# Patient Record
Sex: FEMALE | Race: WHITE | NOT HISPANIC OR LATINO | Employment: OTHER | URBAN - METROPOLITAN AREA
[De-identification: names, ages, dates, MRNs, and addresses within clinical notes are randomized per-mention and may not be internally consistent; named-entity substitution may affect disease eponyms.]

---

## 2017-01-03 RX ORDER — ALLOPURINOL 100 MG/1
200 TABLET ORAL DAILY
Qty: 60 TABLET | Refills: 6 | Status: SHIPPED | OUTPATIENT
Start: 2017-01-03 | End: 2017-02-17 | Stop reason: SDUPTHER

## 2017-01-11 ENCOUNTER — OFFICE VISIT (OUTPATIENT)
Dept: UROLOGY | Facility: CLINIC | Age: 82
End: 2017-01-11
Payer: MEDICARE

## 2017-01-11 VITALS — BODY MASS INDEX: 14.59 KG/M2 | WEIGHT: 85 LBS

## 2017-01-11 DIAGNOSIS — R33.9 URINARY RETENTION: Primary | ICD-10-CM

## 2017-01-11 LAB
BILIRUB SERPL-MCNC: NORMAL MG/DL
BLOOD URINE, POC: NORMAL
COLOR, POC UA: NORMAL
GLUCOSE UR QL STRIP: NORMAL
KETONES UR QL STRIP: NORMAL
LEUKOCYTE ESTERASE URINE, POC: NORMAL
NITRITE, POC UA: NORMAL
PH, POC UA: 7
POC RESIDUAL URINE VOLUME: 41 ML (ref 0–100)
PROTEIN, POC: NORMAL
SPECIFIC GRAVITY, POC UA: 1.01
UROBILINOGEN, POC UA: NORMAL

## 2017-01-11 PROCEDURE — 1159F MED LIST DOCD IN RCRD: CPT | Mod: S$GLB,,, | Performed by: UROLOGY

## 2017-01-11 PROCEDURE — 81002 URINALYSIS NONAUTO W/O SCOPE: CPT | Mod: S$GLB,,, | Performed by: UROLOGY

## 2017-01-11 PROCEDURE — 1126F AMNT PAIN NOTED NONE PRSNT: CPT | Mod: S$GLB,,, | Performed by: UROLOGY

## 2017-01-11 PROCEDURE — 1157F ADVNC CARE PLAN IN RCRD: CPT | Mod: S$GLB,,, | Performed by: UROLOGY

## 2017-01-11 PROCEDURE — 51798 US URINE CAPACITY MEASURE: CPT | Mod: S$GLB,,, | Performed by: UROLOGY

## 2017-01-11 PROCEDURE — 99999 PR PBB SHADOW E&M-EST. PATIENT-LVL I: CPT | Mod: PBBFAC,,, | Performed by: UROLOGY

## 2017-01-11 PROCEDURE — 99499 UNLISTED E&M SERVICE: CPT | Mod: S$GLB,,, | Performed by: UROLOGY

## 2017-01-11 PROCEDURE — 99214 OFFICE O/P EST MOD 30 MIN: CPT | Mod: 25,S$GLB,, | Performed by: UROLOGY

## 2017-01-11 PROCEDURE — 1160F RVW MEDS BY RX/DR IN RCRD: CPT | Mod: S$GLB,,, | Performed by: UROLOGY

## 2017-01-11 NOTE — MR AVS SNAPSHOT
[facility]  [address] [facility] [address]  Phone: [phone]  Fax: [phone]                  Anju Rivera   2017 11:00 AM   Office Visit    Description:  Female : 1929   Provider:  Arley El IV, MD   Department:  [facility]           Diagnoses this Visit        Comments    Urinary retention    -  Primary            To Do List           Future Appointments        Provider Department Dept Phone    3/16/2017 1:30 PM Damian Verduzco MD [facility] - Ophthalmology [phone]    2017 11:20 AM Elizabeth Lejeune, NP [facility] - Pulmonary Services [phone]      Goals (5 Years of Data)     None      Follow-Up and Disposition     Return if symptoms worsen or fail to improve.    Follow-up and Disposition History      [facility]     [facility] -  Assistance  Registered nurses in the [facility] provide clinical advisement, health education, appointment booking, and other advisory services.  Call for this free service at [phone].             Medications           Message regarding Medications     Verify the changes and/or additions to your medication regime listed below are the same as discussed with your clinician today.  If any of these changes or additions are incorrect, please notify your healthcare provider.             Verify that the below list of medications is an accurate representation of the medications you are currently taking.  If none reported, the list may be blank. If incorrect, please contact your healthcare provider. Carry this list with you in case of emergency.           Current Medications     albuterol 90 mcg/actuation inhaler Inhale 2 puffs into the lungs every 4 (four) hours as needed for Wheezing.    albuterol-ipratropium 2.5mg-0.5mg/3mL (DUO-NEB) 0.5 mg-3 mg(2.5 mg base)/3 mL nebulizer solution Take 3 mLs by nebulization every 4 (four) hours as needed for Wheezing. Every 6-8  hours    allopurinol (ZYLOPRIM) 100 MG tablet Take 2 tablets (200 mg total) by mouth once daily.    budesonide-formoterol 80-4.5 mcg (SYMBICORT) 80-4.5 mcg/actuation HFAA INHALE 2 PUFFS BY MOUTH 2 TIMES DAILY (RINSE MOUTH AFTER USE)    citalopram (CELEXA) 40 MG tablet TAKE ONE TABLET BY MOUTH ONCE DAILY    clonazePAM (KLONOPIN) 1 MG tablet TAKE 1 AND 1/2 TABLET BY MOUTH EVERY DAY    cloNIDine (CATAPRES) 0.1 MG tablet TAKE ONE TABLET BY MOUTH ONCE DAILY    hydrochlorothiazide (MICROZIDE) 12.5 mg capsule TAKE TWO CAPSULES (25 MG TOTAL) BY MOUTH ONCE DAILY.    hydrocodone-acetaminophen 5-325mg (NORCO) 5-325 mg per tablet Take 1 1/2 tablet at night    latanoprost 0.005 % ophthalmic solution INSTILL 1 DROP IN EACH EYE EVERY EVENING    levothyroxine (SYNTHROID) 50 MCG tablet Take 1 tablet (50 mcg total) by mouth once daily.    losartan (COZAAR) 100 MG tablet Take 1 tablet (100 mg total) by mouth once daily.    MULTIVITAMIN Take 1 tablet by mouth Daily.           Clinical Reference Information           Vital Signs - Last Recorded  Most recent update: 1/11/2017 11:29 AM by Summer Coker LPN    Wt BMI             38.6 kg (85 lb) 14.59 kg/m2         Allergies as of 1/11/2017     Atorvastatin    Calcium Channel Blocking Agent Diltiazem Analogues      Immunizations Administered on Date of Encounter - 1/11/2017     None      Orders Placed During Today's Visit      Normal Orders This Visit    POCT Bladder Scan     POCT urine dipstick without microscope

## 2017-01-11 NOTE — PROGRESS NOTES
Chief Complaint: Urinary retention    HPI:   1/11/17: Voiding fine feeling well.  No complaints at all.  PVR 41 ml.  10/11/16: Doing well now passed voiding trial PVR 0 today after having miranda out yesterday.  10/7/16: 86 yo woman with intermittent urinary retention was recently hospitalized for a broken left femur and in that process a miranda was placed.  She has had periods of retention in the past with miranda catheters from time to time.  Can walk with assistance but not as good as before the break.  No problems from the catheter but doesn't like it.  It has been in a week.  Getting around a lot better after this week in rehab.  A lot of urine was produced when the miranda was placed.  In the past voided okay when not taking narcotics.    Allergies:  Atorvastatin and Calcium channel blocking agent diltiazem analogues    Medications: has a current medication list which includes the following prescription(s): albuterol, albuterol-ipratropium 2.5mg-0.5mg/3ml, allopurinol, budesonide-formoterol 80-4.5 mcg, citalopram, clonazepam, clonidine, hydrochlorothiazide, hydrocodone-acetaminophen 5-325mg, latanoprost, levothyroxine, losartan, and multivitamin.    Review of Systems:  General: No fever, chills, fatigability, or weight loss.  Skin: No rashes, itching, or changes in color or texture of skin.  Chest: Denies CHRISTINE, cyanosis, wheezing, cough, and sputum production.  Abdomen: Appetite fine. No weight loss. Denies diarrhea, abdominal pain, hematemesis, or blood in stool.  Musculoskeletal: No joint stiffness or swelling. Denies back pain.  : As above.  All other review of systems negative.    PMH:   has a past medical history of Anxiety; Aortic valve disorder (6/17/2013); Arthritis; Breast cancer (1981); Cataract; Chronic bronchitis; Chronic diastolic heart failure (6/17/2013); COPD (chronic obstructive pulmonary disease) (6/17/2013); Coronary artery disease (6/17/2013); Decubitus skin ulcer (11/11/2014); Depression;  Disorder of kidney and ureter; Emphysema of lung; Fall; Glaucoma; Hyperlipidemia; Hypertension (6/17/2013); Hyponatremia (6/3/2014); Hypothyroidism; Kidney cysts; MI (myocardial infarction) (05/1981); Mitral regurgitation (6/17/2013); Nail abnormality (7/16/2014); Osteoporosis; Peripheral vascular disease; Pneumonia; Pulmonary hypertension (6/17/2013); Sinus bradycardia (6/18/2013); Skin cancer; and Thyroid disease.    PSH:   has a past surgical history that includes masectomy (Right, 1996); Finger amputation (1980s); Toe amputation (Left, 1980s); Wrist Arthroplasty (Left, 2000s); ORIF hip fracture (Left, 11/2012); Eye surgery; Cataract extraction; Cardiac catheterization; and Femur fracture surgery.    FamHx: family history includes Arthritis in her father; Cancer in her sister and sister; Glaucoma in her daughter; Heart attack in her mother; Hypertension in her mother; Stroke in her mother. There is no history of Strabismus, Retinal detachment, Macular degeneration, Blindness, or Amblyopia.    SocHx:  reports that she quit smoking about 15 years ago. She has a 20.00 pack-year smoking history. She has never used smokeless tobacco. She reports that she does not drink alcohol or use illicit drugs.     Physical Exam:  Vitals:   There were no vitals filed for this visit.  General: A&Ox3. No apparent distress. No deformities.  Neck: No masses. Normal thyroid.  Lungs: normal inspiration. No use of accessory muscles.  Heart: normal pulse. No arrhythmias.  Abdomen: Soft. NT. ND  Skin: The skin is warm and dry. No jaundice.  Ext: No c/c/e.  :   10/7/16: External genitalia normal.     Labs/Studies:   Bladder Scan performed in office: PVR 41 ml.    Impression/Plan:   1. Doing fine.  Seems to have retention when taking pain meds - avoid as much as possible.  2. RTC prn

## 2017-01-20 ENCOUNTER — PATIENT MESSAGE (OUTPATIENT)
Dept: INTERNAL MEDICINE | Facility: CLINIC | Age: 82
End: 2017-01-20

## 2017-01-21 RX ORDER — CLONAZEPAM 1 MG/1
1.5 TABLET ORAL DAILY
Qty: 45 TABLET | Refills: 1 | Status: SHIPPED | OUTPATIENT
Start: 2017-01-21 | End: 2017-03-14 | Stop reason: SDUPTHER

## 2017-01-23 ENCOUNTER — PATIENT MESSAGE (OUTPATIENT)
Dept: INTERNAL MEDICINE | Facility: CLINIC | Age: 82
End: 2017-01-23

## 2017-01-23 DIAGNOSIS — R52 PAIN: ICD-10-CM

## 2017-01-23 RX ORDER — HYDROCODONE BITARTRATE AND ACETAMINOPHEN 5; 325 MG/1; MG/1
TABLET ORAL
Qty: 45 TABLET | Refills: 0 | Status: SHIPPED | OUTPATIENT
Start: 2017-01-23 | End: 2017-02-20 | Stop reason: SDUPTHER

## 2017-01-24 ENCOUNTER — TELEPHONE (OUTPATIENT)
Dept: INTERNAL MEDICINE | Facility: CLINIC | Age: 82
End: 2017-01-24

## 2017-01-24 ENCOUNTER — PATIENT MESSAGE (OUTPATIENT)
Dept: INTERNAL MEDICINE | Facility: CLINIC | Age: 82
End: 2017-01-24

## 2017-01-31 ENCOUNTER — TELEPHONE (OUTPATIENT)
Dept: INTERNAL MEDICINE | Facility: CLINIC | Age: 82
End: 2017-01-31

## 2017-01-31 NOTE — TELEPHONE ENCOUNTER
Spoke to Kori at [facility].  She states that patient's index finger is infected and oozing pus.  Informed her that at this point patient must be evaluated since it has progressed to this level.  Please advise

## 2017-01-31 NOTE — TELEPHONE ENCOUNTER
----- Message from Svetlana Harris sent at 1/31/2017 12:04 PM CST -----  Contact: Kori Javier calling in regards to pt had infection right index finger cuticle.....[phone]

## 2017-02-15 ENCOUNTER — TELEPHONE (OUTPATIENT)
Dept: CARDIOLOGY | Facility: CLINIC | Age: 82
End: 2017-02-15

## 2017-02-17 RX ORDER — ALLOPURINOL 100 MG/1
200 TABLET ORAL DAILY
Qty: 60 TABLET | Refills: 6 | Status: SHIPPED | OUTPATIENT
Start: 2017-02-17 | End: 2017-09-28 | Stop reason: SDUPTHER

## 2017-02-20 ENCOUNTER — PATIENT MESSAGE (OUTPATIENT)
Dept: INTERNAL MEDICINE | Facility: CLINIC | Age: 82
End: 2017-02-20

## 2017-02-20 DIAGNOSIS — R52 PAIN: ICD-10-CM

## 2017-02-20 RX ORDER — HYDROCODONE BITARTRATE AND ACETAMINOPHEN 5; 325 MG/1; MG/1
TABLET ORAL
Qty: 45 TABLET | Refills: 0 | Status: SHIPPED | OUTPATIENT
Start: 2017-02-20 | End: 2017-03-14 | Stop reason: SDUPTHER

## 2017-02-27 RX ORDER — CLONIDINE HYDROCHLORIDE 0.1 MG/1
0.1 TABLET ORAL DAILY
Qty: 30 TABLET | Refills: 3 | Status: SHIPPED | OUTPATIENT
Start: 2017-02-27 | End: 2017-06-29 | Stop reason: SDUPTHER

## 2017-03-01 ENCOUNTER — PATIENT OUTREACH (OUTPATIENT)
Dept: ADMINISTRATIVE | Facility: HOSPITAL | Age: 82
End: 2017-03-01
Payer: MEDICARE

## 2017-03-01 DIAGNOSIS — M89.9 BONE DISORDER: Primary | ICD-10-CM

## 2017-03-14 ENCOUNTER — LAB VISIT (OUTPATIENT)
Dept: LAB | Facility: HOSPITAL | Age: 82
End: 2017-03-14
Attending: FAMILY MEDICINE
Payer: MEDICARE

## 2017-03-14 ENCOUNTER — OFFICE VISIT (OUTPATIENT)
Dept: INTERNAL MEDICINE | Facility: CLINIC | Age: 82
End: 2017-03-14
Payer: MEDICARE

## 2017-03-14 VITALS
TEMPERATURE: 97 F | BODY MASS INDEX: 14.9 KG/M2 | OXYGEN SATURATION: 93 % | WEIGHT: 87.31 LBS | DIASTOLIC BLOOD PRESSURE: 60 MMHG | SYSTOLIC BLOOD PRESSURE: 110 MMHG | HEIGHT: 64 IN | HEART RATE: 64 BPM

## 2017-03-14 DIAGNOSIS — I77.9 BILATERAL CAROTID ARTERY DISEASE: ICD-10-CM

## 2017-03-14 DIAGNOSIS — S98.112A AMPUTATED GREAT TOE, LEFT: ICD-10-CM

## 2017-03-14 DIAGNOSIS — I50.32 CHRONIC DIASTOLIC HEART FAILURE: Chronic | ICD-10-CM

## 2017-03-14 DIAGNOSIS — E03.4 HYPOTHYROIDISM DUE TO ACQUIRED ATROPHY OF THYROID: ICD-10-CM

## 2017-03-14 DIAGNOSIS — R52 PAIN: ICD-10-CM

## 2017-03-14 DIAGNOSIS — I10 ESSENTIAL HYPERTENSION: Chronic | ICD-10-CM

## 2017-03-14 DIAGNOSIS — J44.9 CHRONIC OBSTRUCTIVE PULMONARY DISEASE, UNSPECIFIED COPD TYPE: Chronic | ICD-10-CM

## 2017-03-14 DIAGNOSIS — Z28.9 DELAYED IMMUNIZATIONS: ICD-10-CM

## 2017-03-14 DIAGNOSIS — F32.5 MAJOR DEPRESSIVE DISORDER WITH SINGLE EPISODE, IN REMISSION: ICD-10-CM

## 2017-03-14 DIAGNOSIS — I10 ESSENTIAL HYPERTENSION: Primary | Chronic | ICD-10-CM

## 2017-03-14 LAB
ALBUMIN SERPL BCP-MCNC: 3.7 G/DL
ALP SERPL-CCNC: 54 U/L
ALT SERPL W/O P-5'-P-CCNC: 12 U/L
ANION GAP SERPL CALC-SCNC: 8 MMOL/L
AST SERPL-CCNC: 22 U/L
BILIRUB SERPL-MCNC: 0.4 MG/DL
BUN SERPL-MCNC: 46 MG/DL
CALCIUM SERPL-MCNC: 9.5 MG/DL
CHLORIDE SERPL-SCNC: 107 MMOL/L
CO2 SERPL-SCNC: 24 MMOL/L
CREAT SERPL-MCNC: 1.6 MG/DL
EST. GFR  (AFRICAN AMERICAN): 33.2 ML/MIN/1.73 M^2
EST. GFR  (NON AFRICAN AMERICAN): 28.8 ML/MIN/1.73 M^2
GLUCOSE SERPL-MCNC: 110 MG/DL
POTASSIUM SERPL-SCNC: 5 MMOL/L
PROT SERPL-MCNC: 6.2 G/DL
SODIUM SERPL-SCNC: 139 MMOL/L
T4 FREE SERPL-MCNC: 1.1 NG/DL
TSH SERPL DL<=0.005 MIU/L-ACNC: 1.26 UIU/ML

## 2017-03-14 PROCEDURE — 1157F ADVNC CARE PLAN IN RCRD: CPT | Mod: S$GLB,,, | Performed by: FAMILY MEDICINE

## 2017-03-14 PROCEDURE — 99499 UNLISTED E&M SERVICE: CPT | Mod: S$GLB,,, | Performed by: FAMILY MEDICINE

## 2017-03-14 PROCEDURE — 99999 PR PBB SHADOW E&M-EST. PATIENT-LVL III: CPT | Mod: PBBFAC,,, | Performed by: FAMILY MEDICINE

## 2017-03-14 PROCEDURE — 1126F AMNT PAIN NOTED NONE PRSNT: CPT | Mod: S$GLB,,, | Performed by: FAMILY MEDICINE

## 2017-03-14 PROCEDURE — 36415 COLL VENOUS BLD VENIPUNCTURE: CPT | Mod: PO

## 2017-03-14 PROCEDURE — 84443 ASSAY THYROID STIM HORMONE: CPT

## 2017-03-14 PROCEDURE — 1159F MED LIST DOCD IN RCRD: CPT | Mod: S$GLB,,, | Performed by: FAMILY MEDICINE

## 2017-03-14 PROCEDURE — 90670 PCV13 VACCINE IM: CPT | Mod: S$GLB,,, | Performed by: FAMILY MEDICINE

## 2017-03-14 PROCEDURE — 1160F RVW MEDS BY RX/DR IN RCRD: CPT | Mod: S$GLB,,, | Performed by: FAMILY MEDICINE

## 2017-03-14 PROCEDURE — 99214 OFFICE O/P EST MOD 30 MIN: CPT | Mod: S$GLB,,, | Performed by: FAMILY MEDICINE

## 2017-03-14 PROCEDURE — 80053 COMPREHEN METABOLIC PANEL: CPT

## 2017-03-14 PROCEDURE — 84439 ASSAY OF FREE THYROXINE: CPT

## 2017-03-14 PROCEDURE — G0009 ADMIN PNEUMOCOCCAL VACCINE: HCPCS | Mod: S$GLB,,, | Performed by: FAMILY MEDICINE

## 2017-03-14 RX ORDER — LOSARTAN POTASSIUM 100 MG/1
TABLET ORAL
Qty: 90 TABLET | Refills: 3 | Status: SHIPPED | OUTPATIENT
Start: 2017-03-14 | End: 2017-04-07

## 2017-03-14 RX ORDER — ASPIRIN 81 MG/1
81 TABLET ORAL DAILY
COMMUNITY

## 2017-03-14 RX ORDER — HYDROCHLOROTHIAZIDE 12.5 MG/1
CAPSULE ORAL
Qty: 60 CAPSULE | Refills: 11 | Status: SHIPPED | OUTPATIENT
Start: 2017-03-14 | End: 2017-04-07

## 2017-03-14 RX ORDER — HYDROCODONE BITARTRATE AND ACETAMINOPHEN 5; 325 MG/1; MG/1
TABLET ORAL
Qty: 75 TABLET | Refills: 0 | Status: SHIPPED | OUTPATIENT
Start: 2017-03-14 | End: 2017-05-22 | Stop reason: SDUPTHER

## 2017-03-14 RX ORDER — HYDROCODONE BITARTRATE AND ACETAMINOPHEN 5; 325 MG/1; MG/1
TABLET ORAL
Qty: 45 TABLET | Refills: 0 | Status: SHIPPED | OUTPATIENT
Start: 2017-03-14 | End: 2017-03-14 | Stop reason: SDUPTHER

## 2017-03-14 RX ORDER — CLONAZEPAM 1 MG/1
1.5 TABLET ORAL DAILY
Qty: 45 TABLET | Refills: 1 | Status: SHIPPED | OUTPATIENT
Start: 2017-03-14 | End: 2017-05-22 | Stop reason: SDUPTHER

## 2017-03-14 NOTE — MR AVS SNAPSHOT
[facility] - Internal Medicine  [address] [facility] [place] [address]  Phone: [phone]  Fax: [phone]                  Anju Rivera   3/14/2017 11:00 AM   Office Visit    Description:  Female : 1929   Provider:  Jose Ramon Delgado MD   [facility]  [facility] - [facility]           Reason for Visit     Follow-up           Diagnoses this Visit        Comments    Essential hypertension    -  Primary Pt BP is well controlled today    Hypothyroidism due to acquired atrophy of thyroid     Will do TSH and Free T4    Chronic diastolic heart failure     Pt is stable at this point    Chronic obstructive pulmonary disease, unspecified COPD type     Pt is stable onthe symbicort    Major depressive disorder with single episode, in remission     Will continue on the celexa    Bilateral carotid artery disease     BP is stable and monitoring cholesterol yearly    Amputated great toe, left     Stable    Pain     Will continue with the norco.    Delayed immunizations     Will do Pneumo 13           To Do List           Future Appointments        Provider Department Dept Phone    3/16/2017 1:30 PM Damian Verduzco MD [facility] - Ophthalmology [phone]    2017 11:20 AM Elizabeth Lejeune, NP [facility] - Pulmonary Services [phone]      Goals (5 Years of Data)     None      Follow-Up and Disposition     Return in about 6 months (around 2017).       These Medications        Disp Refills Start End    losartan (COZAAR) 100 MG tablet 90 tablet 3 3/14/2017     1/2 tab a day    Ph[facility] [address] [place] [place] [address] [place] [facility] [address] [facility] Ph #: [phone]       hydrochlorothiazide (MICROZIDE) 12.5 mg capsule 60 capsule 11 3/14/2017     TAKE1 capsule BY MOUTH ONCE DAILY.    Pharmacy: [facility] [address] [place] [address] [place] - [address] [facility] AT [facility] Ph #: [phone]       Notes to Pharmacy: Maximum Refills Reached     clonazePAM (KLONOPIN) 1 MG tablet 45 tablet 1 3/14/2017     Take 1.5 tablets (1.5 mg total) by mouth once daily. - Oral    Pharmacy: [facility] [id number] - [place] - [address] [facility] AT [facility] Ph #: [phone]       hydrocodone-acetaminophen 5-325mg (NORCO) 5-325 mg per tablet 75 tablet 0 3/14/2017     Take 1/2 to 1 tablet one time during the the day as needed and 1 1/2 tablet at night    Pharmacy: [facility] [id number] [place], [place] [address] [facility] [place] [address] [facility] AT [facility] Ph #: [phone]         [facility]     [facility] On Call Nurse Care Line - 24/7 Assistance  Registered nurses in the [facility] On Call Center provide clinical advisement, health education, appointment booking, and other advisory services.  Call for this free service at [phone].             Medications           Message regarding Medications     Verify the changes and/or additions to your medication regime listed below are the same as discussed with your clinician today.  If any of these changes or additions are incorrect, please notify your healthcare provider.        CHANGE how you are taking these medications     Start Taking Instead of    losartan (COZAAR) 100 MG tablet losartan (COZAAR) 100 MG tablet    Dosage:  1/2 tab a day Dosage:  Take 1 tablet (100 mg total) by mouth once daily.    Reason for Change:  Reorder     hydrochlorothiazide (MICROZIDE) 12.5 mg capsule hydrochlorothiazide (MICROZIDE) 12.5 mg capsule    Dosage:  TAKE1 capsule BY MOUTH ONCE DAILY. Dosage:  TAKE TWO CAPSULES (25 MG TOTAL) BY MOUTH ONCE DAILY.    Reason for Change:  Reorder     hydrocodone-acetaminophen 5-325mg (NORCO) 5-325 mg per tablet hydrocodone-acetaminophen 5-325mg (NORCO) 5-325 mg per tablet    Dosage:  Take 1/2 to 1 tablet one time during the the day as needed and 1 1/2 tablet at night Dosage:  Take 1 1/2 tablet at night    Reason for Change:  Reorder            Verify that the  "below list of medications is an accurate representation of the medications you are currently taking.  If none reported, the list may be blank. If incorrect, please contact your healthcare provider. Carry this list with you in case of emergency.           Current Medications     albuterol 90 mcg/actuation inhaler Inhale 2 puffs into the lungs every 4 (four) hours as needed for Wheezing.    albuterol-ipratropium 2.5mg-0.5mg/3mL (DUO-NEB) 0.5 mg-3 mg(2.5 mg base)/3 mL nebulizer solution Take 3 mLs by nebulization every 4 (four) hours as needed for Wheezing. Every 6-8 hours    allopurinol (ZYLOPRIM) 100 MG tablet Take 2 tablets (200 mg total) by mouth once daily.    aspirin (ECOTRIN) 81 MG EC tablet Take 81 mg by mouth once daily.    budesonide-formoterol 80-4.5 mcg (SYMBICORT) 80-4.5 mcg/actuation HFAA INHALE 2 PUFFS BY MOUTH 2 TIMES DAILY (RINSE MOUTH AFTER USE)    citalopram (CELEXA) 40 MG tablet TAKE ONE TABLET BY MOUTH ONCE DAILY    clonazePAM (KLONOPIN) 1 MG tablet Take 1.5 tablets (1.5 mg total) by mouth once daily.    cloNIDine (CATAPRES) 0.1 MG tablet Take 1 tablet (0.1 mg total) by mouth once daily.    hydrochlorothiazide (MICROZIDE) 12.5 mg capsule TAKE1 capsule BY MOUTH ONCE DAILY.    hydrocodone-acetaminophen 5-325mg (NORCO) 5-325 mg per tablet Take 1/2 to 1 tablet one time during the the day as needed and 1 1/2 tablet at night    latanoprost 0.005 % ophthalmic solution INSTILL 1 DROP IN EACH EYE EVERY EVENING    levothyroxine (SYNTHROID) 50 MCG tablet Take 1 tablet (50 mcg total) by mouth once daily.    losartan (COZAAR) 100 MG tablet 1/2 tab a day    MULTIVITAMIN Take 1 tablet by mouth Daily.           Clinical Reference Information           Your Vitals Were     BP Pulse Temp Height Weight SpO2    110/60 (BP Location: Left arm, Patient Position: Sitting, BP Method: Manual) 64 96.8 °F (36 °C) (Tympanic) 5' 4" (1.626 m) 39.6 kg (87 lb 4.8 oz) 93%    BMI                14.99 kg/m2          Blood Pressure  "         Most Recent Value    BP  110/60      Allergies as of 3/14/2017     Atorvastatin    Calcium Channel Blocking Agent Diltiazem Analogues      Immunizations Administered on Date of Encounter - 3/14/2017     Name Date Dose VIS Date Route    Pneumococcal Conjugate - 13 Valent  Incomplete 0.5 mL 11/5/2015 Intramuscular      Orders Placed During Today's Visit      Normal Orders This Visit    Pneumococcal Conjugate Vaccine (13 Valent) (IM)     Future Labs/Procedures Expected by Expires    Comprehensive metabolic panel  3/14/2017 5/13/2018    T4, free  3/14/2017 5/13/2018    TSH  3/14/2017 5/13/2018      Language Assistance Services     ATTENTION: Language assistance services are available, free of charge. Please call [phone].      ATENCIÓN: Si habla maggie, tiene a caldwell disposición servicios gratuitos de asistencia lingüística. Llame al [phone].     CHÚ Ý: N?u b?n nói Ti?ng Vi?t, có các d?ch v? h? tr? ngôn ng? mi?n phí dành cho b?n. G?i s? [phone].         [facility] - Internal Medicine complies with applicable Federal civil rights laws and does not discriminate on the basis of race, color, national origin, age, disability, or sex.

## 2017-03-16 ENCOUNTER — OFFICE VISIT (OUTPATIENT)
Dept: OPHTHALMOLOGY | Facility: CLINIC | Age: 82
End: 2017-03-16
Payer: MEDICARE

## 2017-03-16 DIAGNOSIS — H40.1133 PRIMARY OPEN ANGLE GLAUCOMA OF BOTH EYES, SEVERE STAGE: Primary | ICD-10-CM

## 2017-03-16 DIAGNOSIS — Z96.1 PSEUDOPHAKIA OF BOTH EYES: ICD-10-CM

## 2017-03-16 DIAGNOSIS — H04.129 DRY EYE: ICD-10-CM

## 2017-03-16 DIAGNOSIS — H01.001 BLEPHARITIS OF UPPER EYELIDS OF BOTH EYES, UNSPECIFIED TYPE: ICD-10-CM

## 2017-03-16 DIAGNOSIS — H01.004 BLEPHARITIS OF UPPER EYELIDS OF BOTH EYES, UNSPECIFIED TYPE: ICD-10-CM

## 2017-03-16 PROCEDURE — 99499 UNLISTED E&M SERVICE: CPT | Mod: S$GLB,,, | Performed by: OPHTHALMOLOGY

## 2017-03-16 PROCEDURE — 92133 CPTRZD OPH DX IMG PST SGM ON: CPT | Mod: S$GLB,,, | Performed by: OPHTHALMOLOGY

## 2017-03-16 PROCEDURE — 92012 INTRM OPH EXAM EST PATIENT: CPT | Mod: S$GLB,,, | Performed by: OPHTHALMOLOGY

## 2017-03-16 PROCEDURE — 99999 PR PBB SHADOW E&M-EST. PATIENT-LVL I: CPT | Mod: PBBFAC,,, | Performed by: OPHTHALMOLOGY

## 2017-03-16 RX ORDER — LATANOPROST 50 UG/ML
SOLUTION/ DROPS OPHTHALMIC
Qty: 2.5 ML | Refills: 12 | Status: SHIPPED | OUTPATIENT
Start: 2017-03-16 | End: 2018-04-05 | Stop reason: SDUPTHER

## 2017-03-16 NOTE — PROGRESS NOTES
SUBJECTIVE:   Anju Rivera is a 87 y.o. female   Corrected distance visual acuity was 20/40 in the right eye and 20/25 in the left eye.   Chief Complaint   Patient presents with    Glaucoma        HPI:  HPI     Patient is here for a 4 month iop check and goct review patient states   she is 100% compliant with drop usage.    1. PCIOL OU  2. Coag with enlarged cups (init 38/18) Goal < 16  HVF/FD 12/5/13  3. PVD OD  4. H/O narrow angles  5. Blepharitis    Latanoprost qhs OU                           Last edited by TOLU Pham on 3/16/2017  1:57 PM.     Assessment /Plan :  1. Primary open angle glaucoma of both eyes, severe stage Doing well, IOP within acceptable range relative to target IOP and no evidence of progression. Continue current treatment. Reviewed importance of continued compliance with treatment and follow up.     2. Dry eye Recommend Systane Ultra  QID OU   3. Pseudophakia of both eyes Stable   4.      Blepharitis Bilateral  Cont WC daily recommend Omega 3 fish Oils 6830-9879 MG p.o. Daily    Return to clinic in 3-4 months  or as needed.  With IOP Check

## 2017-03-18 ENCOUNTER — PATIENT MESSAGE (OUTPATIENT)
Dept: INTERNAL MEDICINE | Facility: CLINIC | Age: 82
End: 2017-03-18

## 2017-03-19 RX ORDER — SULFAMETHOXAZOLE AND TRIMETHOPRIM 800; 160 MG/1; MG/1
1 TABLET ORAL 2 TIMES DAILY
Qty: 20 TABLET | Refills: 0 | Status: SHIPPED | OUTPATIENT
Start: 2017-03-19 | End: 2017-04-07

## 2017-04-03 ENCOUNTER — PATIENT MESSAGE (OUTPATIENT)
Dept: INTERNAL MEDICINE | Facility: CLINIC | Age: 82
End: 2017-04-03

## 2017-04-07 ENCOUNTER — LAB VISIT (OUTPATIENT)
Dept: LAB | Facility: HOSPITAL | Age: 82
End: 2017-04-07
Attending: FAMILY MEDICINE
Payer: MEDICARE

## 2017-04-07 ENCOUNTER — OFFICE VISIT (OUTPATIENT)
Dept: INTERNAL MEDICINE | Facility: CLINIC | Age: 82
End: 2017-04-07
Payer: MEDICARE

## 2017-04-07 VITALS — WEIGHT: 91.5 LBS | BODY MASS INDEX: 15.62 KG/M2 | HEIGHT: 64 IN | HEART RATE: 121 BPM | TEMPERATURE: 96 F

## 2017-04-07 DIAGNOSIS — I50.32 CHRONIC DIASTOLIC HEART FAILURE: Chronic | ICD-10-CM

## 2017-04-07 DIAGNOSIS — S42.302A CLOSED LEFT ARM FRACTURE, INITIAL ENCOUNTER: Primary | ICD-10-CM

## 2017-04-07 DIAGNOSIS — L03.011 PARONYCHIA OF FINGER, RIGHT: ICD-10-CM

## 2017-04-07 LAB
ALBUMIN SERPL BCP-MCNC: 3.1 G/DL
ALP SERPL-CCNC: 60 U/L
ALT SERPL W/O P-5'-P-CCNC: 18 U/L
ANION GAP SERPL CALC-SCNC: 9 MMOL/L
AST SERPL-CCNC: 35 U/L
BILIRUB SERPL-MCNC: 0.6 MG/DL
BUN SERPL-MCNC: 44 MG/DL
CALCIUM SERPL-MCNC: 9.3 MG/DL
CHLORIDE SERPL-SCNC: 110 MMOL/L
CO2 SERPL-SCNC: 22 MMOL/L
CREAT SERPL-MCNC: 1.2 MG/DL
EST. GFR  (AFRICAN AMERICAN): 47 ML/MIN/1.73 M^2
EST. GFR  (NON AFRICAN AMERICAN): 41 ML/MIN/1.73 M^2
GLUCOSE SERPL-MCNC: 71 MG/DL
POTASSIUM SERPL-SCNC: 5.6 MMOL/L
PROT SERPL-MCNC: 6.2 G/DL
SODIUM SERPL-SCNC: 141 MMOL/L

## 2017-04-07 PROCEDURE — 1160F RVW MEDS BY RX/DR IN RCRD: CPT | Mod: S$GLB,,, | Performed by: FAMILY MEDICINE

## 2017-04-07 PROCEDURE — 99213 OFFICE O/P EST LOW 20 MIN: CPT | Mod: S$GLB,,, | Performed by: FAMILY MEDICINE

## 2017-04-07 PROCEDURE — 1159F MED LIST DOCD IN RCRD: CPT | Mod: S$GLB,,, | Performed by: FAMILY MEDICINE

## 2017-04-07 PROCEDURE — 99999 PR PBB SHADOW E&M-EST. PATIENT-LVL II: CPT | Mod: PBBFAC,,, | Performed by: FAMILY MEDICINE

## 2017-04-07 PROCEDURE — 80053 COMPREHEN METABOLIC PANEL: CPT | Mod: PO

## 2017-04-07 PROCEDURE — 1157F ADVNC CARE PLAN IN RCRD: CPT | Mod: S$GLB,,, | Performed by: FAMILY MEDICINE

## 2017-04-07 PROCEDURE — 36415 COLL VENOUS BLD VENIPUNCTURE: CPT | Mod: PO

## 2017-04-07 PROCEDURE — 99499 UNLISTED E&M SERVICE: CPT | Mod: S$GLB,,, | Performed by: FAMILY MEDICINE

## 2017-04-07 RX ORDER — MUPIROCIN 20 MG/G
OINTMENT TOPICAL 3 TIMES DAILY
Qty: 30 G | Refills: 0 | Status: SHIPPED | OUTPATIENT
Start: 2017-04-07 | End: 2018-07-18 | Stop reason: ALTCHOICE

## 2017-04-07 NOTE — MR AVS SNAPSHOT
[facility] Internal Medicine  [address] [facility] [place] [address]  Phone: [phone]  Fax: [phone]                  Anju Rivera   2017 1:40 PM   Office Visit    Description:  Female : 1929   Provider:  Jose Ramon Delgado MD   Department:  [facility] - Internal Medicine           Reason for Visit     Hospital Follow Up           Diagnoses this Visit        Comments    Closed left arm fracture, initial encounter    -  Primary Will continue to follow    Chronic diastolic heart failure     Will continue to see Cardiology    Paronychia of finger, right     Not need to be drained but not healing.            To Do List           Future Appointments        Provider Department Dept Phone    2017 3:00 PM LAB, SAME DAY [facility] [phone]    2017 11:20 AM Elizabeth Lejeune, NP [facility] [phone]    2017 10:45 AM Damian Verduzco MD [facility] - Ophthalmology [phone]      Goals (5 Years of Data)     None       These Medications        Disp Refills Start End    mupirocin (BACTROBAN) 2 % ointment 30 g 0 2017     Apply topically 3 (three) times daily. - Topical (Top)    [facility] [address] [place] [address] [place] - [address] [facility] AT [facility] Ph #: [phone]         [facility]     [facility] Care [facility] -  Assistance  Unless otherwise directed by your provider, please contact [facility], our nurse care line that is available for / assistance.     Registered nurses in the [facility] provide: appointment scheduling, clinical advisement, health education, and other advisory services.  Call: [phone] (toll free)               Medications           Message regarding Medications     Verify the changes and/or additions to your medication regime listed below are the same as discussed with your clinician today.  If any of these changes or additions are  incorrect, please notify your healthcare provider.        START taking these NEW medications        Refills    mupirocin (BACTROBAN) 2 % ointment 0    Sig: Apply topically 3 (three) times daily.    Class: Normal    Route: Topical (Top)      STOP taking these medications     losartan (COZAAR) 100 MG tablet 1/2 tab a day    hydrochlorothiazide (MICROZIDE) 12.5 mg capsule TAKE1 capsule BY MOUTH ONCE DAILY.    sulfamethoxazole-trimethoprim 800-160mg (BACTRIM DS) 800-160 mg Tab Take 1 tablet by mouth 2 (two) times daily.           Verify that the below list of medications is an accurate representation of the medications you are currently taking.  If none reported, the list may be blank. If incorrect, please contact your healthcare provider. Carry this list with you in case of emergency.           Current Medications     albuterol 90 mcg/actuation inhaler Inhale 2 puffs into the lungs every 4 (four) hours as needed for Wheezing.    albuterol-ipratropium 2.5mg-0.5mg/3mL (DUO-NEB) 0.5 mg-3 mg(2.5 mg base)/3 mL nebulizer solution Take 3 mLs by nebulization every 4 (four) hours as needed for Wheezing. Every 6-8 hours    allopurinol (ZYLOPRIM) 100 MG tablet Take 2 tablets (200 mg total) by mouth once daily.    aspirin (ECOTRIN) 81 MG EC tablet Take 81 mg by mouth once daily.    budesonide-formoterol 80-4.5 mcg (SYMBICORT) 80-4.5 mcg/actuation HFAA INHALE 2 PUFFS BY MOUTH 2 TIMES DAILY (RINSE MOUTH AFTER USE)    citalopram (CELEXA) 40 MG tablet TAKE ONE TABLET BY MOUTH ONCE DAILY    clonazePAM (KLONOPIN) 1 MG tablet Take 1.5 tablets (1.5 mg total) by mouth once daily.    cloNIDine (CATAPRES) 0.1 MG tablet Take 1 tablet (0.1 mg total) by mouth once daily.    hydrocodone-acetaminophen 5-325mg (NORCO) 5-325 mg per tablet Take 1/2 to 1 tablet one time during the the day as needed and 1 1/2 tablet at night    latanoprost 0.005 % ophthalmic solution INSTILL 1 DROP IN EACH EYE EVERY EVENING    levothyroxine (SYNTHROID) 50 MCG tablet  "Take 1 tablet (50 mcg total) by mouth once daily.    MULTIVITAMIN Take 1 tablet by mouth Daily.    mupirocin (BACTROBAN) 2 % ointment Apply topically 3 (three) times daily.           Clinical Reference Information           Your Vitals Were     Pulse Temp Height Weight BMI    121 96.1 °F (35.6 °C) (Tympanic) 5' 4" (1.626 m) 41.5 kg (91 lb 7.9 oz) 15.7 kg/m2      Allergies as of 4/7/2017     Atorvastatin    Calcium Channel Blocking Agent Diltiazem Analogues      Immunizations Administered on Date of Encounter - 4/7/2017     None      Orders Placed During Today's Visit     Future Labs/Procedures Expected by Expires    Comprehensive metabolic panel  4/7/2017 6/6/2018      Language Assistance Services     ATTENTION: Language assistance services are available, free of charge. Please call [phone].      ATENCIÓN: Si gigi serrano, tiene a caldwell disposición servicios gratuitos de asistencia lingüística. Llame al [phone].     MICHAEL Ý: N?u b?n nói Ti?ng Vi?t, có các d?ch v? h? tr? ngôn ng? mi?n phí dành cho b?n. G?i s? [phone].         [facility] - Internal Medicine complies with applicable Federal civil rights laws and does not discriminate on the basis of race, color, national origin, age, disability, or sex.        "

## 2017-04-07 NOTE — PROGRESS NOTES
Subjective:       Patient ID: Anju Rivera is a 87 y.o. female.    Chief Complaint: Hospital Follow Up    HPI Comments: Hospital F/U:       Pt is a 87 year old who fractures her left humerus. Pt was placed in sling. Pt was found to have elevated potassium and was also D'C her blood pressures. Pt was on  HCTZ and losartan.     Review of Systems   Constitutional: Negative.    Respiratory: Negative.    Cardiovascular: Negative.    Genitourinary: Negative.    Musculoskeletal: Negative.    Neurological: Negative.    Hematological: Negative.        Objective:      Physical Exam   Constitutional: She appears well-developed and well-nourished.   Cardiovascular: Normal rate and regular rhythm.    Pulmonary/Chest: Effort normal and breath sounds normal.   Abdominal: Soft.   Skin: Skin is warm and dry.   Psychiatric: She has a normal mood and affect.       Assessment:       1. Closed left arm fracture, initial encounter    2. Chronic diastolic heart failure    3. Paronychia of finger, right        Plan:       Closed left arm fracture, initial encounter  Comments:  Will continue to follow    Chronic diastolic heart failure  Comments:  Will continue to see Cardiology    Paronychia of finger, right  Comments:  Not need to be drained but not healing.     Other orders  -     mupirocin (BACTROBAN) 2 % ointment; Apply topically 3 (three) times daily.  Dispense: 30 g; Refill: 0

## 2017-04-10 ENCOUNTER — PATIENT MESSAGE (OUTPATIENT)
Dept: INTERNAL MEDICINE | Facility: CLINIC | Age: 82
End: 2017-04-10

## 2017-04-10 DIAGNOSIS — E87.5 HYPERKALEMIA: Primary | ICD-10-CM

## 2017-04-13 ENCOUNTER — PATIENT MESSAGE (OUTPATIENT)
Dept: INTERNAL MEDICINE | Facility: CLINIC | Age: 82
End: 2017-04-13

## 2017-04-13 ENCOUNTER — LAB VISIT (OUTPATIENT)
Dept: LAB | Facility: HOSPITAL | Age: 82
End: 2017-04-13
Attending: FAMILY MEDICINE
Payer: MEDICARE

## 2017-04-13 DIAGNOSIS — E87.5 HYPERKALEMIA: ICD-10-CM

## 2017-04-13 LAB — POTASSIUM SERPL-SCNC: 5.2 MMOL/L

## 2017-04-13 PROCEDURE — 84132 ASSAY OF SERUM POTASSIUM: CPT

## 2017-04-13 PROCEDURE — 36415 COLL VENOUS BLD VENIPUNCTURE: CPT | Mod: PO

## 2017-05-09 ENCOUNTER — OFFICE VISIT (OUTPATIENT)
Dept: INTERNAL MEDICINE | Facility: CLINIC | Age: 82
End: 2017-05-09
Payer: MEDICARE

## 2017-05-09 VITALS
DIASTOLIC BLOOD PRESSURE: 76 MMHG | WEIGHT: 85.31 LBS | HEIGHT: 64 IN | TEMPERATURE: 98 F | BODY MASS INDEX: 14.57 KG/M2 | SYSTOLIC BLOOD PRESSURE: 122 MMHG

## 2017-05-09 DIAGNOSIS — I27.20 PULMONARY HYPERTENSION: Chronic | ICD-10-CM

## 2017-05-09 DIAGNOSIS — M25.511 RIGHT SHOULDER PAIN, UNSPECIFIED CHRONICITY: Primary | ICD-10-CM

## 2017-05-09 DIAGNOSIS — I73.9 PERIPHERAL VASCULAR DISEASE: ICD-10-CM

## 2017-05-09 DIAGNOSIS — I34.0 NON-RHEUMATIC MITRAL REGURGITATION: Chronic | ICD-10-CM

## 2017-05-09 DIAGNOSIS — R06.02 SHORTNESS OF BREATH: ICD-10-CM

## 2017-05-09 DIAGNOSIS — J44.9 CHRONIC OBSTRUCTIVE PULMONARY DISEASE, UNSPECIFIED COPD TYPE: Chronic | ICD-10-CM

## 2017-05-09 DIAGNOSIS — E78.2 MIXED HYPERLIPIDEMIA: Chronic | ICD-10-CM

## 2017-05-09 DIAGNOSIS — F41.9 ANXIETY: ICD-10-CM

## 2017-05-09 DIAGNOSIS — I25.2 OLD MI (MYOCARDIAL INFARCTION): Chronic | ICD-10-CM

## 2017-05-09 DIAGNOSIS — I10 ESSENTIAL HYPERTENSION: Chronic | ICD-10-CM

## 2017-05-09 DIAGNOSIS — F32.5 MAJOR DEPRESSIVE DISORDER WITH SINGLE EPISODE, IN REMISSION: ICD-10-CM

## 2017-05-09 DIAGNOSIS — I25.83 CORONARY ARTERY DISEASE DUE TO LIPID RICH PLAQUE: Chronic | ICD-10-CM

## 2017-05-09 DIAGNOSIS — I50.32 CHRONIC DIASTOLIC HEART FAILURE: Chronic | ICD-10-CM

## 2017-05-09 DIAGNOSIS — I25.10 CORONARY ARTERY DISEASE DUE TO LIPID RICH PLAQUE: Chronic | ICD-10-CM

## 2017-05-09 DIAGNOSIS — I35.9 AORTIC VALVE DISORDER: Chronic | ICD-10-CM

## 2017-05-09 DIAGNOSIS — H40.1133 PRIMARY OPEN ANGLE GLAUCOMA OF BOTH EYES, SEVERE STAGE: ICD-10-CM

## 2017-05-09 DIAGNOSIS — E03.4 HYPOTHYROIDISM DUE TO ACQUIRED ATROPHY OF THYROID: ICD-10-CM

## 2017-05-09 DIAGNOSIS — Z85.3 HISTORY OF BREAST CANCER: ICD-10-CM

## 2017-05-09 DIAGNOSIS — I77.9 BILATERAL CAROTID ARTERY DISEASE: ICD-10-CM

## 2017-05-09 DIAGNOSIS — N18.30 CHRONIC KIDNEY DISEASE, STAGE III (MODERATE): ICD-10-CM

## 2017-05-09 PROCEDURE — 99499 UNLISTED E&M SERVICE: CPT | Mod: S$GLB,,, | Performed by: PHYSICIAN ASSISTANT

## 2017-05-09 PROCEDURE — 1160F RVW MEDS BY RX/DR IN RCRD: CPT | Mod: S$GLB,,, | Performed by: PHYSICIAN ASSISTANT

## 2017-05-09 PROCEDURE — 99215 OFFICE O/P EST HI 40 MIN: CPT | Mod: S$GLB,,, | Performed by: PHYSICIAN ASSISTANT

## 2017-05-09 PROCEDURE — 1159F MED LIST DOCD IN RCRD: CPT | Mod: S$GLB,,, | Performed by: PHYSICIAN ASSISTANT

## 2017-05-09 PROCEDURE — 1157F ADVNC CARE PLAN IN RCRD: CPT | Mod: 8P,S$GLB,, | Performed by: PHYSICIAN ASSISTANT

## 2017-05-09 PROCEDURE — 99999 PR PBB SHADOW E&M-EST. PATIENT-LVL III: CPT | Mod: PBBFAC,,, | Performed by: PHYSICIAN ASSISTANT

## 2017-05-09 NOTE — MR AVS SNAPSHOT
[facility]  [address] [facility] [address] [place] [place] [address]  Phone: [phone]  Fax: [phone]                  Anju Rivera   2017 10:00 AM   Office Visit    Description:  Female : 1929   Provider:  Arley Hogan PA-C   Department:  [facility]           Reason for Visit     HRA           Diagnoses this Visit        Comments    Right shoulder pain, unspecified chronicity    -  Primary     Major depressive disorder with single episode, in remission         Anxiety         Shortness of breath         Pulmonary hypertension         Chronic obstructive pulmonary disease, unspecified COPD type         Peripheral vascular disease         Non-rheumatic mitral regurgitation         Essential hypertension         Old MI (myocardial infarction)         Coronary artery disease due to lipid rich plaque         Chronic diastolic heart failure         Bilateral carotid artery disease         Aortic valve disorder         Chronic kidney disease, stage III (moderate)         Hypothyroidism due to acquired atrophy of thyroid         Mixed hyperlipidemia         Primary open angle glaucoma of both eyes, severe stage         History of breast cancer                To Do List           Future Appointments        Provider Department Dept Phone    2017 11:20 AM Elizabeth Lejeune, NP [facility] [phone]    2017 10:45 AM Damian Verduzco MD [facility] [phone]      Goals (5 Years of Data)     None      Follow-Up and Disposition     Return in about 3 months (around 2017).      [facility]     [facility] -  Assistance  Unless otherwise directed by your provider, please contact [facility], our nurse care line that is available for  assistance.     Registered nurses in the [facility] provide: appointment scheduling, clinical advisement, health education, and other advisory services.  Call:  [phone] (toll free)               Medications           Message regarding Medications     Verify the changes and/or additions to your medication regime listed below are the same as discussed with your clinician today.  If any of these changes or additions are incorrect, please notify your healthcare provider.             Verify that the below list of medications is an accurate representation of the medications you are currently taking.  If none reported, the list may be blank. If incorrect, please contact your healthcare provider. Carry this list with you in case of emergency.           Current Medications     albuterol 90 mcg/actuation inhaler Inhale 2 puffs into the lungs every 4 (four) hours as needed for Wheezing.    albuterol-ipratropium 2.5mg-0.5mg/3mL (DUO-NEB) 0.5 mg-3 mg(2.5 mg base)/3 mL nebulizer solution Take 3 mLs by nebulization every 4 (four) hours as needed for Wheezing. Every 6-8 hours    allopurinol (ZYLOPRIM) 100 MG tablet Take 2 tablets (200 mg total) by mouth once daily.    aspirin (ECOTRIN) 81 MG EC tablet Take 81 mg by mouth once daily.    budesonide-formoterol 80-4.5 mcg (SYMBICORT) 80-4.5 mcg/actuation HFAA INHALE 2 PUFFS BY MOUTH 2 TIMES DAILY (RINSE MOUTH AFTER USE)    citalopram (CELEXA) 40 MG tablet TAKE ONE TABLET BY MOUTH ONCE DAILY    clonazePAM (KLONOPIN) 1 MG tablet Take 1.5 tablets (1.5 mg total) by mouth once daily.    cloNIDine (CATAPRES) 0.1 MG tablet Take 1 tablet (0.1 mg total) by mouth once daily.    hydrocodone-acetaminophen 5-325mg (NORCO) 5-325 mg per tablet Take 1/2 to 1 tablet one time during the the day as needed and 1 1/2 tablet at night    latanoprost 0.005 % ophthalmic solution INSTILL 1 DROP IN EACH EYE EVERY EVENING    levothyroxine (SYNTHROID) 50 MCG tablet Take 1 tablet (50 mcg total) by mouth once daily.    MULTIVITAMIN Take 1 tablet by mouth Daily.    mupirocin (BACTROBAN) 2 % ointment Apply topically 3 (three) times daily.           Clinical Reference  "Information           Your Vitals Were     BP Temp Height Weight BMI    122/76 (BP Location: Right leg, Patient Position: Sitting, BP Method: Manual) 97.9 °F (36.6 °C) (Tympanic) 5' 4" (1.626 m) 38.7 kg (85 lb 5.1 oz) 14.64 kg/m2      Blood Pressure          Most Recent Value    BP  122/76      Allergies as of 5/9/2017     Atorvastatin    Calcium Channel Blocking Agent Diltiazem Analogues      Immunizations Administered on Date of Encounter - 5/9/2017     None      Language Assistance Services     ATTENTION: Language assistance services are available, free of charge. Please call [phone].      ATENCIÓN: Si habla maggie, tiene a caldwell disposición servicios gratuitos de asistencia lingüística. Llame al [phone].     CHÚ Ý: N?u b?n nói Ti?ng Vi?t, có các d?ch v? h? tr? ngôn ng? mi?n phí dành cho b?n. G?i s? [phone].         [facility] complies with applicable Federal civil rights laws and does not discriminate on the basis of race, color, national origin, age, disability, or sex.        "

## 2017-05-09 NOTE — PROGRESS NOTES
"Anju Rivera presented for a  Medicare AWV and comprehensive Health Risk Assessment today. The following components were reviewed and updated:    · Medical history  · Family History  · Social history  · Allergies and Current Medications  · Health Risk Assessment  · Health Maintenance  · Care Team     She comes in today in a wheelchair with her portable oxygen by nasal cannula for her HRA exam.  She really does not have any acute problems at this point.     See Completed Assessments for Annual Wellness Visit within the encounter summary.       The following assessments were completed:  · Living Situation  · CAGE  · Depression Screening  · Timed Get Up and Go  · Whisper Test  · Cognitive Function Screening  · Nutrition Screening  · ADL Screening  · PAQ Screening    Vitals:    05/09/17 1006   BP: 122/76   BP Location: Right leg   Patient Position: Sitting   BP Method: Manual   Temp: 97.9 °F (36.6 °C)   TempSrc: Tympanic   Weight: 38.7 kg (85 lb 5.1 oz)   Height: 5' 4" (1.626 m)     Body mass index is 14.64 kg/(m^2).  Physical Exam   Constitutional: She is oriented to person, place, and time. She appears well-developed and well-nourished.   HENT:   Head: Normocephalic and atraumatic.   Right Ear: External ear normal.   Left Ear: External ear normal.   Nose: Nose normal.   Mouth/Throat: Oropharynx is clear and moist.   She does not use any hearing aids.   Eyes: Conjunctivae and EOM are normal. Pupils are equal, round, and reactive to light. Right eye exhibits no discharge. Left eye exhibits no discharge. No scleral icterus.   She wears glasses.   Neck: Neck supple. No JVD present. No tracheal deviation present. No thyromegaly present.   Cardiovascular: Normal rate, regular rhythm and normal heart sounds.  Exam reveals no gallop and no friction rub.    No murmur heard.  Pulmonary/Chest: Effort normal and breath sounds normal. No stridor. No respiratory distress. She has no wheezes. She has no rales.   Abdominal: Soft. " Bowel sounds are normal. She exhibits no distension and no mass. There is no tenderness. There is no rebound and no guarding.   Genitourinary:   Genitourinary Comments: This portion of the examination was not done today.   Musculoskeletal: Normal range of motion. She exhibits no edema, tenderness or deformity.   She is wearing a sling protecting her chronically fractured left forearm.  She is also in a wheelchair because it is difficult for her to bear weight on her left femur.   Lymphadenopathy:     She has no cervical adenopathy.   Neurological: She is alert and oriented to person, place, and time. She displays normal reflexes. No cranial nerve deficit.   Skin: Skin is warm and dry.   Psychiatric: She has a normal mood and affect. Her behavior is normal. Judgment and thought content normal.   She is quite acute and alert for a person her age.   Nursing note and vitals reviewed.        Diagnoses and health risks identified today and associated recommendations/orders:    1. Right shoulder pain, unspecified chronicity  This is adequately followed by her orthopedic surgeon Dr. Bernardo Hatch M.D.    2. Major depressive disorder with single episode, in remission  This is adequately followed by her PCP Dr. Jose Ramon Delgado M.D.    3. Anxiety  See answer to #2    4. Shortness of breath  This is adequately followed by her pulmonologist Dr. Kb Montez M.D.    5. Pulmonary hypertension  See answer to #4.    6. Chronic obstructive pulmonary disease, unspecified COPD type  See answer to #4.    7. Peripheral vascular disease  This is adequately followed by her cardiologist Dr. Ander Trejo M.D.    8. Non-rheumatic mitral regurgitation  See answer to #7    9. Essential hypertension  See answer to #2    10. HX of MI  See answer to #7    11. Coronary artery disease due to lipid rich plaque  See answer to #7    12. Chronic diastolic heart failure  See answer to #7    13. Bilateral carotid artery disease  See answer to #7    14. Aortic  valve disorder  See answer to #7    15. Chronic kidney disease, stage III (moderate)  This is adequately followed by her nephrologist Dr. Lakshmi SAMUELS    16. Hypothyroidism due to acquired atrophy of thyroid  See answer to #2    17. Mixed hyperlipidemia  See answer to #2    18. Primary open angle glaucoma of both eyes, severe stage  This is adequately followed by her ophthalmologist Dr. Damian Verduzco M.D.    19. History of breast cancer  See answer to #2.      Provided Anju with a 5-10 year written screening schedule and personal prevention plan. Recommendations were developed using the USPSTF age appropriate recommendations. Education, counseling, and referrals were provided as needed. After Visit Summary printed and given to patient which includes a list of additional screenings\tests needed.    She is overdue to have a DEXA scan run.  She is also due to have an update on her tetanus shot and also her Zostavax vaccine.    Return in about 3 months (around 8/9/2017).    Arley Hogan PA-C

## 2017-05-22 ENCOUNTER — PATIENT MESSAGE (OUTPATIENT)
Dept: INTERNAL MEDICINE | Facility: CLINIC | Age: 82
End: 2017-05-22

## 2017-05-22 DIAGNOSIS — R52 PAIN: ICD-10-CM

## 2017-05-22 RX ORDER — CLONAZEPAM 1 MG/1
1.5 TABLET ORAL DAILY
Qty: 45 TABLET | Refills: 1 | Status: SHIPPED | OUTPATIENT
Start: 2017-05-22 | End: 2017-05-24 | Stop reason: SDUPTHER

## 2017-05-22 RX ORDER — HYDROCODONE BITARTRATE AND ACETAMINOPHEN 5; 325 MG/1; MG/1
TABLET ORAL
Qty: 75 TABLET | Refills: 0 | Status: SHIPPED | OUTPATIENT
Start: 2017-05-22 | End: 2017-06-26 | Stop reason: SDUPTHER

## 2017-05-24 RX ORDER — CLONAZEPAM 1 MG/1
1.5 TABLET ORAL DAILY
Qty: 45 TABLET | Refills: 1 | Status: SHIPPED | OUTPATIENT
Start: 2017-05-24 | End: 2017-06-26 | Stop reason: SDUPTHER

## 2017-06-06 ENCOUNTER — OFFICE VISIT (OUTPATIENT)
Dept: SLEEP MEDICINE | Facility: CLINIC | Age: 82
End: 2017-06-06
Payer: MEDICARE

## 2017-06-06 VITALS
OXYGEN SATURATION: 90 % | HEART RATE: 56 BPM | SYSTOLIC BLOOD PRESSURE: 126 MMHG | DIASTOLIC BLOOD PRESSURE: 76 MMHG | RESPIRATION RATE: 18 BRPM | WEIGHT: 82.69 LBS | BODY MASS INDEX: 14.12 KG/M2 | HEIGHT: 64 IN

## 2017-06-06 DIAGNOSIS — J44.9 CHRONIC OBSTRUCTIVE PULMONARY DISEASE, UNSPECIFIED COPD TYPE: Primary | ICD-10-CM

## 2017-06-06 DIAGNOSIS — R05.9 COUGH: ICD-10-CM

## 2017-06-06 PROCEDURE — 1159F MED LIST DOCD IN RCRD: CPT | Mod: S$GLB,,, | Performed by: NURSE PRACTITIONER

## 2017-06-06 PROCEDURE — 99214 OFFICE O/P EST MOD 30 MIN: CPT | Mod: S$GLB,,, | Performed by: NURSE PRACTITIONER

## 2017-06-06 PROCEDURE — 99999 PR PBB SHADOW E&M-EST. PATIENT-LVL III: CPT | Mod: PBBFAC,,, | Performed by: NURSE PRACTITIONER

## 2017-06-06 PROCEDURE — 99499 UNLISTED E&M SERVICE: CPT | Mod: S$GLB,,, | Performed by: NURSE PRACTITIONER

## 2017-06-06 RX ORDER — DOXYCYCLINE 100 MG/1
100 CAPSULE ORAL 2 TIMES DAILY
Qty: 20 CAPSULE | Refills: 0 | Status: SHIPPED | OUTPATIENT
Start: 2017-06-06 | End: 2017-07-11 | Stop reason: ALTCHOICE

## 2017-06-06 NOTE — PROGRESS NOTES
"Subjective:      Patient ID: Anju Rivera is a 87 y.o. female.    Chief Complaint: COPD    Patient presents to the office for COPD on oxygen therapy.  Symbicort has helped her symptoms.  She states she has a chronic cough.  She has yellow mucus at this time.  She is having some episodes of reflux, but denies known aspiration.  No fever.  Patient Active Problem List:     Coronary artery disease     Chronic diastolic heart failure     Mitral regurgitation     COPD (chronic obstructive pulmonary disease)     Hypertension     Aortic valve disorder     Pulmonary hypertension     Sinus bradycardia     Mixed hyperlipidemia     COAG (chronic open-angle glaucoma) - Both Eyes     HX of MI     Hypothyroidism     Peripheral vascular disease     Amputated great toe     Anxiety     Shortness of breath     PVC's (premature ventricular contractions)     Proteinuria     Chronic kidney disease, stage III (moderate)     History of breast cancer     History of skin cancer     Major depression     Carotid artery disease     Ectatic aorta     Primary open angle glaucoma of both eyes, severe stage     Pseudophakia of both eyes     Refractive error     Right shoulder pain     Closed left arm fracture     Paronychia of finger of right hand            /76 (BP Location: Right leg, Patient Position: Sitting, BP Method: Manual)   Pulse (!) 56   Resp 18   Ht 5' 4" (1.626 m)   Wt 37.5 kg (82 lb 10.8 oz)   SpO2 (!) 90%   BMI 14.19 kg/m²   Body mass index is 14.19 kg/m².    Review of Systems   Constitutional: Positive for weight loss and fatigue.   HENT: Negative.    Respiratory: Positive for cough.    Musculoskeletal: Positive for arthralgias.   Gastrointestinal: Positive for acid reflux.   Neurological: Positive for weakness.   Psychiatric/Behavioral: Negative.      Objective:      Physical Exam   Pulmonary/Chest: She has rhonchi.     Personal Diagnostic Review      Results for orders placed during the hospital encounter of " 12/06/16   X-Ray Chest PA And Lateral    Narrative Chest two views.  Comparison 10/04/2016.    Findings: There has been interval clearing of prior right basilar consolidation and small bilateral pleural effusions.  COPD changes.  Minor linear atelectasis or scar right base.  Normal heart size.  Degenerative changes in the spine and shoulder girdle.  Old rib fractures.  Surgical clips right axilla.    Impression  As above.      Electronically signed by: LEANDRA MARRERO MD  Date:     12/06/16  Time:    13:17          Assessment:       1. Chronic obstructive pulmonary disease, unspecified COPD type    2. Cough        Outpatient Encounter Prescriptions as of 6/6/2017   Medication Sig Dispense Refill    albuterol 90 mcg/actuation inhaler Inhale 2 puffs into the lungs every 4 (four) hours as needed for Wheezing. 1 Inhaler 2    albuterol-ipratropium 2.5mg-0.5mg/3mL (DUO-NEB) 0.5 mg-3 mg(2.5 mg base)/3 mL nebulizer solution Take 3 mLs by nebulization every 4 (four) hours as needed for Wheezing. Every 6-8 hours 360 mL 6    allopurinol (ZYLOPRIM) 100 MG tablet Take 2 tablets (200 mg total) by mouth once daily. 60 tablet 6    aspirin (ECOTRIN) 81 MG EC tablet Take 81 mg by mouth once daily.      budesonide-formoterol 80-4.5 mcg (SYMBICORT) 80-4.5 mcg/actuation HFAA INHALE 2 PUFFS BY MOUTH 2 TIMES DAILY (RINSE MOUTH AFTER USE) 10.2 g 11    citalopram (CELEXA) 40 MG tablet TAKE ONE TABLET BY MOUTH ONCE DAILY 30 tablet 4    clonazePAM (KLONOPIN) 1 MG tablet Take 1.5 tablets (1.5 mg total) by mouth once daily. 45 tablet 1    cloNIDine (CATAPRES) 0.1 MG tablet Take 1 tablet (0.1 mg total) by mouth once daily. 30 tablet 3    hydrocodone-acetaminophen 5-325mg (NORCO) 5-325 mg per tablet Take 1/2 to 1 tablet one time during the the day as needed and 1 1/2 tablet at night 75 tablet 0    latanoprost 0.005 % ophthalmic solution INSTILL 1 DROP IN EACH EYE EVERY EVENING 2.5 mL 12    levothyroxine (SYNTHROID) 50 MCG tablet Take 1  tablet (50 mcg total) by mouth once daily. 30 tablet 6    MULTIVITAMIN Take 1 tablet by mouth Daily.      mupirocin (BACTROBAN) 2 % ointment Apply topically 3 (three) times daily. 30 g 0    doxycycline (MONODOX) 100 MG capsule Take 1 capsule (100 mg total) by mouth 2 (two) times daily. 20 capsule 0     No facility-administered encounter medications on file as of 6/6/2017.      No orders of the defined types were placed in this encounter.    Plan:      Doxycycline. Breathing techinque for adequate oxygen delivery.  Perform neb tx 2x day.  Continue symbicort.   Ventolin as needed.

## 2017-06-09 ENCOUNTER — PATIENT MESSAGE (OUTPATIENT)
Dept: SLEEP MEDICINE | Facility: CLINIC | Age: 82
End: 2017-06-09

## 2017-06-26 DIAGNOSIS — R52 PAIN: ICD-10-CM

## 2017-06-26 RX ORDER — HYDROCODONE BITARTRATE AND ACETAMINOPHEN 5; 325 MG/1; MG/1
TABLET ORAL
Qty: 75 TABLET | Refills: 0 | Status: SHIPPED | OUTPATIENT
Start: 2017-06-26 | End: 2017-08-15 | Stop reason: SDUPTHER

## 2017-06-26 RX ORDER — CLONAZEPAM 1 MG/1
1.5 TABLET ORAL DAILY
Qty: 45 TABLET | Refills: 0 | Status: SHIPPED | OUTPATIENT
Start: 2017-06-26 | End: 2017-12-20 | Stop reason: SDUPTHER

## 2017-06-30 RX ORDER — CLONIDINE HYDROCHLORIDE 0.1 MG/1
TABLET ORAL
Qty: 90 TABLET | Refills: 2 | Status: SHIPPED | OUTPATIENT
Start: 2017-06-30 | End: 2018-02-16 | Stop reason: SDUPTHER

## 2017-07-11 ENCOUNTER — OFFICE VISIT (OUTPATIENT)
Dept: SLEEP MEDICINE | Facility: CLINIC | Age: 82
End: 2017-07-11
Payer: MEDICARE

## 2017-07-11 VITALS
OXYGEN SATURATION: 92 % | WEIGHT: 82.25 LBS | HEART RATE: 56 BPM | BODY MASS INDEX: 14.04 KG/M2 | RESPIRATION RATE: 18 BRPM | SYSTOLIC BLOOD PRESSURE: 124 MMHG | DIASTOLIC BLOOD PRESSURE: 64 MMHG | HEIGHT: 64 IN

## 2017-07-11 DIAGNOSIS — R05.9 COUGH: ICD-10-CM

## 2017-07-11 DIAGNOSIS — J44.9 CHRONIC OBSTRUCTIVE PULMONARY DISEASE, UNSPECIFIED COPD TYPE: Primary | ICD-10-CM

## 2017-07-11 PROCEDURE — 99999 PR PBB SHADOW E&M-EST. PATIENT-LVL IV: CPT | Mod: PBBFAC,,, | Performed by: NURSE PRACTITIONER

## 2017-07-11 PROCEDURE — 1159F MED LIST DOCD IN RCRD: CPT | Mod: S$GLB,,, | Performed by: NURSE PRACTITIONER

## 2017-07-11 PROCEDURE — 99499 UNLISTED E&M SERVICE: CPT | Mod: S$GLB,,, | Performed by: NURSE PRACTITIONER

## 2017-07-11 PROCEDURE — 99213 OFFICE O/P EST LOW 20 MIN: CPT | Mod: S$GLB,,, | Performed by: NURSE PRACTITIONER

## 2017-07-11 NOTE — PROGRESS NOTES
"Subjective:      Patient ID: Anju Rivera is a 87 y.o. female.    Chief Complaint: COPD    Patient presents to the office for COPD on oxygen therapy.  Symbicort has helped her symptoms.  She has a chronic cough.  Recent exacerbation treated with doxycycline. Improved. She is now doing neb tx. mucinex helped with mucous production.    No fever, chills, or hemoptysis. No pleuritic type chest pain. Breathing is stable as compared to 6 months ago.               Coronary artery disease     Chronic diastolic heart failure     Mitral regurgitation     COPD (chronic obstructive pulmonary disease)     Hypertension     Aortic valve disorder     Pulmonary hypertension     Sinus bradycardia     Mixed hyperlipidemia     COAG (chronic open-angle glaucoma) - Both Eyes     HX of MI     Hypothyroidism     Peripheral vascular disease     Amputated great toe     Anxiety     Shortness of breath     PVC's (premature ventricular contractions)     Proteinuria     Chronic kidney disease, stage III (moderate)     History of breast cancer     History of skin cancer     Major depression     Carotid artery disease     Ectatic aorta     Primary open angle glaucoma of both eyes, severe stage     Pseudophakia of both eyes     Refractive error     Right shoulder pain     Closed left arm fracture     Paronychia of finger of right hand            /64   Pulse (!) 56   Resp 18   Ht 5' 4" (1.626 m)   Wt 37.3 kg (82 lb 3.7 oz)   SpO2 (!) 92%   BMI 14.12 kg/m²   Body mass index is 14.12 kg/m².    Review of Systems   Constitutional: Negative.    HENT: Negative.    Respiratory:        See HPI   Musculoskeletal: Positive for arthralgias.   Skin:        Bruise to left face from fallen lamp   All other systems reviewed and are negative.    Objective:      Physical Exam   Constitutional: She is oriented to person, place, and time.   Thin frail elderly female   Neck: Normal range of motion. Neck supple.   Cardiovascular: Normal rate and regular " rhythm.    Pulmonary/Chest: Effort normal and breath sounds normal.   Abdominal: Soft.   Neurological: She is alert and oriented to person, place, and time.   Skin: Skin is warm and dry.   Psychiatric: She has a normal mood and affect.       Assessment:       1. Chronic obstructive pulmonary disease, unspecified COPD type    2. Cough        Outpatient Encounter Prescriptions as of 7/11/2017   Medication Sig Dispense Refill    albuterol 90 mcg/actuation inhaler Inhale 2 puffs into the lungs every 4 (four) hours as needed for Wheezing. 1 Inhaler 2    albuterol-ipratropium 2.5mg-0.5mg/3mL (DUO-NEB) 0.5 mg-3 mg(2.5 mg base)/3 mL nebulizer solution Take 3 mLs by nebulization every 4 (four) hours as needed for Wheezing. Every 6-8 hours 360 mL 6    allopurinol (ZYLOPRIM) 100 MG tablet Take 2 tablets (200 mg total) by mouth once daily. 60 tablet 6    aspirin (ECOTRIN) 81 MG EC tablet Take 81 mg by mouth once daily.      budesonide-formoterol 80-4.5 mcg (SYMBICORT) 80-4.5 mcg/actuation HFAA INHALE 2 PUFFS BY MOUTH 2 TIMES DAILY (RINSE MOUTH AFTER USE) 10.2 g 11    citalopram (CELEXA) 40 MG tablet TAKE ONE TABLET BY MOUTH ONCE DAILY 30 tablet 4    clonazePAM (KLONOPIN) 1 MG tablet Take 1.5 tablets (1.5 mg total) by mouth once daily. 45 tablet 0    cloNIDine (CATAPRES) 0.1 MG tablet TAKE 1 TABLET BY MOUTH ONCE DAILY 90 tablet 2    hydrocodone-acetaminophen 5-325mg (NORCO) 5-325 mg per tablet Take 1/2 to 1 tablet one time during the the day as needed and 1 1/2 tablet at night 75 tablet 0    latanoprost 0.005 % ophthalmic solution INSTILL 1 DROP IN EACH EYE EVERY EVENING 2.5 mL 12    levothyroxine (SYNTHROID) 50 MCG tablet Take 1 tablet (50 mcg total) by mouth once daily. 30 tablet 6    MULTIVITAMIN Take 1 tablet by mouth Daily.      mupirocin (BACTROBAN) 2 % ointment Apply topically 3 (three) times daily. 30 g 0    [DISCONTINUED] doxycycline (MONODOX) 100 MG capsule Take 1 capsule (100 mg total) by mouth 2 (two)  times daily. 20 capsule 0     No facility-administered encounter medications on file as of 7/11/2017.      Orders Placed This Encounter   Procedures    X-Ray Chest PA And Lateral     Standing Status:   Future     Standing Expiration Date:   7/11/2018     Plan:      Continue Symbicort, mucinex, neb txs  Follow up in 6 months with cxr or call earlier if any problems

## 2017-07-12 ENCOUNTER — OFFICE VISIT (OUTPATIENT)
Dept: INTERNAL MEDICINE | Facility: CLINIC | Age: 82
End: 2017-07-12
Payer: MEDICARE

## 2017-07-12 VITALS
TEMPERATURE: 97 F | HEART RATE: 62 BPM | HEIGHT: 63 IN | BODY MASS INDEX: 14.45 KG/M2 | DIASTOLIC BLOOD PRESSURE: 70 MMHG | SYSTOLIC BLOOD PRESSURE: 118 MMHG | WEIGHT: 81.56 LBS

## 2017-07-12 DIAGNOSIS — J44.9 CHRONIC OBSTRUCTIVE PULMONARY DISEASE, UNSPECIFIED COPD TYPE: Primary | Chronic | ICD-10-CM

## 2017-07-12 DIAGNOSIS — E03.4 HYPOTHYROIDISM DUE TO ACQUIRED ATROPHY OF THYROID: ICD-10-CM

## 2017-07-12 DIAGNOSIS — M25.511 RIGHT SHOULDER PAIN, UNSPECIFIED CHRONICITY: ICD-10-CM

## 2017-07-12 DIAGNOSIS — F41.9 ANXIETY: ICD-10-CM

## 2017-07-12 DIAGNOSIS — I10 ESSENTIAL HYPERTENSION: Chronic | ICD-10-CM

## 2017-07-12 PROCEDURE — 1126F AMNT PAIN NOTED NONE PRSNT: CPT | Mod: S$GLB,,, | Performed by: FAMILY MEDICINE

## 2017-07-12 PROCEDURE — 99999 PR PBB SHADOW E&M-EST. PATIENT-LVL III: CPT | Mod: PBBFAC,,, | Performed by: FAMILY MEDICINE

## 2017-07-12 PROCEDURE — 1159F MED LIST DOCD IN RCRD: CPT | Mod: S$GLB,,, | Performed by: FAMILY MEDICINE

## 2017-07-12 PROCEDURE — 99213 OFFICE O/P EST LOW 20 MIN: CPT | Mod: S$GLB,,, | Performed by: FAMILY MEDICINE

## 2017-07-12 PROCEDURE — 99499 UNLISTED E&M SERVICE: CPT | Mod: S$GLB,,, | Performed by: FAMILY MEDICINE

## 2017-07-12 RX ORDER — LEVOTHYROXINE SODIUM 50 UG/1
50 TABLET ORAL DAILY
Qty: 90 TABLET | Refills: 3 | Status: SHIPPED | OUTPATIENT
Start: 2017-07-12 | End: 2018-08-30 | Stop reason: SDUPTHER

## 2017-07-12 NOTE — PROGRESS NOTES
Subjective:       Patient ID: Anju Rivera is a 87 y.o. female.    Chief Complaint: Follow-up and Medication Refill    Left forehead bruse:  Pt reports that 3 nights ago hit her head with a lamp getting up at night.       Medication Refill   This is a chronic problem. The current episode started more than 1 month ago. The problem occurs constantly. The problem has been waxing and waning. Pertinent negatives include no arthralgias, change in bowel habit, chest pain, headaches, joint swelling, myalgias, nausea, neck pain, swollen glands, urinary symptoms, visual change, vomiting or weakness. Nothing aggravates the symptoms. She has tried nothing for the symptoms. The treatment provided moderate relief.     Review of Systems   Constitutional: Positive for activity change. Negative for unexpected weight change.   HENT: Negative for hearing loss, rhinorrhea and trouble swallowing.    Eyes: Negative for discharge and visual disturbance.   Respiratory: Negative for chest tightness and wheezing.    Cardiovascular: Negative for chest pain and palpitations.   Gastrointestinal: Negative for blood in stool, change in bowel habit, constipation, diarrhea, nausea and vomiting.   Endocrine: Negative for polydipsia and polyuria.   Genitourinary: Negative for difficulty urinating, dysuria, hematuria and menstrual problem.   Musculoskeletal: Negative for arthralgias, joint swelling, myalgias and neck pain.   Neurological: Negative for weakness and headaches.   Hematological: Negative.    Psychiatric/Behavioral: Negative.  Negative for confusion and dysphoric mood.       Objective:      Physical Exam   Constitutional: She is oriented to person, place, and time. She appears well-developed and well-nourished.   HENT:   Head:       Cardiovascular: Normal rate and regular rhythm.    Pulmonary/Chest: Effort normal and breath sounds normal.   Abdominal: Soft. Bowel sounds are normal.   Musculoskeletal:        Right shoulder: She  exhibits decreased range of motion and spasm.   Neurological: She is alert and oriented to person, place, and time.   Skin: Skin is warm and dry.   Psychiatric: She has a normal mood and affect. Her behavior is normal.       Assessment:       1. Chronic obstructive pulmonary disease, unspecified COPD type    2. Essential hypertension    3. Anxiety    4. Right shoulder pain, unspecified chronicity    5. Hypothyroidism due to acquired atrophy of thyroid        Plan:       Chronic obstructive pulmonary disease, unspecified COPD type  Comments:  Pt just saw Pulmonary and will continue on symbicort and increase the duoneb    Essential hypertension  Comments:  BP is well controlled    Anxiety  Comments:  Will continue with klonopin .5 mg    Right shoulder pain, unspecified chronicity  Comments:  Will try arthritic cream.     Hypothyroidism due to acquired atrophy of thyroid  Comments:  Will continue with the levothyroxine    Other orders  -     levothyroxine (SYNTHROID) 50 MCG tablet; Take 1 tablet (50 mcg total) by mouth once daily.  Dispense: 90 tablet; Refill: 3

## 2017-07-17 ENCOUNTER — OFFICE VISIT (OUTPATIENT)
Dept: OPHTHALMOLOGY | Facility: CLINIC | Age: 82
End: 2017-07-17
Payer: MEDICARE

## 2017-07-17 DIAGNOSIS — Z96.1 PSEUDOPHAKIA OF BOTH EYES: ICD-10-CM

## 2017-07-17 DIAGNOSIS — H40.1133 PRIMARY OPEN ANGLE GLAUCOMA OF BOTH EYES, SEVERE STAGE: Primary | ICD-10-CM

## 2017-07-17 PROCEDURE — 92012 INTRM OPH EXAM EST PATIENT: CPT | Mod: S$GLB,,, | Performed by: OPHTHALMOLOGY

## 2017-07-17 PROCEDURE — 99999 PR PBB SHADOW E&M-EST. PATIENT-LVL II: CPT | Mod: PBBFAC,,, | Performed by: OPHTHALMOLOGY

## 2017-07-17 NOTE — PROGRESS NOTES
SUBJECTIVE:   Anju Rivera is a 87 y.o. female   Corrected distance visual acuity was 20/40-1 in the right eye and 20/25 in the left eye.   Chief Complaint   Patient presents with    Glaucoma     Latanoprost qhs OU        HPI:  HPI     Glaucoma    Additional comments: Latanoprost qhs OU           Comments   Patient denies changes from last visit. Refresh has been helping with the   burning. Using gtts as directed.      1. PCIOL OU  2. Coag with enlarged cups (init 38/18) Goal < 16  3. PVD OD  4. H/O narrow angles  5. Blepharitis    Latanoprost qhs OU  Refresh PRN       Last edited by Bruna Ng MA on 7/17/2017 11:21 AM. (History)        Assessment /Plan :  1. Primary open angle glaucoma of both eyes, severe stage Doing well, IOP within acceptable range relative to target IOP and no evidence of progression. Continue current treatment. Reviewed importance of continued compliance with treatment and follow up.     2. Pseudophakia of both eyes stable       Return to clinic in 4 months  or as needed.  With 24-2 HVF, Dilation and SDP's

## 2017-07-28 ENCOUNTER — PATIENT MESSAGE (OUTPATIENT)
Dept: INTERNAL MEDICINE | Facility: CLINIC | Age: 82
End: 2017-07-28

## 2017-07-31 ENCOUNTER — TELEPHONE (OUTPATIENT)
Dept: INTERNAL MEDICINE | Facility: CLINIC | Age: 82
End: 2017-07-31

## 2017-07-31 ENCOUNTER — PATIENT MESSAGE (OUTPATIENT)
Dept: INTERNAL MEDICINE | Facility: CLINIC | Age: 82
End: 2017-07-31

## 2017-07-31 DIAGNOSIS — M89.9 BONE DISORDER: Primary | ICD-10-CM

## 2017-08-01 NOTE — TELEPHONE ENCOUNTER
Pt Advice   Message Contents   [facility] FERRER Staff   Caller: Tigist Eulalio/[facility] (Today,  2:19 PM)             States she's calling regarding a patient and she's on oxygen. She doesn't have the patients name, the daughters name is Violetta Mao. Please call Tigist Tsai at [phone]. Thank you        Returned call. Spoke with Tigist Tsai. Patient is coming in tomorrow to have TB test placed.

## 2017-08-02 ENCOUNTER — TELEPHONE (OUTPATIENT)
Dept: INTERNAL MEDICINE | Facility: CLINIC | Age: 82
End: 2017-08-02

## 2017-08-02 ENCOUNTER — CLINICAL SUPPORT (OUTPATIENT)
Dept: INTERNAL MEDICINE | Facility: CLINIC | Age: 82
End: 2017-08-02
Payer: MEDICARE

## 2017-08-02 PROCEDURE — 86580 TB INTRADERMAL TEST: CPT | Mod: S$GLB,,, | Performed by: FAMILY MEDICINE

## 2017-08-02 PROCEDURE — 99999 PR PBB SHADOW E&M-EST. PATIENT-LVL II: CPT | Mod: PBBFAC,,,

## 2017-08-02 NOTE — PROGRESS NOTES
Patient presents to clinic for PPD reading. Patient will return on Friday at 2:00 pm. Patient tolerated well, voiced no complaints.

## 2017-08-04 ENCOUNTER — CLINICAL SUPPORT (OUTPATIENT)
Dept: INTERNAL MEDICINE | Facility: CLINIC | Age: 82
End: 2017-08-04
Payer: MEDICARE

## 2017-08-11 ENCOUNTER — PATIENT MESSAGE (OUTPATIENT)
Dept: INTERNAL MEDICINE | Facility: CLINIC | Age: 82
End: 2017-08-11

## 2017-08-14 ENCOUNTER — PATIENT MESSAGE (OUTPATIENT)
Dept: INTERNAL MEDICINE | Facility: CLINIC | Age: 82
End: 2017-08-14

## 2017-08-14 ENCOUNTER — TELEPHONE (OUTPATIENT)
Dept: INTERNAL MEDICINE | Facility: CLINIC | Age: 82
End: 2017-08-14

## 2017-08-14 NOTE — TELEPHONE ENCOUNTER
----- Message from Leann Cruz sent at 8/14/2017 10:51 AM CDT -----  Contact: Violetta pts daughter  Violetta needs to speak to the nurse regarding paperwork she needs filled out /please call [phone]/ma

## 2017-08-14 NOTE — TELEPHONE ENCOUNTER
Patients daughter will have Home health agency fax us the paperwork that is needed to be filled out.

## 2017-08-15 ENCOUNTER — APPOINTMENT (OUTPATIENT)
Dept: RADIOLOGY | Facility: CLINIC | Age: 82
End: 2017-08-15
Attending: FAMILY MEDICINE
Payer: MEDICARE

## 2017-08-15 ENCOUNTER — OFFICE VISIT (OUTPATIENT)
Dept: INTERNAL MEDICINE | Facility: CLINIC | Age: 82
End: 2017-08-15
Payer: MEDICARE

## 2017-08-15 VITALS
SYSTOLIC BLOOD PRESSURE: 156 MMHG | BODY MASS INDEX: 14.8 KG/M2 | TEMPERATURE: 97 F | WEIGHT: 83.56 LBS | HEART RATE: 63 BPM | HEIGHT: 63 IN | DIASTOLIC BLOOD PRESSURE: 84 MMHG

## 2017-08-15 DIAGNOSIS — Z71.89 ADVANCED DIRECTIVES, COUNSELING/DISCUSSION: Primary | ICD-10-CM

## 2017-08-15 DIAGNOSIS — I25.83 CORONARY ARTERY DISEASE DUE TO LIPID RICH PLAQUE: Chronic | ICD-10-CM

## 2017-08-15 DIAGNOSIS — R52 PAIN: ICD-10-CM

## 2017-08-15 DIAGNOSIS — I25.10 CORONARY ARTERY DISEASE DUE TO LIPID RICH PLAQUE: Chronic | ICD-10-CM

## 2017-08-15 DIAGNOSIS — M89.9 BONE DISORDER: ICD-10-CM

## 2017-08-15 DIAGNOSIS — J44.9 CHRONIC OBSTRUCTIVE PULMONARY DISEASE, UNSPECIFIED COPD TYPE: Chronic | ICD-10-CM

## 2017-08-15 PROCEDURE — 1159F MED LIST DOCD IN RCRD: CPT | Mod: S$GLB,,, | Performed by: FAMILY MEDICINE

## 2017-08-15 PROCEDURE — 77080 DXA BONE DENSITY AXIAL: CPT | Mod: 26,,, | Performed by: INTERNAL MEDICINE

## 2017-08-15 PROCEDURE — 99213 OFFICE O/P EST LOW 20 MIN: CPT | Mod: S$GLB,,, | Performed by: FAMILY MEDICINE

## 2017-08-15 PROCEDURE — 3008F BODY MASS INDEX DOCD: CPT | Mod: S$GLB,,, | Performed by: FAMILY MEDICINE

## 2017-08-15 PROCEDURE — 99499 UNLISTED E&M SERVICE: CPT | Mod: S$GLB,,, | Performed by: FAMILY MEDICINE

## 2017-08-15 PROCEDURE — 99999 PR PBB SHADOW E&M-EST. PATIENT-LVL III: CPT | Mod: PBBFAC,,, | Performed by: FAMILY MEDICINE

## 2017-08-15 RX ORDER — HYDROCODONE BITARTRATE AND ACETAMINOPHEN 5; 325 MG/1; MG/1
TABLET ORAL
Qty: 75 TABLET | Refills: 0 | Status: SHIPPED | OUTPATIENT
Start: 2017-08-15 | End: 2017-09-28 | Stop reason: SDUPTHER

## 2017-08-15 RX ORDER — CLONAZEPAM 1 MG/1
TABLET ORAL
Qty: 45 TABLET | Refills: 0 | Status: SHIPPED | OUTPATIENT
Start: 2017-08-15 | End: 2017-08-15 | Stop reason: SDUPTHER

## 2017-08-15 NOTE — PROGRESS NOTES
Subjective:       Patient ID: Anju Rivera is a 88 y.o. female.    Chief Complaint: paperwork    F/U:      Pt is a 88 year old who is going into [facility] while her daughter goes away for 1 week. Discussed with pt advanced directives. Reviewed her chronic medical history as well as medications all of which are stable. Pt chronic conditions include: CAD, HTN, COPD, Hyperlipidemia and anxiety      Review of Systems   Constitutional: Negative.    Respiratory: Negative.    Cardiovascular: Negative.    Genitourinary: Negative.    Neurological: Negative.    Hematological: Negative.    Psychiatric/Behavioral: Negative.        Objective:      Physical Exam   Constitutional: She is oriented to person, place, and time. She appears well-developed and well-nourished.   Cardiovascular: Normal rate and regular rhythm.  Exam reveals no friction rub.    No murmur heard.  Pulmonary/Chest: Effort normal and breath sounds normal. She has no wheezes. She has no rales.   Neurological: She is alert and oriented to person, place, and time.   Psychiatric: She has a normal mood and affect. Her behavior is normal.       Assessment:       1. Pain        Plan:       Advanced directives, counseling/discussion  Comments:  Discussed with pt and filled out advanced directives for nursing home placement    Coronary artery disease due to lipid rich plaque  Comments:  Stable at this time will continue to monitor her BP    Chronic obstructive pulmonary disease, unspecified COPD type  Comments:  Stable at this time    Pain  Comments:  Will continue with the norco.  Orders:  -     hydrocodone-acetaminophen 5-325mg (NORCO) 5-325 mg per tablet; Take 1/2 to 1 tablet one time during the the day as needed and 1 1/2 tablet at night  Dispense: 75 tablet; Refill: 0

## 2017-09-05 ENCOUNTER — OFFICE VISIT (OUTPATIENT)
Dept: CARDIOLOGY | Facility: CLINIC | Age: 82
End: 2017-09-05
Payer: MEDICARE

## 2017-09-05 ENCOUNTER — CLINICAL SUPPORT (OUTPATIENT)
Dept: CARDIOLOGY | Facility: CLINIC | Age: 82
End: 2017-09-05
Payer: MEDICARE

## 2017-09-05 VITALS
DIASTOLIC BLOOD PRESSURE: 70 MMHG | BODY MASS INDEX: 14.57 KG/M2 | HEIGHT: 63 IN | HEART RATE: 80 BPM | WEIGHT: 82.25 LBS | SYSTOLIC BLOOD PRESSURE: 120 MMHG

## 2017-09-05 DIAGNOSIS — R00.1 SINUS BRADYCARDIA: Chronic | ICD-10-CM

## 2017-09-05 DIAGNOSIS — I77.9 CAROTID ARTERY DISEASE, UNSPECIFIED LATERALITY: Primary | ICD-10-CM

## 2017-09-05 DIAGNOSIS — I35.9 AORTIC VALVE DISORDER: Chronic | ICD-10-CM

## 2017-09-05 DIAGNOSIS — I25.10 CORONARY ARTERY DISEASE DUE TO LIPID RICH PLAQUE: Primary | Chronic | ICD-10-CM

## 2017-09-05 DIAGNOSIS — R06.02 SHORTNESS OF BREATH: ICD-10-CM

## 2017-09-05 DIAGNOSIS — E78.2 MIXED HYPERLIPIDEMIA: Chronic | ICD-10-CM

## 2017-09-05 DIAGNOSIS — I10 ESSENTIAL HYPERTENSION: Chronic | ICD-10-CM

## 2017-09-05 DIAGNOSIS — I50.32 CHRONIC DIASTOLIC HEART FAILURE: Chronic | ICD-10-CM

## 2017-09-05 DIAGNOSIS — I77.9 CAROTID ARTERY DISEASE, UNSPECIFIED LATERALITY: ICD-10-CM

## 2017-09-05 DIAGNOSIS — I27.20 PULMONARY HYPERTENSION: Chronic | ICD-10-CM

## 2017-09-05 DIAGNOSIS — I49.3 PVC'S (PREMATURE VENTRICULAR CONTRACTIONS): ICD-10-CM

## 2017-09-05 DIAGNOSIS — I34.0 NON-RHEUMATIC MITRAL REGURGITATION: Chronic | ICD-10-CM

## 2017-09-05 DIAGNOSIS — J44.9 CHRONIC OBSTRUCTIVE PULMONARY DISEASE, UNSPECIFIED COPD TYPE: Chronic | ICD-10-CM

## 2017-09-05 DIAGNOSIS — I25.83 CORONARY ARTERY DISEASE DUE TO LIPID RICH PLAQUE: Primary | Chronic | ICD-10-CM

## 2017-09-05 PROCEDURE — 93000 ELECTROCARDIOGRAM COMPLETE: CPT | Mod: S$GLB,,, | Performed by: INTERNAL MEDICINE

## 2017-09-05 PROCEDURE — 99499 UNLISTED E&M SERVICE: CPT | Mod: S$GLB,,, | Performed by: INTERNAL MEDICINE

## 2017-09-05 PROCEDURE — 1159F MED LIST DOCD IN RCRD: CPT | Mod: S$GLB,,, | Performed by: INTERNAL MEDICINE

## 2017-09-05 PROCEDURE — 99214 OFFICE O/P EST MOD 30 MIN: CPT | Mod: S$GLB,,, | Performed by: INTERNAL MEDICINE

## 2017-09-05 PROCEDURE — 3008F BODY MASS INDEX DOCD: CPT | Mod: S$GLB,,, | Performed by: INTERNAL MEDICINE

## 2017-09-05 PROCEDURE — 99999 PR PBB SHADOW E&M-EST. PATIENT-LVL III: CPT | Mod: PBBFAC,,, | Performed by: INTERNAL MEDICINE

## 2017-09-05 NOTE — PROGRESS NOTES
"Subjective:    Patient ID:  Anju Rivera is a 88 y.o. female who presents for evaluation of Coronary Artery Disease; Congestive Heart Failure (chronic diastolic heart failure); Mitral Regurgitation; Hypertension; and Shortness of Breath      HPI Mrs. Rivera returns for follow-up.    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 Negative stress mpi 2/16 and echo 2/16 showed normal LVEF, mild AS, mild PHTN.  She had normal diastolic function on echo.  Now here for f/u.  No cp sxs. States has had some left arm pains, every few weeks, short lasting, and she thinks it could be "angina".  ECG today has been reviewed and is normal.   Chronic CHRISTINE.  Diastolic CHF is stable. No pnd/orthopnea.  No LE edema.  States she feels strong in am.   No palpitations.  Some lightheadedness. No syncope/presyncope.  Broke her left arm earlier this year.  Off many of her meds now, PCP took off due to low BP, fatigue.  BP and lipids controlled.    Patient Active Problem List   Diagnosis    Coronary artery disease    Chronic diastolic heart failure    Mitral regurgitation    COPD (chronic obstructive pulmonary disease)    Hypertension    Aortic valve disorder    Pulmonary hypertension    Sinus bradycardia    Mixed hyperlipidemia    COAG (chronic open-angle glaucoma) - Both Eyes    HX of MI    Hypothyroidism    Peripheral vascular disease    Amputated great toe    Anxiety    Shortness of breath    PVC's (premature ventricular contractions)    Proteinuria    Chronic kidney disease, stage III (moderate)    History of breast cancer    History of skin cancer    Major depression    Carotid artery disease    Ectatic aorta    Primary open angle glaucoma of both eyes, severe stage    " Pseudophakia of both eyes    Refractive error    Right shoulder pain    Closed left arm fracture    Paronychia of finger of right hand    Bone disorder     Past Medical History:   Diagnosis Date    Anxiety     Aortic valve disorder 6/17/2013    Arthritis     Breast cancer 1981    Cataract     Chronic bronchitis     Chronic diastolic heart failure 6/17/2013    COPD (chronic obstructive pulmonary disease) 6/17/2013    Coronary artery disease 6/17/2013    Decubitus skin ulcer 11/11/2014    Depression     Disorder of kidney and ureter     Emphysema of lung     copd    Fall     Glaucoma     Hyperlipidemia     Hypertension 6/17/2013    Hyponatremia 6/3/2014    Hypothyroidism     Kidney cysts     US retroper    MI (myocardial infarction) 05/1981    Mitral regurgitation 6/17/2013    Nail abnormality 7/16/2014    Osteoporosis     Peripheral vascular disease     Pneumonia     dx 02/2012    Pulmonary hypertension 6/17/2013    Sinus bradycardia 6/18/2013    Skin cancer     Squamous cell carcinoma     Thyroid disease     hypothyroidism       Current Outpatient Prescriptions:     albuterol 90 mcg/actuation inhaler, Inhale 2 puffs into the lungs every 4 (four) hours as needed for Wheezing., Disp: 1 Inhaler, Rfl: 2    albuterol-ipratropium 2.5mg-0.5mg/3mL (DUO-NEB) 0.5 mg-3 mg(2.5 mg base)/3 mL nebulizer solution, Take 3 mLs by nebulization every 4 (four) hours as needed for Wheezing. Every 6-8 hours, Disp: 360 mL, Rfl: 6    allopurinol (ZYLOPRIM) 100 MG tablet, Take 2 tablets (200 mg total) by mouth once daily., Disp: 60 tablet, Rfl: 6    aspirin (ECOTRIN) 81 MG EC tablet, Take 81 mg by mouth once daily., Disp: , Rfl:     budesonide-formoterol 80-4.5 mcg (SYMBICORT) 80-4.5 mcg/actuation HFAA, INHALE 2 PUFFS BY MOUTH 2 TIMES DAILY (RINSE MOUTH AFTER USE), Disp: 10.2 g, Rfl: 11    CARBOXYMETHYLCELLULOSE SODIUM (REFRESH OPHT), Apply to eye., Disp: , Rfl:     citalopram (CELEXA) 40 MG tablet,  "TAKE ONE TABLET BY MOUTH ONCE DAILY, Disp: 30 tablet, Rfl: 4    clonazePAM (KLONOPIN) 1 MG tablet, Take 1.5 tablets (1.5 mg total) by mouth once daily., Disp: 45 tablet, Rfl: 0    cloNIDine (CATAPRES) 0.1 MG tablet, TAKE 1 TABLET BY MOUTH ONCE DAILY, Disp: 90 tablet, Rfl: 2    hydrocodone-acetaminophen 5-325mg (NORCO) 5-325 mg per tablet, Take 1/2 to 1 tablet one time during the the day as needed and 1 1/2 tablet at night, Disp: 75 tablet, Rfl: 0    latanoprost 0.005 % ophthalmic solution, INSTILL 1 DROP IN EACH EYE EVERY EVENING, Disp: 2.5 mL, Rfl: 12    levothyroxine (SYNTHROID) 50 MCG tablet, Take 1 tablet (50 mcg total) by mouth once daily., Disp: 90 tablet, Rfl: 3    MULTIVITAMIN, Take 1 tablet by mouth Daily., Disp: , Rfl:     mupirocin (BACTROBAN) 2 % ointment, Apply topically 3 (three) times daily., Disp: 30 g, Rfl: 0      Review of Systems   Constitution: Positive for weakness and malaise/fatigue.   HENT: Negative.    Eyes: Negative.    Cardiovascular: Positive for dyspnea on exertion.   Respiratory: Positive for shortness of breath.    Endocrine: Negative.    Hematologic/Lymphatic: Negative.    Skin: Negative.    Musculoskeletal: Positive for arthritis and joint pain.   Gastrointestinal: Negative.    Genitourinary: Negative.    Neurological: Positive for light-headedness and loss of balance.   Psychiatric/Behavioral: Negative.    Allergic/Immunologic: Negative.        /70   Pulse 80   Ht 5' 3" (1.6 m)   Wt 37.3 kg (82 lb 3.7 oz)   BMI 14.57 kg/m²     Wt Readings from Last 3 Encounters:   09/05/17 37.3 kg (82 lb 3.7 oz)   08/15/17 37.9 kg (83 lb 8.9 oz)   07/12/17 37 kg (81 lb 9.1 oz)     Temp Readings from Last 3 Encounters:   08/15/17 97.1 °F (36.2 °C) (Tympanic)   07/12/17 97 °F (36.1 °C) (Tympanic)   05/09/17 97.9 °F (36.6 °C) (Tympanic)     BP Readings from Last 3 Encounters:   09/05/17 120/70   08/15/17 (!) 156/84   07/12/17 118/70     Pulse Readings from Last 3 Encounters:   09/05/17 " 80   08/15/17 63   07/12/17 62          Objective:    Physical Exam   Constitutional: She is oriented to person, place, and time. Vital signs are normal. She appears well-developed and well-nourished. She is active and cooperative. She does not have a sickly appearance. She does not appear ill. No distress.   HENT:   Head: Normocephalic.   Neck: Neck supple. No JVD present. Carotid bruit is not present.   Cardiovascular: Normal rate, regular rhythm, S1 normal, S2 normal, normal heart sounds and normal pulses.  PMI is not displaced.  Exam reveals no gallop and no friction rub.    No murmur heard.  Pulses:       Radial pulses are 2+ on the right side, and 2+ on the left side.   Pulmonary/Chest: Effort normal and breath sounds normal. She has no wheezes. She has no rales.   Abdominal: Soft. Normal appearance and bowel sounds are normal. She exhibits no distension. There is no tenderness.   Musculoskeletal: She exhibits no edema.   Lymphadenopathy:     She has no cervical adenopathy.   Neurological: She is alert and oriented to person, place, and time.   Skin: Skin is warm. She is not diaphoretic.   Psychiatric: She has a normal mood and affect. Her behavior is normal.   Nursing note and vitals reviewed.      I have reviewed all pertinent labs and cardiac studies.      Chemistry        Component Value Date/Time     04/07/2017 1509    K 5.2 (H) 04/13/2017 1251     04/07/2017 1509    CO2 22 (L) 04/07/2017 1509    BUN 44 (H) 04/07/2017 1509    CREATININE 1.2 04/07/2017 1509    GLU 71 04/07/2017 1509        Component Value Date/Time    CALCIUM 9.3 04/07/2017 1509    ALKPHOS 60 04/07/2017 1509    AST 35 04/07/2017 1509    ALT 18 04/07/2017 1509    BILITOT 0.6 04/07/2017 1509    ESTGFRAFRICA 47 (A) 04/07/2017 1509    EGFRNONAA 41 (A) 04/07/2017 1509        Lab Results   Component Value Date    WBC 5.99 09/10/2013    HGB 11.3 (L) 09/10/2013    HCT 35.4 (L) 09/10/2013    MCV 97 09/10/2013     09/10/2013      Lab Results   Component Value Date    HGBA1C 5.5 03/17/2008     Lab Results   Component Value Date    CHOL 179 08/01/2016    CHOL 185 03/18/2014    CHOL 162 09/10/2013     Lab Results   Component Value Date    HDL 61 08/01/2016    HDL 72 03/18/2014    HDL 57 09/10/2013     Lab Results   Component Value Date    LDLCALC 96.2 08/01/2016    LDLCALC 99.6 03/18/2014    LDLCALC 94.6 09/10/2013     Lab Results   Component Value Date    TRIG 109 08/01/2016    TRIG 67 03/18/2014    TRIG 52 09/10/2013     Lab Results   Component Value Date    CHOLHDL 34.1 08/01/2016    CHOLHDL 38.9 03/18/2014    CHOLHDL 35.2 09/10/2013           Assessment:       1. Coronary artery disease due to lipid rich plaque    2. Pulmonary hypertension    3. Chronic obstructive pulmonary disease, unspecified COPD type    4. Chronic diastolic heart failure    5. Aortic valve disorder    6. Essential hypertension    7. Mixed hyperlipidemia    8. Non-rheumatic mitral regurgitation    9. Shortness of breath    10. Sinus bradycardia    11. PVC's (premature ventricular contractions)         Plan:             STABLE CV CONDITIONS.  ARM DISCOMFORT, CANNOT EXCLUDE ANGINA BUT NO CP SXS OR OTHER SXS TO SUGGEST ANGINA.  NORMAL ECG TODAY.  REASSURANCE.  OFFERED SL NTG PRN BUT SHE DOES NOT WANT RIGHT NOW.  STABLE DIASTOLIC CHF, COMPENSATED.  CARDIAC DIET  F/U 9 MONTHS, SOONER IF NEEDED.

## 2017-09-21 RX ORDER — CLONAZEPAM 1 MG/1
TABLET ORAL
Qty: 45 TABLET | Refills: 0 | Status: SHIPPED | OUTPATIENT
Start: 2017-09-21 | End: 2017-10-18 | Stop reason: SDUPTHER

## 2017-09-28 ENCOUNTER — PATIENT MESSAGE (OUTPATIENT)
Dept: INTERNAL MEDICINE | Facility: CLINIC | Age: 82
End: 2017-09-28

## 2017-09-28 DIAGNOSIS — R52 PAIN: ICD-10-CM

## 2017-09-29 RX ORDER — HYDROCODONE BITARTRATE AND ACETAMINOPHEN 5; 325 MG/1; MG/1
TABLET ORAL
Qty: 75 TABLET | Refills: 0 | Status: SHIPPED | OUTPATIENT
Start: 2017-09-29 | End: 2017-11-13 | Stop reason: SDUPTHER

## 2017-09-29 RX ORDER — ALLOPURINOL 100 MG/1
TABLET ORAL
Qty: 60 TABLET | Refills: 0 | Status: SHIPPED | OUTPATIENT
Start: 2017-09-29 | End: 2017-10-23 | Stop reason: SDUPTHER

## 2017-10-18 RX ORDER — CLONAZEPAM 1 MG/1
TABLET ORAL
Qty: 45 TABLET | Refills: 0 | Status: SHIPPED | OUTPATIENT
Start: 2017-10-18 | End: 2017-11-16 | Stop reason: SDUPTHER

## 2017-10-19 ENCOUNTER — LAB VISIT (OUTPATIENT)
Dept: LAB | Facility: HOSPITAL | Age: 82
End: 2017-10-19
Attending: INTERNAL MEDICINE
Payer: MEDICARE

## 2017-10-19 DIAGNOSIS — N18.30 ANEMIA OF CHRONIC RENAL FAILURE, STAGE 3 (MODERATE): ICD-10-CM

## 2017-10-19 DIAGNOSIS — D63.1 ANEMIA OF CHRONIC RENAL FAILURE, STAGE 3 (MODERATE): ICD-10-CM

## 2017-10-19 DIAGNOSIS — N18.30 CKD (CHRONIC KIDNEY DISEASE) STAGE 3, GFR 30-59 ML/MIN: ICD-10-CM

## 2017-10-19 LAB
ALBUMIN SERPL BCP-MCNC: 3.1 G/DL
ANION GAP SERPL CALC-SCNC: 8 MMOL/L
BASOPHILS # BLD AUTO: 0.01 K/UL
BASOPHILS NFR BLD: 0.2 %
BUN SERPL-MCNC: 32 MG/DL
CALCIUM SERPL-MCNC: 9 MG/DL
CHLORIDE SERPL-SCNC: 107 MMOL/L
CO2 SERPL-SCNC: 25 MMOL/L
CREAT SERPL-MCNC: 1.3 MG/DL
DIFFERENTIAL METHOD: ABNORMAL
EOSINOPHIL # BLD AUTO: 0.3 K/UL
EOSINOPHIL NFR BLD: 5.7 %
ERYTHROCYTE [DISTWIDTH] IN BLOOD BY AUTOMATED COUNT: 13.5 %
EST. GFR  (AFRICAN AMERICAN): 42.3 ML/MIN/1.73 M^2
EST. GFR  (NON AFRICAN AMERICAN): 36.7 ML/MIN/1.73 M^2
GLUCOSE SERPL-MCNC: 80 MG/DL
HCT VFR BLD AUTO: 32.9 %
HGB BLD-MCNC: 10.2 G/DL
IMM GRANULOCYTES # BLD AUTO: 0.02 K/UL
IMM GRANULOCYTES NFR BLD AUTO: 0.3 %
LYMPHOCYTES # BLD AUTO: 1.2 K/UL
LYMPHOCYTES NFR BLD: 21.1 %
MCH RBC QN AUTO: 30.8 PG
MCHC RBC AUTO-ENTMCNC: 31 G/DL
MCV RBC AUTO: 99 FL
MONOCYTES # BLD AUTO: 0.6 K/UL
MONOCYTES NFR BLD: 9.7 %
NEUTROPHILS # BLD AUTO: 3.6 K/UL
NEUTROPHILS NFR BLD: 63 %
NRBC BLD-RTO: 0 /100 WBC
PHOSPHATE SERPL-MCNC: 3.3 MG/DL
PLATELET # BLD AUTO: 173 K/UL
PMV BLD AUTO: 11.7 FL
POTASSIUM SERPL-SCNC: 4.2 MMOL/L
RBC # BLD AUTO: 3.31 M/UL
SODIUM SERPL-SCNC: 140 MMOL/L
WBC # BLD AUTO: 5.77 K/UL

## 2017-10-19 PROCEDURE — 36415 COLL VENOUS BLD VENIPUNCTURE: CPT | Mod: PO

## 2017-10-19 PROCEDURE — 85025 COMPLETE CBC W/AUTO DIFF WBC: CPT

## 2017-10-19 PROCEDURE — 80069 RENAL FUNCTION PANEL: CPT

## 2017-10-23 ENCOUNTER — PATIENT MESSAGE (OUTPATIENT)
Dept: INTERNAL MEDICINE | Facility: CLINIC | Age: 82
End: 2017-10-23

## 2017-10-23 RX ORDER — ALLOPURINOL 100 MG/1
TABLET ORAL
Qty: 60 TABLET | Refills: 0 | Status: SHIPPED | OUTPATIENT
Start: 2017-10-23 | End: 2018-07-05 | Stop reason: SDUPTHER

## 2017-10-26 ENCOUNTER — OFFICE VISIT (OUTPATIENT)
Dept: NEPHROLOGY | Facility: CLINIC | Age: 82
End: 2017-10-26
Payer: MEDICARE

## 2017-10-26 ENCOUNTER — IMMUNIZATION (OUTPATIENT)
Dept: INTERNAL MEDICINE | Facility: CLINIC | Age: 82
End: 2017-10-26
Payer: MEDICARE

## 2017-10-26 VITALS
HEIGHT: 63 IN | WEIGHT: 85.13 LBS | DIASTOLIC BLOOD PRESSURE: 82 MMHG | SYSTOLIC BLOOD PRESSURE: 140 MMHG | BODY MASS INDEX: 15.08 KG/M2 | HEART RATE: 84 BPM

## 2017-10-26 DIAGNOSIS — R80.9 PROTEINURIA, UNSPECIFIED TYPE: ICD-10-CM

## 2017-10-26 DIAGNOSIS — N18.30 CKD (CHRONIC KIDNEY DISEASE) STAGE 3, GFR 30-59 ML/MIN: Primary | ICD-10-CM

## 2017-10-26 PROCEDURE — 99499 UNLISTED E&M SERVICE: CPT | Mod: S$GLB,,, | Performed by: INTERNAL MEDICINE

## 2017-10-26 PROCEDURE — 99999 PR PBB SHADOW E&M-EST. PATIENT-LVL III: CPT | Mod: PBBFAC,,, | Performed by: INTERNAL MEDICINE

## 2017-10-26 PROCEDURE — 99213 OFFICE O/P EST LOW 20 MIN: CPT | Mod: S$GLB,,, | Performed by: INTERNAL MEDICINE

## 2017-10-26 PROCEDURE — 90662 IIV NO PRSV INCREASED AG IM: CPT | Mod: S$GLB,,, | Performed by: FAMILY MEDICINE

## 2017-10-26 PROCEDURE — G0008 ADMIN INFLUENZA VIRUS VAC: HCPCS | Mod: S$GLB,,, | Performed by: FAMILY MEDICINE

## 2017-10-26 NOTE — PROGRESS NOTES
PROGRESS NOTE FOR ESTABLISHED PATIENT    PHYSICIAN REQUESTING THE CONSULT: Dr. Gallardo     REASON FOR VISIT: Renal insufficiency     88 y.o. female with history of CAD, CHF, HTN, pulmonary HTN, COPD, arthritis, breast CA presents to the renal clinic for evaluation of renal insufficiency.     Patient denies any pain, SOB, nausea, LE edema.       Past Medical History    Diagnosis  Date      Coronary artery disease  6/17/2013      Chronic diastolic heart failure  6/17/2013      Mitral regurgitation  6/17/2013      Hypertension  6/17/2013      Aortic valve disorder  6/17/2013      Pulmonary hypertension  6/17/2013      Sinus bradycardia  6/18/2013      COPD (chronic obstructive pulmonary disease)  6/17/2013      Arthritis       Emphysema of lung       copd      Thyroid disease       hypothyroidism      Pneumonia       dx 02/2012      MI (myocardial infarction)  05/1981      Breast cancer  1981      Glaucoma       Cataract       Skin cancer       Past Surgical History    Procedure  Date      Masectomy  1996      right breast      Finger amputation       digits 4, 5      Toe amputation       left foot great hallux      Wrist arthroplasty       left wrist      Orif hip fracture  11/2012      Eye surgery       right cataract removal. lens replacement      Cataract extraction       Allergies    Allergen  Reactions      Atorvastatin  Other (See Comments)      Dry mouth      Lactose  Diarrhea      Current Outpatient Prescriptions   Medication Sig Dispense Refill    albuterol 90 mcg/actuation inhaler Inhale 2 puffs into the lungs every 4 (four) hours as needed for Wheezing. 1 Inhaler 2    albuterol-ipratropium 2.5mg-0.5mg/3mL (DUO-NEB) 0.5 mg-3 mg(2.5 mg base)/3 mL nebulizer solution Take 3 mLs by nebulization every 4 (four) hours as needed for Wheezing. Every 6-8 hours 360 mL 6    allopurinol (ZYLOPRIM) 100 MG tablet TAKE 2 TABLETS BY MOUTH ONCE DAILY 60 tablet 0    aspirin (ECOTRIN) 81 MG  EC tablet Take 81 mg by mouth once daily.      budesonide-formoterol 80-4.5 mcg (SYMBICORT) 80-4.5 mcg/actuation HFAA INHALE 2 PUFFS BY MOUTH 2 TIMES DAILY (RINSE MOUTH AFTER USE) 10.2 g 11    CARBOXYMETHYLCELLULOSE SODIUM (REFRESH OPHT) Apply to eye.      citalopram (CELEXA) 40 MG tablet TAKE ONE TABLET BY MOUTH ONCE DAILY 30 tablet 4    clonazePAM (KLONOPIN) 1 MG tablet Take 1.5 tablets (1.5 mg total) by mouth once daily. 45 tablet 0    clonazePAM (KLONOPIN) 1 MG tablet TAKE 1 AND 1/2 TABLETS BY MOUTH ONCE DAILY 45 tablet 0    cloNIDine (CATAPRES) 0.1 MG tablet TAKE 1 TABLET BY MOUTH ONCE DAILY 90 tablet 2    hydrocodone-acetaminophen 5-325mg (NORCO) 5-325 mg per tablet Take 1/2 to 1 tablet one time during the the day as needed and 1 1/2 tablet at night 75 tablet 0    latanoprost 0.005 % ophthalmic solution INSTILL 1 DROP IN EACH EYE EVERY EVENING 2.5 mL 12    levothyroxine (SYNTHROID) 50 MCG tablet Take 1 tablet (50 mcg total) by mouth once daily. 90 tablet 3    MULTIVITAMIN Take 1 tablet by mouth Daily.      mupirocin (BACTROBAN) 2 % ointment Apply topically 3 (three) times daily. 30 g 0     No current facility-administered medications for this visit.          Family History    Problem  Relation  Age of Onset      Heart attack  Mother       Hypertension  Mother       Stroke  Mother       Arthritis  Father       Glaucoma  Daughter         h/o narrow angle        Strabismus  Neg Hx       Retinal detachment  Neg Hx       Macular degeneration  Neg Hx       Blindness  Neg Hx       Amblyopia  Neg Hx       History      Social History      Marital Status:        Spouse Name:  N/A      Number of Children:  N/A      Years of Education:  N/A      Occupational History      Not on file.      Social History Main Topics      Smoking status:  Former Smoker -- 3.0 packs/day for 40 years      Smokeless tobacco:  Never Used      Alcohol Use:  Yes       Comment: social use of wine      Drug Use:   "No      Sexually Active:  Not Currently      Birth Control/ Protection:  None      Other Topics  Concern      Not on file      Social History Narrative      No narrative on file      Review of Systems:   1. GENERAL: patient denies any fever, weight changes, generalized weakness, dizziness.   2. HEENT: patient denies headaches, visual disturbances, swallowing problems, sinus pain, nasal congestion.   3. CARDIOVASCULAR: patient denies chest pain, palpitations.   4. PULMONARY: patient denies current SOB, she denies coughing, hemoptysis, wheezing.   5. GASTROINTESTINAL: patient denies abdominal pain, nausea, vomiting, diarrhea.   6. GENITOURINARY: patient denies dysuria, hematuria, hesitancy, frequency  7. EXTREMITIES: patient denies LE edema, LE cramping  8. DERMATOLOGY: patient denies rashes, ulcers.   9. NEURO: patient denies tremors, extremity weakness, extremity numbness/tingling.   10. MUSCULOSKELETAL: patient denies joint swelling  11. HEMATOLOGY: patient denies rectal or gum bleeding.   12: PSYCH: patient denies anxiety, depression.       PHYSICAL EXAM:   BP (!) 140/82   Pulse 84   Ht 5' 3" (1.6 m)   Wt 38.6 kg (85 lb 1.6 oz)   BMI 15.07 kg/m²       GENERAL: Emaciated lady presents to clinic with non-labored breathing and wheelchair  HEENT: PERRL, no nasal discharge, no icterus, no oral exudates, moist mucosal membranes.   NECK: no thyroid mass, no lymphadenopathy.   HEART: RRR S1/S2, no rubs, good peripheral pulses.   LUNGS: CTA bilaterally, no wheezing, breathing is nonlabored.   ABDOMEN: soft, nontender, not distended, bowel sounds are present, no abdominal hernia.   EXTREM: no LE edema  SKIN: no rashes, skin is warm and dry.   NEURO: Alert, responsive    LABORATORY RESULTS:   Lab Results   Component Value Date    CREATININE 1.3 10/19/2017    BUN 32 (H) 10/19/2017     10/19/2017    K 4.2 10/19/2017     10/19/2017    CO2 25 10/19/2017     Lab Results   Component Value Date    ALBUMIN 3.1 (L) " 10/19/2017     Lab Results   Component Value Date    PTH 93 (H) 05/01/2014    CALCIUM 9.0 10/19/2017    PHOS 3.3 10/19/2017     Lab Results   Component Value Date    WBC 5.77 10/19/2017    HGB 10.2 (L) 10/19/2017    HCT 32.9 (L) 10/19/2017    MCV 99 (H) 10/19/2017     10/19/2017       Renal ultrasound from 1/18/12: right kidney 9 cm, left kidney 7.2 cm, bilateral simple cysts.     Protein Creatinine Ratios:    Creatinine, Random Ur   Date Value Ref Range Status   10/19/2017 38.0 15.0 - 325.0 mg/dL Final     Comment:     The random urine reference ranges provided were established   for 24 hour urine collections.  No reference ranges exist for  random urine specimens.  Correlate clinically.     11/05/2016 51.0 15.0 - 325.0 mg/dL Final     Comment:     The random urine reference ranges provided were established   for 24 hour urine collections.  No reference ranges exist for  random urine specimens.  Correlate clinically.     06/22/2016 69.0 15.0 - 325.0 mg/dL Final     Comment:     The random urine reference ranges provided were established   for 24 hour urine collections.  No reference ranges exist for  random urine specimens.  Correlate clinically.       Protein, Urine   Date Value Ref Range Status   03/26/2008 <5 (A) 0 - 10 mg/dl Final     Protein, Urine Random   Date Value Ref Range Status   10/19/2017 15 0 - 15 mg/dL Final     Comment:     The random urine reference ranges provided were established   for 24 hour urine collections.  No reference ranges exist for  random urine specimens.  Correlate clinically.     11/05/2016 12 0 - 15 mg/dL Final     Comment:     The random urine reference ranges provided were established   for 24 hour urine collections.  No reference ranges exist for  random urine specimens.  Correlate clinically.     06/22/2016 13 0 - 15 mg/dL Final     Comment:     The random urine reference ranges provided were established   for 24 hour urine collections.  No reference ranges exist  for  random urine specimens.  Correlate clinically.       Prot/Creat Ratio, Ur   Date Value Ref Range Status   10/19/2017 0.39 (H) 0.00 - 0.20 Final   11/05/2016 0.24 (H) 0.00 - 0.20 Final   06/22/2016 0.19 0.00 - 0.20 Final         ASSESSMENT AND PLAN:   88 y.o. female with history of CAD, CHF, HTN, pulmonary HTN, COPD, arthritis, breast CA presents to the renal clinic for evaluation of renal insufficiency.     1. Renal insufficiency: Patient's renal function has stabilized with creatinine at 1.3. This is likely related to good hydration and patient was advised to hydrate with 60-65 ounces of water per day. Protein creatinine ratio was 0.39. Will continue Losartan.  Patient's renal function will be monitored closely and she will return to the clinic in 4-5 months for follow up.     2. Electrolytes: at goal.     3. Acid base status: no issues.    4. Volume: Euvolemic.     5. Hypertension: good BP control.     6. Medications: Reviewed. Losartan was stopped because of hypotension. Will not restart Losartan at present given her comorbities and currently stable state.     7. Anemia: mild, monitor.

## 2017-11-07 ENCOUNTER — PATIENT MESSAGE (OUTPATIENT)
Dept: PULMONOLOGY | Facility: CLINIC | Age: 82
End: 2017-11-07

## 2017-11-07 RX ORDER — ALLOPURINOL 100 MG/1
TABLET ORAL
Qty: 180 TABLET | Refills: 1 | Status: SHIPPED | OUTPATIENT
Start: 2017-11-07 | End: 2018-04-17 | Stop reason: SDUPTHER

## 2017-11-09 DIAGNOSIS — J44.9 CHRONIC OBSTRUCTIVE PULMONARY DISEASE, UNSPECIFIED COPD TYPE: ICD-10-CM

## 2017-11-09 RX ORDER — IPRATROPIUM BROMIDE AND ALBUTEROL SULFATE 2.5; .5 MG/3ML; MG/3ML
SOLUTION RESPIRATORY (INHALATION)
Qty: 360 ML | Refills: 11 | Status: SHIPPED | OUTPATIENT
Start: 2017-11-09 | End: 2018-11-09 | Stop reason: SDUPTHER

## 2017-11-13 ENCOUNTER — PATIENT MESSAGE (OUTPATIENT)
Dept: INTERNAL MEDICINE | Facility: CLINIC | Age: 82
End: 2017-11-13

## 2017-11-13 DIAGNOSIS — R52 PAIN: ICD-10-CM

## 2017-11-13 RX ORDER — HYDROCODONE BITARTRATE AND ACETAMINOPHEN 5; 325 MG/1; MG/1
TABLET ORAL
Qty: 75 TABLET | Refills: 0 | Status: SHIPPED | OUTPATIENT
Start: 2017-11-13 | End: 2017-12-20 | Stop reason: SDUPTHER

## 2017-11-16 DIAGNOSIS — J44.9 CHRONIC OBSTRUCTIVE PULMONARY DISEASE, UNSPECIFIED COPD TYPE: ICD-10-CM

## 2017-11-16 RX ORDER — CLONAZEPAM 1 MG/1
TABLET ORAL
Qty: 45 TABLET | Refills: 0 | Status: SHIPPED | OUTPATIENT
Start: 2017-11-16 | End: 2018-03-16 | Stop reason: SDUPTHER

## 2017-11-16 RX ORDER — CITALOPRAM 40 MG/1
TABLET, FILM COATED ORAL
Qty: 136 TABLET | Refills: 0 | Status: SHIPPED | OUTPATIENT
Start: 2017-11-16 | End: 2018-03-30 | Stop reason: SDUPTHER

## 2017-11-16 RX ORDER — BUDESONIDE AND FORMOTEROL FUMARATE DIHYDRATE 80; 4.5 UG/1; UG/1
AEROSOL RESPIRATORY (INHALATION)
Qty: 10.2 G | Refills: 11 | Status: SHIPPED | OUTPATIENT
Start: 2017-11-16 | End: 2018-11-29 | Stop reason: SDUPTHER

## 2017-11-30 ENCOUNTER — OFFICE VISIT (OUTPATIENT)
Dept: OPHTHALMOLOGY | Facility: CLINIC | Age: 82
End: 2017-11-30
Payer: MEDICARE

## 2017-11-30 DIAGNOSIS — H40.1133 PRIMARY OPEN ANGLE GLAUCOMA OF BOTH EYES, SEVERE STAGE: Primary | ICD-10-CM

## 2017-11-30 DIAGNOSIS — H04.129 DRY EYE: ICD-10-CM

## 2017-11-30 DIAGNOSIS — Z96.1 PSEUDOPHAKIA OF BOTH EYES: ICD-10-CM

## 2017-11-30 PROCEDURE — 92250 FUNDUS PHOTOGRAPHY W/I&R: CPT | Mod: S$GLB,,, | Performed by: OPHTHALMOLOGY

## 2017-11-30 PROCEDURE — 99999 PR PBB SHADOW E&M-EST. PATIENT-LVL I: CPT | Mod: PBBFAC,,, | Performed by: OPHTHALMOLOGY

## 2017-11-30 PROCEDURE — 92083 EXTENDED VISUAL FIELD XM: CPT | Mod: S$GLB,,, | Performed by: OPHTHALMOLOGY

## 2017-11-30 PROCEDURE — 99499 UNLISTED E&M SERVICE: CPT | Mod: S$GLB,,, | Performed by: OPHTHALMOLOGY

## 2017-11-30 PROCEDURE — 92014 COMPRE OPH EXAM EST PT 1/>: CPT | Mod: S$GLB,,, | Performed by: OPHTHALMOLOGY

## 2017-11-30 RX ORDER — BRIMONIDINE TARTRATE 1 MG/ML
1 SOLUTION/ DROPS OPHTHALMIC 2 TIMES DAILY
Qty: 10 ML | Refills: 6 | Status: SHIPPED | OUTPATIENT
Start: 2017-11-30 | End: 2018-07-05

## 2017-11-30 NOTE — PROGRESS NOTES
SUBJECTIVE:   Anju Rivera is a 88 y.o. female   Corrected distance visual acuity was 20/30 +1 in the right eye and 20/25 +1 in the left eye.   Chief Complaint   Patient presents with    Glaucoma     4 mth hvf, dilation, sdp. pt states she sees moving patterns when she closes OD        HPI:  HPI     Glaucoma    Additional comments: 4 mth hvf, dilation, sdp. pt states she sees moving   patterns when she closes OD           Comments   Pt states she does not need new glasses    1. PCIOL OU  2. Severe Coag with enlarged cups (init 38/18) Goal < 16  3. PVD OD  4. H/O narrow angles  5. Blepharitis    Latanoprost qhs OU  Systane       Last edited by Summer Allen MA on 11/30/2017  1:35 PM. (History)        Assessment /Plan :  1. Primary open angle glaucoma of both eyes, severe stage Doing well, IOP within acceptable range relative to target IOP and no evidence of progression. Continue current treatment. Reviewed importance of continued compliance with treatment and follow up.      Will begin Brimonidine to help with optic nerve tissue health due to goct changes over past years   2. Dry eye    3. Pseudophakia of both eyes      Return to clinic in 3 months  or as needed.  With IOP Check and GOCT

## 2017-12-11 ENCOUNTER — PATIENT MESSAGE (OUTPATIENT)
Dept: INTERNAL MEDICINE | Facility: CLINIC | Age: 82
End: 2017-12-11

## 2017-12-14 ENCOUNTER — PATIENT OUTREACH (OUTPATIENT)
Dept: ADMINISTRATIVE | Facility: HOSPITAL | Age: 82
End: 2017-12-14

## 2017-12-20 ENCOUNTER — OFFICE VISIT (OUTPATIENT)
Dept: INTERNAL MEDICINE | Facility: CLINIC | Age: 82
End: 2017-12-20
Payer: MEDICARE

## 2017-12-20 VITALS
HEART RATE: 65 BPM | HEIGHT: 62 IN | TEMPERATURE: 97 F | WEIGHT: 83.56 LBS | BODY MASS INDEX: 15.38 KG/M2 | SYSTOLIC BLOOD PRESSURE: 196 MMHG | DIASTOLIC BLOOD PRESSURE: 98 MMHG

## 2017-12-20 DIAGNOSIS — R52 PAIN: ICD-10-CM

## 2017-12-20 DIAGNOSIS — I50.32 CHRONIC DIASTOLIC HEART FAILURE: Chronic | ICD-10-CM

## 2017-12-20 DIAGNOSIS — F41.9 ANXIETY: ICD-10-CM

## 2017-12-20 DIAGNOSIS — I25.10 CORONARY ARTERY DISEASE DUE TO LIPID RICH PLAQUE: Chronic | ICD-10-CM

## 2017-12-20 DIAGNOSIS — I25.83 CORONARY ARTERY DISEASE DUE TO LIPID RICH PLAQUE: Chronic | ICD-10-CM

## 2017-12-20 DIAGNOSIS — I10 ESSENTIAL HYPERTENSION: Primary | Chronic | ICD-10-CM

## 2017-12-20 PROCEDURE — 99999 PR PBB SHADOW E&M-EST. PATIENT-LVL III: CPT | Mod: PBBFAC,,, | Performed by: FAMILY MEDICINE

## 2017-12-20 PROCEDURE — 99499 UNLISTED E&M SERVICE: CPT | Mod: S$GLB,,, | Performed by: FAMILY MEDICINE

## 2017-12-20 PROCEDURE — 99214 OFFICE O/P EST MOD 30 MIN: CPT | Mod: S$GLB,,, | Performed by: FAMILY MEDICINE

## 2017-12-20 RX ORDER — CLONAZEPAM 1 MG/1
1.5 TABLET ORAL DAILY
Qty: 45 TABLET | Refills: 0 | Status: SHIPPED | OUTPATIENT
Start: 2017-12-20 | End: 2018-01-17 | Stop reason: SDUPTHER

## 2017-12-20 RX ORDER — LOSARTAN POTASSIUM 50 MG/1
50 TABLET ORAL DAILY
Qty: 90 TABLET | Refills: 0 | Status: SHIPPED | OUTPATIENT
Start: 2017-12-20 | End: 2018-02-08 | Stop reason: SDUPTHER

## 2017-12-20 RX ORDER — HYDROCODONE BITARTRATE AND ACETAMINOPHEN 5; 325 MG/1; MG/1
TABLET ORAL
Qty: 75 TABLET | Refills: 0 | Status: SHIPPED | OUTPATIENT
Start: 2017-12-20 | End: 2018-01-17 | Stop reason: SDUPTHER

## 2017-12-20 NOTE — PROGRESS NOTES
Subjective:       Patient ID: Anju Rivera is a 88 y.o. female.    Chief Complaint: Follow-up and Medication Refill    HTN:       Pt is a 88 year old who we stopped BP medication due to low pressures now presenting with elevated pressure in the 196. Pt was having such low pressure that       Medication Refill   This is a chronic problem. The problem has been unchanged. Pertinent negatives include no change in bowel habit, chest pain, myalgias, visual change or vomiting. Nothing aggravates the symptoms.     Review of Systems   Constitutional: Negative.    Respiratory: Negative.    Cardiovascular: Negative for chest pain.   Gastrointestinal: Negative for change in bowel habit and vomiting.   Genitourinary: Negative.    Musculoskeletal: Negative for myalgias.   Neurological: Negative.        Objective:      Physical Exam   Constitutional: She is oriented to person, place, and time. She appears well-developed and well-nourished.   Cardiovascular: Normal rate and regular rhythm.    Pulmonary/Chest: Effort normal and breath sounds normal.   Abdominal: Soft. Bowel sounds are normal.   Neurological: She is alert and oriented to person, place, and time.   Skin: Skin is warm and dry.   Psychiatric: She has a normal mood and affect. Her behavior is normal.       Assessment:       1. Essential hypertension    2. Chronic diastolic heart failure    3. Coronary artery disease due to lipid rich plaque    4. Pain    5. Anxiety        Plan:       Essential hypertension  Comments:  Will go back on the losartan 50 mg. Re-evaluate    Chronic diastolic heart failure  Comments:  Stable at this point    Coronary artery disease due to lipid rich plaque  Comments:  Stable at this point    Pain  Comments:  Will continue with the norco.  Orders:  -     hydrocodone-acetaminophen 5-325mg (NORCO) 5-325 mg per tablet; Take 1/2 to 1 tablet one time during the the day as needed and 1 1/2 tablet at night  Dispense: 75 tablet; Refill:  0    Anxiety  Comments:  Will do klonopin    Other orders  -     Cancel: Lipid panel; Future  -     losartan (COZAAR) 50 MG tablet; Take 1 tablet (50 mg total) by mouth once daily.  Dispense: 90 tablet; Refill: 0  -     clonazePAM (KLONOPIN) 1 MG tablet; Take 1.5 tablets (1.5 mg total) by mouth once daily.  Dispense: 45 tablet; Refill: 0

## 2017-12-22 ENCOUNTER — PATIENT MESSAGE (OUTPATIENT)
Dept: INTERNAL MEDICINE | Facility: CLINIC | Age: 82
End: 2017-12-22

## 2017-12-26 ENCOUNTER — PATIENT MESSAGE (OUTPATIENT)
Dept: INTERNAL MEDICINE | Facility: CLINIC | Age: 82
End: 2017-12-26

## 2018-01-02 ENCOUNTER — OFFICE VISIT (OUTPATIENT)
Dept: URGENT CARE | Facility: CLINIC | Age: 83
End: 2018-01-02
Payer: MEDICARE

## 2018-01-02 ENCOUNTER — PATIENT MESSAGE (OUTPATIENT)
Dept: INTERNAL MEDICINE | Facility: CLINIC | Age: 83
End: 2018-01-02

## 2018-01-02 VITALS
SYSTOLIC BLOOD PRESSURE: 124 MMHG | WEIGHT: 84.19 LBS | BODY MASS INDEX: 15.49 KG/M2 | TEMPERATURE: 98 F | HEIGHT: 62 IN | DIASTOLIC BLOOD PRESSURE: 72 MMHG

## 2018-01-02 DIAGNOSIS — Z48.02 ENCOUNTER FOR REMOVAL OF SUTURES: Primary | ICD-10-CM

## 2018-01-02 PROCEDURE — 99999 PR PBB SHADOW E&M-EST. PATIENT-LVL III: CPT | Mod: PBBFAC,,, | Performed by: FAMILY MEDICINE

## 2018-01-02 PROCEDURE — 99213 OFFICE O/P EST LOW 20 MIN: CPT | Mod: S$GLB,,, | Performed by: FAMILY MEDICINE

## 2018-01-02 NOTE — PROGRESS NOTES
"Subjective:       Patient ID: Anju Rivera is a 88 y.o. female.    Chief Complaint: No chief complaint on file.    /72 (BP Location: Left arm, Patient Position: Sitting, BP Method: Small (Manual))   Temp 98.2 °F (36.8 °C) (Tympanic)   Ht 5' 2" (1.575 m)   Wt 38.2 kg (84 lb 3.5 oz)   BMI 15.40 kg/m²     HPI   Laceration on forehead 5 days ago, treated in [facility], need suture removal    Review of Systems   Skin: Positive for wound.   Neurological: Positive for weakness.       Objective:      Physical Exam   Skin:   Forehead wound: clean, dry, well approximated. 6 stitches removed. Sterile strips applied       Assessment:       1. Encounter for removal of sutures        Plan:     Diagnoses and all orders for this visit:    Encounter for removal of sutures              Discussed with pt/family all information and results pertaining to this visit. Discussed diagnosis and plan of treatment.  All questions and concerns were addressed at this time. Pt/family expresses understanding of information and instructions.  Care and follow up instruction provided.  "

## 2018-01-08 ENCOUNTER — OFFICE VISIT (OUTPATIENT)
Dept: PULMONOLOGY | Facility: CLINIC | Age: 83
End: 2018-01-08
Payer: MEDICARE

## 2018-01-08 ENCOUNTER — HOSPITAL ENCOUNTER (OUTPATIENT)
Dept: RADIOLOGY | Facility: HOSPITAL | Age: 83
Discharge: HOME | End: 2018-01-08
Attending: NURSE PRACTITIONER
Payer: MEDICARE

## 2018-01-08 ENCOUNTER — PATIENT MESSAGE (OUTPATIENT)
Dept: INTERNAL MEDICINE | Facility: CLINIC | Age: 83
End: 2018-01-08

## 2018-01-08 VITALS
OXYGEN SATURATION: 91 % | HEIGHT: 62 IN | RESPIRATION RATE: 16 BRPM | HEART RATE: 70 BPM | BODY MASS INDEX: 15.58 KG/M2 | SYSTOLIC BLOOD PRESSURE: 140 MMHG | DIASTOLIC BLOOD PRESSURE: 90 MMHG | WEIGHT: 84.69 LBS

## 2018-01-08 DIAGNOSIS — J44.9 CHRONIC OBSTRUCTIVE PULMONARY DISEASE, UNSPECIFIED COPD TYPE: ICD-10-CM

## 2018-01-08 DIAGNOSIS — J44.9 CHRONIC OBSTRUCTIVE PULMONARY DISEASE, UNSPECIFIED COPD TYPE: Primary | ICD-10-CM

## 2018-01-08 DIAGNOSIS — J44.1 COPD EXACERBATION: ICD-10-CM

## 2018-01-08 PROCEDURE — 99214 OFFICE O/P EST MOD 30 MIN: CPT | Mod: S$GLB,,, | Performed by: NURSE PRACTITIONER

## 2018-01-08 PROCEDURE — 71046 X-RAY EXAM CHEST 2 VIEWS: CPT | Mod: 26,,, | Performed by: RADIOLOGY

## 2018-01-08 PROCEDURE — 71046 X-RAY EXAM CHEST 2 VIEWS: CPT | Mod: TC,FY,PO

## 2018-01-08 PROCEDURE — 99999 PR PBB SHADOW E&M-EST. PATIENT-LVL III: CPT | Mod: PBBFAC,,, | Performed by: NURSE PRACTITIONER

## 2018-01-08 PROCEDURE — 99499 UNLISTED E&M SERVICE: CPT | Mod: S$GLB,,, | Performed by: NURSE PRACTITIONER

## 2018-01-08 RX ORDER — DOXYCYCLINE 100 MG/1
100 CAPSULE ORAL 2 TIMES DAILY
Qty: 20 CAPSULE | Refills: 0 | Status: SHIPPED | OUTPATIENT
Start: 2018-01-08 | End: 2018-03-01

## 2018-01-08 NOTE — PROGRESS NOTES
"Subjective:      Patient ID: Anju Rivera is a 88 y.o. female.    Chief Complaint: COPD    Patient was COPD presents to the office today for evaluation.  She has an increased cough and congestion.  She is compliant with Symbicort.  She is taking DuoNeb once a day.  Mucinex twice a day.  She states Mucinex does help.  No fever.    She recently had another fall.  She fell from her chair at the beauty salon.     Patient Active Problem List:     Coronary artery disease     Chronic diastolic heart failure     Mitral regurgitation     COPD (chronic obstructive pulmonary disease)     Hypertension     Aortic valve disorder     Pulmonary hypertension     Sinus bradycardia     Mixed hyperlipidemia     COAG (chronic open-angle glaucoma) - Both Eyes     HX of MI     Hypothyroidism     Peripheral vascular disease     Amputated great toe     Anxiety     Shortness of breath     PVC's (premature ventricular contractions)     Proteinuria     Chronic kidney disease, stage III (moderate)     History of breast cancer     History of skin cancer     Major depression     Carotid artery disease     Ectatic aorta     Primary open angle glaucoma of both eyes, severe stage     Pseudophakia of both eyes     Refractive error     Right shoulder pain     Closed left arm fracture     Paronychia of finger of right hand     Bone disorder            BP (!) 140/90   Pulse 70   Resp 16   Ht 5' 2" (1.575 m)   Wt 38.4 kg (84 lb 10.5 oz)   SpO2 (!) 91%   BMI 15.48 kg/m²   Body mass index is 15.48 kg/m².    Review of Systems   Constitutional: Positive for weakness.   HENT: Negative.    Respiratory: Positive for cough and sputum production.    Musculoskeletal: Positive for arthralgias, back pain and gait problem.   Neurological: Positive for weakness.     Objective:      Physical Exam   Constitutional: She is oriented to person, place, and time.   Thin, frail.     HENT:   Head: Normocephalic and atraumatic.   Neck: Normal range of motion. "   Cardiovascular: Normal rate and regular rhythm.    Pulmonary/Chest: Effort normal.   Decreased BS.    Musculoskeletal:   In wheelchair   Neurological: She is alert and oriented to person, place, and time.         Results for orders placed during the hospital encounter of 01/08/18   X-Ray Chest PA And Lateral    Narrative Chest two views.  Comparison 12/06/2016.    Findings: Heart size and mediastinal contour stable.  Large hiatal hernia noted.  Lungs are hyperexpanded with diffuse coarsening of the bronchovascular markings in keeping with COPD.  No confluent infiltrate or effusion.  Multilevel thoracic spondylosis, old rib fractures, and advanced degenerative change right shoulder.  Surgical clips right axilla.    Impression  No acute disease.      Electronically signed by: LEANDRA MARRERO MD  Date:     01/08/18  Time:    11:26          Assessment:       1. Chronic obstructive pulmonary disease, unspecified COPD type    2. COPD exacerbation        Outpatient Encounter Prescriptions as of 1/8/2018   Medication Sig Dispense Refill    albuterol 90 mcg/actuation inhaler Inhale 2 puffs into the lungs every 4 (four) hours as needed for Wheezing. 1 Inhaler 2    albuterol-ipratropium 2.5mg-0.5mg/3mL (DUO-NEB) 0.5 mg-3 mg(2.5 mg base)/3 mL nebulizer solution USE 1 VIAL VIA NEBULIZER EVERY 6 TO 8 HOURS AS NEEDED FOR WHEEZING 360 mL 11    allopurinol (ZYLOPRIM) 100 MG tablet TAKE 2 TABLETS BY MOUTH ONCE DAILY 60 tablet 0    allopurinol (ZYLOPRIM) 100 MG tablet TAKE 2 TABLETS BY MOUTH ONCE DAILY 180 tablet 1    aspirin (ECOTRIN) 81 MG EC tablet Take 81 mg by mouth once daily.      brimonidine 0.1% (ALPHAGAN P) 0.1 % Drop Place 1 drop into both eyes 2 (two) times daily. 10 mL 6    CARBOXYMETHYLCELLULOSE SODIUM (REFRESH OPHT) Apply to eye.      citalopram (CELEXA) 40 MG tablet TAKE 1 TABLET BY MOUTH DAILY 136 tablet 0    clonazePAM (KLONOPIN) 1 MG tablet TAKE 1 AND 1/2 TABLETS BY MOUTH ONCE DAILY 45 tablet 0     clonazePAM (KLONOPIN) 1 MG tablet Take 1.5 tablets (1.5 mg total) by mouth once daily. 45 tablet 0    cloNIDine (CATAPRES) 0.1 MG tablet TAKE 1 TABLET BY MOUTH ONCE DAILY 90 tablet 2    hydrocodone-acetaminophen 5-325mg (NORCO) 5-325 mg per tablet Take 1/2 to 1 tablet one time during the the day as needed and 1 1/2 tablet at night 75 tablet 0    latanoprost 0.005 % ophthalmic solution INSTILL 1 DROP IN EACH EYE EVERY EVENING 2.5 mL 12    levothyroxine (SYNTHROID) 50 MCG tablet Take 1 tablet (50 mcg total) by mouth once daily. 90 tablet 3    losartan (COZAAR) 50 MG tablet Take 1 tablet (50 mg total) by mouth once daily. 90 tablet 0    MULTIVITAMIN Take 1 tablet by mouth Daily.      mupirocin (BACTROBAN) 2 % ointment Apply topically 3 (three) times daily. 30 g 0    SYMBICORT 80-4.5 mcg/actuation HFAA INHALE 2 PUFFS INTO THE LUNGS TWICE DAILY. RINSE MOUTH AFTER USE 10.2 g 11    doxycycline (MONODOX) 100 MG capsule Take 1 capsule (100 mg total) by mouth 2 (two) times daily. 20 capsule 0     No facility-administered encounter medications on file as of 1/8/2018.      No orders of the defined types were placed in this encounter.    Plan:      Doxycycline.  Mucinex twice a day.  Increase neb treatments 4 times a day. Call if symptoms do not resolve.  Continue current pulmonary regimen.

## 2018-01-12 ENCOUNTER — PATIENT MESSAGE (OUTPATIENT)
Dept: INTERNAL MEDICINE | Facility: CLINIC | Age: 83
End: 2018-01-12

## 2018-01-17 ENCOUNTER — PATIENT MESSAGE (OUTPATIENT)
Dept: INTERNAL MEDICINE | Facility: CLINIC | Age: 83
End: 2018-01-17

## 2018-01-17 DIAGNOSIS — R52 PAIN: ICD-10-CM

## 2018-01-17 RX ORDER — CLONAZEPAM 1 MG/1
TABLET ORAL
Qty: 45 TABLET | Refills: 0 | Status: SHIPPED | OUTPATIENT
Start: 2018-01-17 | End: 2018-02-15 | Stop reason: SDUPTHER

## 2018-01-17 RX ORDER — HYDROCODONE BITARTRATE AND ACETAMINOPHEN 5; 325 MG/1; MG/1
TABLET ORAL
Qty: 75 TABLET | Refills: 0 | Status: SHIPPED | OUTPATIENT
Start: 2018-01-17 | End: 2018-02-15 | Stop reason: SDUPTHER

## 2018-02-06 ENCOUNTER — PATIENT OUTREACH (OUTPATIENT)
Dept: ADMINISTRATIVE | Facility: HOSPITAL | Age: 83
End: 2018-02-06

## 2018-02-06 NOTE — PROGRESS NOTES
Medication reconciliation completed 4/7/2017 for DOS 3/31/2017 and has been routed to [employer] as attestation documentation for MED REC D/C

## 2018-02-08 RX ORDER — LOSARTAN POTASSIUM 50 MG/1
TABLET ORAL
Qty: 90 TABLET | Refills: 1 | Status: SHIPPED | OUTPATIENT
Start: 2018-02-08 | End: 2018-04-17 | Stop reason: SDUPTHER

## 2018-02-15 ENCOUNTER — PATIENT MESSAGE (OUTPATIENT)
Dept: INTERNAL MEDICINE | Facility: CLINIC | Age: 83
End: 2018-02-15

## 2018-02-15 DIAGNOSIS — R52 PAIN: ICD-10-CM

## 2018-02-16 ENCOUNTER — PATIENT MESSAGE (OUTPATIENT)
Dept: INTERNAL MEDICINE | Facility: CLINIC | Age: 83
End: 2018-02-16

## 2018-02-16 RX ORDER — CLONIDINE HYDROCHLORIDE 0.1 MG/1
0.1 TABLET ORAL 2 TIMES DAILY
Qty: 180 TABLET | Refills: 0 | Status: SHIPPED | OUTPATIENT
Start: 2018-02-16 | End: 2018-07-05

## 2018-02-17 ENCOUNTER — PATIENT MESSAGE (OUTPATIENT)
Dept: INTERNAL MEDICINE | Facility: CLINIC | Age: 83
End: 2018-02-17

## 2018-02-19 RX ORDER — CLONAZEPAM 1 MG/1
TABLET ORAL
Qty: 45 TABLET | Refills: 0 | Status: SHIPPED | OUTPATIENT
Start: 2018-02-19 | End: 2018-04-17 | Stop reason: SDUPTHER

## 2018-02-19 RX ORDER — HYDROCODONE BITARTRATE AND ACETAMINOPHEN 5; 325 MG/1; MG/1
TABLET ORAL
Qty: 75 TABLET | Refills: 0 | Status: SHIPPED | OUTPATIENT
Start: 2018-02-19 | End: 2018-03-16 | Stop reason: SDUPTHER

## 2018-02-28 RX ORDER — CLONAZEPAM 1 MG/1
1.5 TABLET ORAL DAILY
Qty: 45 TABLET | Refills: 0 | OUTPATIENT
Start: 2018-02-28

## 2018-03-01 ENCOUNTER — OFFICE VISIT (OUTPATIENT)
Dept: OPHTHALMOLOGY | Facility: CLINIC | Age: 83
End: 2018-03-01
Payer: MEDICARE

## 2018-03-01 DIAGNOSIS — Z96.1 PSEUDOPHAKIA OF BOTH EYES: ICD-10-CM

## 2018-03-01 DIAGNOSIS — H40.1133 PRIMARY OPEN ANGLE GLAUCOMA OF BOTH EYES, SEVERE STAGE: Primary | ICD-10-CM

## 2018-03-01 DIAGNOSIS — H04.129 DRY EYE: ICD-10-CM

## 2018-03-01 PROCEDURE — 99999 PR PBB SHADOW E&M-EST. PATIENT-LVL II: CPT | Mod: PBBFAC,,, | Performed by: OPHTHALMOLOGY

## 2018-03-01 PROCEDURE — 92133 CPTRZD OPH DX IMG PST SGM ON: CPT | Mod: S$GLB,,, | Performed by: OPHTHALMOLOGY

## 2018-03-01 PROCEDURE — 92012 INTRM OPH EXAM EST PATIENT: CPT | Mod: S$GLB,,, | Performed by: OPHTHALMOLOGY

## 2018-03-01 PROCEDURE — 99499 UNLISTED E&M SERVICE: CPT | Mod: S$GLB,,, | Performed by: OPHTHALMOLOGY

## 2018-03-01 NOTE — PROGRESS NOTES
SUBJECTIVE:   Anju Rivera is a 88 y.o. female   Uncorrected distance visual acuity was 20/40 in the right eye and 20/25 in the left eye.   Chief Complaint   Patient presents with    Pseudophakia        HPI:  HPI     3 month IOP check, GOCT review.    1. PCIOL OU  2. Severe Coag with enlarged cups (init 38/18) Goal < 16  Vf progression OD 11/17 add Brimonidine  3. PVD OD  4. H/O narrow angles  5. Blepharitis  6 YUNG OU      Latanoprost qhs OU  Brimonidine bid OU (has been using 1 qd)  Lubricant drops uses intermittently  Systane    Last edited by Tracy Frazier on 3/1/2018 11:23 AM. (History)        Assessment /Plan :  1. Primary open angle glaucoma of both eyes, severe stage Doing well, IOP within acceptable range relative to target IOP and no evidence of progression. Continue current treatment. Reviewed importance of continued compliance with treatment and follow up.     2. Pseudophakia of both eyes  -- Condition stable, no therapeutic change required. Monitoring routinely.     3. Dry eye  -- Condition stable, no therapeutic change required. Monitoring routinely.       Return to clinic in 4 months  or as needed.  With IOP Check

## 2018-03-05 ENCOUNTER — PATIENT MESSAGE (OUTPATIENT)
Dept: PULMONOLOGY | Facility: CLINIC | Age: 83
End: 2018-03-05

## 2018-03-05 RX ORDER — ALBUTEROL SULFATE 90 UG/1
2 AEROSOL, METERED RESPIRATORY (INHALATION) EVERY 4 HOURS PRN
Qty: 18 G | Refills: 3 | Status: SHIPPED | OUTPATIENT
Start: 2018-03-05 | End: 2019-08-02 | Stop reason: SDUPTHER

## 2018-03-12 ENCOUNTER — PATIENT MESSAGE (OUTPATIENT)
Dept: OPHTHALMOLOGY | Facility: CLINIC | Age: 83
End: 2018-03-12

## 2018-03-16 ENCOUNTER — PATIENT MESSAGE (OUTPATIENT)
Dept: INTERNAL MEDICINE | Facility: CLINIC | Age: 83
End: 2018-03-16

## 2018-03-16 DIAGNOSIS — R52 PAIN: ICD-10-CM

## 2018-03-16 RX ORDER — HYDROCODONE BITARTRATE AND ACETAMINOPHEN 5; 325 MG/1; MG/1
TABLET ORAL
Qty: 75 TABLET | Refills: 0 | Status: CANCELLED | OUTPATIENT
Start: 2018-03-16

## 2018-03-16 RX ORDER — CLONAZEPAM 1 MG/1
1.5 TABLET ORAL DAILY
Qty: 45 TABLET | Refills: 0 | Status: CANCELLED | OUTPATIENT
Start: 2018-03-16

## 2018-03-16 RX ORDER — CLONAZEPAM 1 MG/1
1.5 TABLET ORAL DAILY
Qty: 45 TABLET | Refills: 0 | Status: SHIPPED | OUTPATIENT
Start: 2018-03-16 | End: 2018-04-17 | Stop reason: SDUPTHER

## 2018-03-16 RX ORDER — HYDROCODONE BITARTRATE AND ACETAMINOPHEN 5; 325 MG/1; MG/1
TABLET ORAL
Qty: 75 TABLET | Refills: 0 | Status: SHIPPED | OUTPATIENT
Start: 2018-03-16 | End: 2018-04-17 | Stop reason: SDUPTHER

## 2018-03-29 NOTE — TELEPHONE ENCOUNTER
Patient coming in for PPD placement. Please place order  
Alert and oriented, no focal deficits, no motor or sensory deficits.

## 2018-04-01 RX ORDER — CITALOPRAM 40 MG/1
TABLET, FILM COATED ORAL
Qty: 136 TABLET | Refills: 2 | Status: SHIPPED | OUTPATIENT
Start: 2018-04-01 | End: 2019-04-26 | Stop reason: SDUPTHER

## 2018-04-03 ENCOUNTER — PATIENT OUTREACH (OUTPATIENT)
Dept: ADMINISTRATIVE | Facility: HOSPITAL | Age: 83
End: 2018-04-03

## 2018-04-03 DIAGNOSIS — I10 ESSENTIAL HYPERTENSION: Chronic | ICD-10-CM

## 2018-04-03 DIAGNOSIS — E78.2 MIXED HYPERLIPIDEMIA: Primary | Chronic | ICD-10-CM

## 2018-04-03 DIAGNOSIS — E03.9 HYPOTHYROIDISM, UNSPECIFIED TYPE: ICD-10-CM

## 2018-04-03 NOTE — PROGRESS NOTES
Lipid Panel and TSH overdue, ordered and linked to labs. Attempt to call pt and informed to come in fasting.  No answer. Left message for pt to return my call.

## 2018-04-03 NOTE — PROGRESS NOTES
Pt's daughter Violetta Mao returned my call. Verbalized pt will come in fasting. Will bring pt something for pt to eat after labs to avoid feeling bad.

## 2018-04-05 DIAGNOSIS — H40.1133 PRIMARY OPEN ANGLE GLAUCOMA OF BOTH EYES, SEVERE STAGE: ICD-10-CM

## 2018-04-06 RX ORDER — LATANOPROST 50 UG/ML
SOLUTION/ DROPS OPHTHALMIC
Qty: 2.5 ML | Refills: 6 | Status: SHIPPED | OUTPATIENT
Start: 2018-04-06 | End: 2018-12-10 | Stop reason: SDUPTHER

## 2018-04-17 ENCOUNTER — OFFICE VISIT (OUTPATIENT)
Dept: INTERNAL MEDICINE | Facility: CLINIC | Age: 83
End: 2018-04-17
Payer: MEDICARE

## 2018-04-17 ENCOUNTER — LAB VISIT (OUTPATIENT)
Dept: LAB | Facility: HOSPITAL | Age: 83
End: 2018-04-17
Attending: INTERNAL MEDICINE
Payer: MEDICARE

## 2018-04-17 VITALS
SYSTOLIC BLOOD PRESSURE: 138 MMHG | WEIGHT: 82.44 LBS | HEART RATE: 79 BPM | HEIGHT: 63 IN | DIASTOLIC BLOOD PRESSURE: 70 MMHG | TEMPERATURE: 99 F | BODY MASS INDEX: 14.61 KG/M2

## 2018-04-17 DIAGNOSIS — R80.9 PROTEINURIA, UNSPECIFIED TYPE: ICD-10-CM

## 2018-04-17 DIAGNOSIS — M79.672 BILATERAL FOOT PAIN: ICD-10-CM

## 2018-04-17 DIAGNOSIS — M79.671 BILATERAL FOOT PAIN: ICD-10-CM

## 2018-04-17 DIAGNOSIS — I73.9 PERIPHERAL VASCULAR DISEASE: ICD-10-CM

## 2018-04-17 DIAGNOSIS — I10 ESSENTIAL HYPERTENSION: Primary | Chronic | ICD-10-CM

## 2018-04-17 DIAGNOSIS — I50.32 CHRONIC DIASTOLIC HEART FAILURE: Chronic | ICD-10-CM

## 2018-04-17 DIAGNOSIS — I77.9 BILATERAL CAROTID ARTERY DISEASE: ICD-10-CM

## 2018-04-17 DIAGNOSIS — N18.30 CKD (CHRONIC KIDNEY DISEASE) STAGE 3, GFR 30-59 ML/MIN: ICD-10-CM

## 2018-04-17 DIAGNOSIS — R52 PAIN: ICD-10-CM

## 2018-04-17 DIAGNOSIS — F32.5 MAJOR DEPRESSIVE DISORDER WITH SINGLE EPISODE, IN REMISSION: ICD-10-CM

## 2018-04-17 DIAGNOSIS — S98.112A AMPUTATED GREAT TOE OF LEFT FOOT: ICD-10-CM

## 2018-04-17 LAB
BILIRUB UR QL STRIP: NEGATIVE
CLARITY UR: CLEAR
COLOR UR: ABNORMAL
CREAT UR-MCNC: 23 MG/DL
GLUCOSE UR QL STRIP: NEGATIVE
HGB UR QL STRIP: NEGATIVE
KETONES UR QL STRIP: NEGATIVE
LEUKOCYTE ESTERASE UR QL STRIP: NEGATIVE
NITRITE UR QL STRIP: NEGATIVE
PH UR STRIP: 6 [PH] (ref 5–8)
PROT UR QL STRIP: NEGATIVE
PROT UR-MCNC: 9 MG/DL
PROT/CREAT RATIO, UR: 0.39
SP GR UR STRIP: <=1.005 (ref 1–1.03)
URN SPEC COLLECT METH UR: ABNORMAL

## 2018-04-17 PROCEDURE — 99999 PR PBB SHADOW E&M-EST. PATIENT-LVL III: CPT | Mod: PBBFAC,,, | Performed by: FAMILY MEDICINE

## 2018-04-17 PROCEDURE — 99499 UNLISTED E&M SERVICE: CPT | Mod: S$GLB,,, | Performed by: FAMILY MEDICINE

## 2018-04-17 PROCEDURE — 82570 ASSAY OF URINE CREATININE: CPT

## 2018-04-17 PROCEDURE — 81003 URINALYSIS AUTO W/O SCOPE: CPT | Mod: PO

## 2018-04-17 PROCEDURE — 99214 OFFICE O/P EST MOD 30 MIN: CPT | Mod: S$GLB,,, | Performed by: FAMILY MEDICINE

## 2018-04-17 RX ORDER — LOSARTAN POTASSIUM 50 MG/1
TABLET ORAL
Qty: 90 TABLET | Refills: 1 | Status: SHIPPED | OUTPATIENT
Start: 2018-04-17 | End: 2019-07-07 | Stop reason: SDUPTHER

## 2018-04-17 RX ORDER — HYDROCODONE BITARTRATE AND ACETAMINOPHEN 5; 325 MG/1; MG/1
TABLET ORAL
Qty: 75 TABLET | Refills: 0 | Status: SHIPPED | OUTPATIENT
Start: 2018-04-17 | End: 2018-07-16 | Stop reason: SDUPTHER

## 2018-04-17 RX ORDER — CLONAZEPAM 1 MG/1
TABLET ORAL
Qty: 45 TABLET | Refills: 0 | Status: SHIPPED | OUTPATIENT
Start: 2018-04-17 | End: 2018-05-17 | Stop reason: SDUPTHER

## 2018-04-17 NOTE — PROGRESS NOTES
Subjective:       Patient ID: Anju Rivera is a 88 y.o. female.    Chief Complaint: Follow-up    F/U:       Pt is a 88 year old who is here for medication management.       Review of Systems   Constitutional: Negative.    Respiratory: Negative.    Cardiovascular: Negative.    Genitourinary: Negative.    Neurological: Negative.    Hematological: Negative.        Objective:      Physical Exam   Constitutional: She appears well-developed and well-nourished.   Cardiovascular: Normal rate and regular rhythm.    Pulses:       Dorsalis pedis pulses are 0 on the left side.        Posterior tibial pulses are 0 on the left side.   Pulmonary/Chest: Effort normal and breath sounds normal.   Abdominal: Soft. Bowel sounds are normal.   Musculoskeletal: Normal range of motion.   Feet:   Right Foot:   Skin Integrity: Positive for erythema (toe).   Left Foot:   Skin Integrity: Left foot erythema: toe.   Skin: Skin is warm and dry.       Assessment:       1. Essential hypertension    2. Chronic diastolic heart failure    3. Major depressive disorder with single episode, in remission    4. Peripheral vascular disease    5. Bilateral carotid artery disease    6. Amputated great toe of left foot    7. Bilateral foot pain        Plan:       Essential hypertension  Comments:  BP is well controlled today    Chronic diastolic heart failure  Comments:  Pt is stable     Major depressive disorder with single episode, in remission  Comments:  Pt is stable    Peripheral vascular disease  Comments:  Very red toe. Will send to podiatry as pt is having some foot pain and poor circulation 0-1 plus pulses. In line with PVD.  Orders:  -     US Lower Extremity Arteries Bilateral; Future; Expected date: 04/17/2018  -     Ambulatory referral to Podiatry    Bilateral carotid artery disease    Amputated great toe of left foot    Bilateral foot pain  Comments:  Will send to Podiatry for nail care  Orders:  -     US Lower Extremity Arteries Bilateral;  Future; Expected date: 04/17/2018  -     Ambulatory referral to Podiatry

## 2018-04-18 ENCOUNTER — TELEPHONE (OUTPATIENT)
Dept: RADIOLOGY | Facility: HOSPITAL | Age: 83
End: 2018-04-18

## 2018-04-19 ENCOUNTER — HOSPITAL ENCOUNTER (OUTPATIENT)
Dept: RADIOLOGY | Facility: HOSPITAL | Age: 83
Discharge: HOME | End: 2018-04-19
Attending: FAMILY MEDICINE
Payer: MEDICARE

## 2018-04-19 DIAGNOSIS — I73.9 PERIPHERAL VASCULAR DISEASE: ICD-10-CM

## 2018-04-19 DIAGNOSIS — M79.671 BILATERAL FOOT PAIN: ICD-10-CM

## 2018-04-19 DIAGNOSIS — M79.672 BILATERAL FOOT PAIN: ICD-10-CM

## 2018-04-19 PROCEDURE — 93925 LOWER EXTREMITY STUDY: CPT | Mod: 26,,, | Performed by: RADIOLOGY

## 2018-04-19 PROCEDURE — 93925 LOWER EXTREMITY STUDY: CPT | Mod: TC,PO

## 2018-04-24 ENCOUNTER — OFFICE VISIT (OUTPATIENT)
Dept: NEPHROLOGY | Facility: CLINIC | Age: 83
End: 2018-04-24
Payer: MEDICARE

## 2018-04-24 ENCOUNTER — PATIENT MESSAGE (OUTPATIENT)
Dept: PODIATRY | Facility: CLINIC | Age: 83
End: 2018-04-24

## 2018-04-24 VITALS
HEART RATE: 64 BPM | WEIGHT: 83.75 LBS | BODY MASS INDEX: 14.84 KG/M2 | DIASTOLIC BLOOD PRESSURE: 78 MMHG | HEIGHT: 63 IN | SYSTOLIC BLOOD PRESSURE: 138 MMHG

## 2018-04-24 DIAGNOSIS — R80.9 PROTEINURIA, UNSPECIFIED TYPE: ICD-10-CM

## 2018-04-24 DIAGNOSIS — N18.30 CKD (CHRONIC KIDNEY DISEASE) STAGE 3, GFR 30-59 ML/MIN: Primary | ICD-10-CM

## 2018-04-24 PROCEDURE — 99499 UNLISTED E&M SERVICE: CPT | Mod: S$GLB,,, | Performed by: INTERNAL MEDICINE

## 2018-04-24 PROCEDURE — 99999 PR PBB SHADOW E&M-EST. PATIENT-LVL III: CPT | Mod: PBBFAC,,, | Performed by: INTERNAL MEDICINE

## 2018-04-24 PROCEDURE — 99214 OFFICE O/P EST MOD 30 MIN: CPT | Mod: S$GLB,,, | Performed by: INTERNAL MEDICINE

## 2018-04-24 NOTE — PROGRESS NOTES
PROGRESS NOTE FOR ESTABLISHED PATIENT    PHYSICIAN REQUESTING THE CONSULT: Dr. Gallardo     REASON FOR VISIT: Renal insufficiency     88 y.o. female with history of CAD, CHF, HTN, pulmonary HTN, COPD, arthritis, breast CA presents to the renal clinic for evaluation of renal insufficiency.     Patient reports intermittent hand cramping.       Past Medical History    Diagnosis  Date      Coronary artery disease  6/17/2013      Chronic diastolic heart failure  6/17/2013      Mitral regurgitation  6/17/2013      Hypertension  6/17/2013      Aortic valve disorder  6/17/2013      Pulmonary hypertension  6/17/2013      Sinus bradycardia  6/18/2013      COPD (chronic obstructive pulmonary disease)  6/17/2013      Arthritis       Emphysema of lung       copd      Thyroid disease       hypothyroidism      Pneumonia       dx 02/2012      MI (myocardial infarction)  05/1981      Breast cancer  1981      Glaucoma       Cataract       Skin cancer       Past Surgical History    Procedure  Date      Masectomy  1996      right breast      Finger amputation       digits 4, 5      Toe amputation       left foot great hallux      Wrist arthroplasty       left wrist      Orif hip fracture  11/2012      Eye surgery       right cataract removal. lens replacement      Cataract extraction       Allergies    Allergen  Reactions      Atorvastatin  Other (See Comments)      Dry mouth      Lactose  Diarrhea      Current Outpatient Prescriptions   Medication Sig Dispense Refill    albuterol (VENTOLIN HFA) 90 mcg/actuation inhaler Inhale 2 puffs into the lungs every 4 (four) hours as needed for Wheezing. 18 g 3    albuterol-ipratropium 2.5mg-0.5mg/3mL (DUO-NEB) 0.5 mg-3 mg(2.5 mg base)/3 mL nebulizer solution USE 1 VIAL VIA NEBULIZER EVERY 6 TO 8 HOURS AS NEEDED FOR WHEEZING 360 mL 11    allopurinol (ZYLOPRIM) 100 MG tablet TAKE 2 TABLETS BY MOUTH ONCE DAILY 60 tablet 0    aspirin (ECOTRIN) 81 MG EC tablet Take  81 mg by mouth once daily.      CARBOXYMETHYLCELLULOSE SODIUM (REFRESH OPHT) Apply to eye.      citalopram (CELEXA) 40 MG tablet TAKE 1 TABLET BY MOUTH DAILY 136 tablet 2    clonazePAM (KLONOPIN) 1 MG tablet TAKE 1 AND 1/2 TABLETS(1.5 MG) BY MOUTH EVERY DAY 45 tablet 0    cloNIDine (CATAPRES) 0.1 MG tablet Take 1 tablet (0.1 mg total) by mouth 2 (two) times daily. 180 tablet 0    hydrocodone-acetaminophen 5-325mg (NORCO) 5-325 mg per tablet Take 1/2 to 1 tablet one time during the the day as needed and 1 1/2 tablet at night 75 tablet 0    latanoprost 0.005 % ophthalmic solution INSTILL 1 DROP IN EACH EYE EVERY EVENING 2.5 mL 6    levothyroxine (SYNTHROID) 50 MCG tablet Take 1 tablet (50 mcg total) by mouth once daily. 90 tablet 3    losartan (COZAAR) 50 MG tablet TAKE 1 TABLET(50 MG) BY MOUTH EVERY DAY 90 tablet 1    MULTIVITAMIN Take 1 tablet by mouth Daily.      mupirocin (BACTROBAN) 2 % ointment Apply topically 3 (three) times daily. 30 g 0    SYMBICORT 80-4.5 mcg/actuation HFAA INHALE 2 PUFFS INTO THE LUNGS TWICE DAILY. RINSE MOUTH AFTER USE 10.2 g 11    brimonidine 0.1% (ALPHAGAN P) 0.1 % Drop Place 1 drop into both eyes 2 (two) times daily. 10 mL 6     No current facility-administered medications for this visit.          Family History    Problem  Relation  Age of Onset      Heart attack  Mother       Hypertension  Mother       Stroke  Mother       Arthritis  Father       Glaucoma  Daughter         h/o narrow angle        Strabismus  Neg Hx       Retinal detachment  Neg Hx       Macular degeneration  Neg Hx       Blindness  Neg Hx       Amblyopia  Neg Hx       History      Social History      Marital Status:        Spouse Name:  N/A      Number of Children:  N/A      Years of Education:  N/A      Occupational History      Not on file.      Social History Main Topics      Smoking status:  Former Smoker -- 3.0 packs/day for 40 years      Smokeless tobacco:  Never Used       "Alcohol Use:  Yes       Comment: social use of wine      Drug Use:  No      Sexually Active:  Not Currently      Birth Control/ Protection:  None      Other Topics  Concern      Not on file      Social History Narrative      No narrative on file      Review of Systems:   1. GENERAL: patient denies any fever, weight changes, generalized weakness, dizziness.   2. HEENT: patient denies headaches, visual disturbances, swallowing problems, sinus pain, nasal congestion.   3. CARDIOVASCULAR: patient denies chest pain, palpitations.   4. PULMONARY: patient denies current SOB, she denies coughing, hemoptysis, wheezing.   5. GASTROINTESTINAL: patient denies abdominal pain, nausea, vomiting, diarrhea.   6. GENITOURINARY: patient denies dysuria, hematuria, hesitancy, frequency  7. EXTREMITIES: patient denies LE edema, LE cramping  8. DERMATOLOGY: patient denies rashes, ulcers.   9. NEURO: patient denies tremors, extremity weakness, extremity numbness/tingling.   10. MUSCULOSKELETAL: patient denies joint swelling  11. HEMATOLOGY: patient denies rectal or gum bleeding.   12: PSYCH: patient denies anxiety, depression.       PHYSICAL EXAM:   /78   Pulse 64   Ht 5' 3" (1.6 m)   Wt 38 kg (83 lb 12.4 oz)   BMI 14.84 kg/m²       GENERAL: Emaciated lady presents to clinic with non-labored breathing and wheelchair  HEENT: PERRL, no nasal discharge, no icterus, no oral exudates, moist mucosal membranes.   NECK: no thyroid mass, no lymphadenopathy.   HEART: RRR S1/S2, no rubs, good peripheral pulses.   LUNGS: CTA bilaterally, no wheezing, breathing is nonlabored.   ABDOMEN: soft, nontender, not distended, bowel sounds are present, no abdominal hernia.   EXTREM: no LE edema  SKIN: no rashes, skin is warm and dry.   NEURO: Alert, responsive    LABORATORY RESULTS:   Lab Results   Component Value Date    CREATININE 1.2 04/17/2018    BUN 31 (H) 04/17/2018     04/17/2018    K 5.3 (H) 04/17/2018     04/17/2018    CO2 25 " 04/17/2018     Lab Results   Component Value Date    ALBUMIN 3.5 04/17/2018     Lab Results   Component Value Date    PTH 93 (H) 05/01/2014    CALCIUM 9.7 04/17/2018    PHOS 3.6 04/17/2018     Lab Results   Component Value Date    WBC 5.77 10/19/2017    HGB 10.2 (L) 10/19/2017    HCT 32.9 (L) 10/19/2017    MCV 99 (H) 10/19/2017     10/19/2017       Renal ultrasound from 1/18/12: right kidney 9 cm, left kidney 7.2 cm, bilateral simple cysts.     Protein Creatinine Ratios:    Creatinine, Random Ur   Date Value Ref Range Status   04/17/2018 23.0 15.0 - 325.0 mg/dL Final     Comment:     The random urine reference ranges provided were established   for 24 hour urine collections.  No reference ranges exist for  random urine specimens.  Correlate clinically.     10/19/2017 38.0 15.0 - 325.0 mg/dL Final     Comment:     The random urine reference ranges provided were established   for 24 hour urine collections.  No reference ranges exist for  random urine specimens.  Correlate clinically.     11/05/2016 51.0 15.0 - 325.0 mg/dL Final     Comment:     The random urine reference ranges provided were established   for 24 hour urine collections.  No reference ranges exist for  random urine specimens.  Correlate clinically.       Protein, Urine   Date Value Ref Range Status   03/26/2008 <5 (A) 0 - 10 mg/dl Final     Protein, Urine Random   Date Value Ref Range Status   04/17/2018 9 0 - 15 mg/dL Final     Comment:     The random urine reference ranges provided were established   for 24 hour urine collections.  No reference ranges exist for  random urine specimens.  Correlate clinically.     10/19/2017 15 0 - 15 mg/dL Final     Comment:     The random urine reference ranges provided were established   for 24 hour urine collections.  No reference ranges exist for  random urine specimens.  Correlate clinically.     11/05/2016 12 0 - 15 mg/dL Final     Comment:     The random urine reference ranges provided were established    for 24 hour urine collections.  No reference ranges exist for  random urine specimens.  Correlate clinically.       Prot/Creat Ratio, Ur   Date Value Ref Range Status   04/17/2018 0.39 (H) 0.00 - 0.20 Final   10/19/2017 0.39 (H) 0.00 - 0.20 Final   11/05/2016 0.24 (H) 0.00 - 0.20 Final         ASSESSMENT AND PLAN:   88 y.o. female with history of CAD, CHF, HTN, pulmonary HTN, COPD, arthritis, breast CA presents to the renal clinic for evaluation of renal insufficiency.     1. Renal insufficiency: Patient's renal function has stabilized with creatinine at 1.2. This is likely related to good hydration and patient was advised to hydrate with 60-65 ounces of water per day. Protein creatinine ratio was 0.39. Continue Losartan.   Patient's renal function will be monitored closely and she will return to the clinic in 5-6 months for follow up.     2. Electrolytes: borderline hyperkalemia. Low K diet was emphasized again. Patient may suffer from pseudohyperkalemia.     3. Acid base status: no issues.    4. Volume: Euvolemic.     5. Hypertension: good BP control.     6. Medications: Reviewed.     7. Anemia: stable Hgb.

## 2018-04-25 ENCOUNTER — OFFICE VISIT (OUTPATIENT)
Dept: PODIATRY | Facility: CLINIC | Age: 83
End: 2018-04-25
Payer: MEDICARE

## 2018-04-25 VITALS
SYSTOLIC BLOOD PRESSURE: 112 MMHG | WEIGHT: 83.75 LBS | HEART RATE: 70 BPM | DIASTOLIC BLOOD PRESSURE: 60 MMHG | BODY MASS INDEX: 14.84 KG/M2 | HEIGHT: 63 IN

## 2018-04-25 DIAGNOSIS — I73.00 RAYNAUD'S DISEASE WITHOUT GANGRENE: ICD-10-CM

## 2018-04-25 DIAGNOSIS — I77.1 ARTERIAL INSUFFICIENCY: ICD-10-CM

## 2018-04-25 DIAGNOSIS — B35.1 DERMATOPHYTOSIS OF NAIL: Primary | ICD-10-CM

## 2018-04-25 PROCEDURE — 99203 OFFICE O/P NEW LOW 30 MIN: CPT | Mod: 25,S$GLB,, | Performed by: PODIATRIST

## 2018-04-25 PROCEDURE — 99999 PR PBB SHADOW E&M-EST. PATIENT-LVL III: CPT | Mod: PBBFAC,,, | Performed by: PODIATRIST

## 2018-04-25 PROCEDURE — 11721 DEBRIDE NAIL 6 OR MORE: CPT | Mod: Q8,Q7,S$GLB, | Performed by: PODIATRIST

## 2018-04-25 PROCEDURE — 99499 UNLISTED E&M SERVICE: CPT | Mod: S$GLB,,, | Performed by: PODIATRIST

## 2018-04-25 NOTE — PROGRESS NOTES
[facility]  PODIATRIC MEDICINE AND SURGERY  PROGRESS NOTE     CHIEF COMPLAINT  Chief Complaint   Patient presents with    Foot Problem     Foot exam per Dr Delgado for bilateral vascular issues         HPI    SUBJECTIVE: Anju Rivera is a 88 y.o. female who  has a past medical history of Anxiety; Aortic valve disorder (6/17/2013); Arthritis; Breast cancer (1981); Cataract; Chronic bronchitis; Chronic diastolic heart failure (6/17/2013); COPD (chronic obstructive pulmonary disease) (6/17/2013); Coronary artery disease (6/17/2013); Decubitus skin ulcer (11/11/2014); Depression; Disorder of kidney and ureter; Emphysema of lung; Fall; Glaucoma; Hyperlipidemia; Hypertension (6/17/2013); Hyponatremia (6/3/2014); Hypothyroidism; Kidney cysts; MI (myocardial infarction) (05/1981); Mitral regurgitation (6/17/2013); Nail abnormality (7/16/2014); Osteoporosis; Peripheral vascular disease; Pneumonia; Pulmonary hypertension (6/17/2013); Sinus bradycardia (6/18/2013); Skin cancer; Squamous cell carcinoma; and Thyroid disease. Arnulfoispresents to clinic for high risk foot exam and care.  Anju  Presents with complaints of pain to both feet. This is chronic issue. She has complaints of painful toenails. History of amputation of LEFT great toe. She was referred by PCP for poor circulation. She has ultrasound performed which is consistent with mixed biphasic and triphasic waveforms. She has history of raynauds.      HgA1c:   Hemoglobin A1C   Date Value Ref Range Status   03/17/2008 5.5 4.5 - 6.2 % Final         PMH  Past Medical History:   Diagnosis Date    Anxiety     Aortic valve disorder 6/17/2013    Arthritis     Breast cancer 1981    Cataract     Chronic bronchitis     Chronic diastolic heart failure 6/17/2013    COPD (chronic obstructive pulmonary disease) 6/17/2013    Coronary artery disease 6/17/2013    Decubitus skin ulcer 11/11/2014    Depression     Disorder of kidney and ureter     Emphysema  of lung     copd    Fall     Glaucoma     Hyperlipidemia     Hypertension 6/17/2013    Hyponatremia 6/3/2014    Hypothyroidism     Kidney cysts     US retroper    MI (myocardial infarction) 05/1981    Mitral regurgitation 6/17/2013    Nail abnormality 7/16/2014    Osteoporosis     Peripheral vascular disease     Pneumonia     dx 02/2012    Pulmonary hypertension 6/17/2013    Sinus bradycardia 6/18/2013    Skin cancer     Squamous cell carcinoma     Thyroid disease     hypothyroidism     Patient Active Problem List   Diagnosis    Coronary artery disease    Chronic diastolic heart failure    Mitral regurgitation    COPD (chronic obstructive pulmonary disease)    Hypertension    Aortic valve disorder    Pulmonary hypertension    Sinus bradycardia    Mixed hyperlipidemia    COAG (chronic open-angle glaucoma) - Both Eyes    HX of MI    Hypothyroidism    Peripheral vascular disease    Amputated great toe    Anxiety    Shortness of breath    PVC's (premature ventricular contractions)    Proteinuria    Chronic kidney disease, stage III (moderate)    History of breast cancer    History of skin cancer    Major depression    Carotid artery disease    Ectatic aorta    Primary open angle glaucoma of both eyes, severe stage    Pseudophakia of both eyes    Refractive error    Right shoulder pain    Closed left arm fracture    Paronychia of finger of right hand    Bone disorder    Bilateral foot pain       MEDS  Current Outpatient Prescriptions on File Prior to Visit   Medication Sig Dispense Refill    albuterol (VENTOLIN HFA) 90 mcg/actuation inhaler Inhale 2 puffs into the lungs every 4 (four) hours as needed for Wheezing. 18 g 3    albuterol-ipratropium 2.5mg-0.5mg/3mL (DUO-NEB) 0.5 mg-3 mg(2.5 mg base)/3 mL nebulizer solution USE 1 VIAL VIA NEBULIZER EVERY 6 TO 8 HOURS AS NEEDED FOR WHEEZING 360 mL 11    allopurinol (ZYLOPRIM) 100 MG tablet TAKE 2 TABLETS BY MOUTH ONCE DAILY  60 tablet 0    aspirin (ECOTRIN) 81 MG EC tablet Take 81 mg by mouth once daily.      CARBOXYMETHYLCELLULOSE SODIUM (REFRESH OPHT) Apply to eye.      citalopram (CELEXA) 40 MG tablet TAKE 1 TABLET BY MOUTH DAILY 136 tablet 2    clonazePAM (KLONOPIN) 1 MG tablet TAKE 1 AND 1/2 TABLETS(1.5 MG) BY MOUTH EVERY DAY 45 tablet 0    cloNIDine (CATAPRES) 0.1 MG tablet Take 1 tablet (0.1 mg total) by mouth 2 (two) times daily. 180 tablet 0    hydrocodone-acetaminophen 5-325mg (NORCO) 5-325 mg per tablet Take 1/2 to 1 tablet one time during the the day as needed and 1 1/2 tablet at night 75 tablet 0    latanoprost 0.005 % ophthalmic solution INSTILL 1 DROP IN EACH EYE EVERY EVENING 2.5 mL 6    levothyroxine (SYNTHROID) 50 MCG tablet Take 1 tablet (50 mcg total) by mouth once daily. 90 tablet 3    losartan (COZAAR) 50 MG tablet TAKE 1 TABLET(50 MG) BY MOUTH EVERY DAY 90 tablet 1    MULTIVITAMIN Take 1 tablet by mouth Daily.      mupirocin (BACTROBAN) 2 % ointment Apply topically 3 (three) times daily. 30 g 0    SYMBICORT 80-4.5 mcg/actuation HFAA INHALE 2 PUFFS INTO THE LUNGS TWICE DAILY. RINSE MOUTH AFTER USE 10.2 g 11    brimonidine 0.1% (ALPHAGAN P) 0.1 % Drop Place 1 drop into both eyes 2 (two) times daily. 10 mL 6     No current facility-administered medications on file prior to visit.        [facility]     Past Surgical History:   Procedure Laterality Date    ADENOIDECTOMY      age 20    AMPUTATION Left 1993    Left Great toe    BREAST SURGERY Right 1996    breast cancer    CARDIAC CATHETERIZATION      1981    CATARACT EXTRACTION      EYE SURGERY      right cataract removal. lens replacement    FEMUR FRACTURE SURGERY      FINGER AMPUTATION  1980s    digits 4, 5    FRACTURE SURGERY  2012, & 2014, & 2016    left hip twice, Left Femur the 3rd time.    masectomy Right 1996    ORIF HIP FRACTURE Left 11/2012    TOE AMPUTATION Left 1980s    left foot great hallux    TONSILLECTOMY      age 20    WRIST  "ARTHROPLASTY Left 2000s        ALL  Review of patient's allergies indicates:   Allergen Reactions    Atorvastatin Other (See Comments)     Dry mouth    Calcium channel blocking agent diltiazem analogues        SOC     Social History   Substance Use Topics    Smoking status: Former Smoker     Packs/day: 0.50     Years: 40.00     Quit date: 2002    Smokeless tobacco: Never Used    Alcohol use No         Family HX    Family History   Problem Relation Age of Onset    Heart attack Mother     Hypertension Mother     Stroke Mother     Arthritis Father     Glaucoma Daughter      h/o narrow angle    Cancer Sister     Cancer Sister     Strabismus Neg Hx     Retinal detachment Neg Hx     Macular degeneration Neg Hx     Blindness Neg Hx     Amblyopia Neg Hx             REVIEW OF SYSTEMS  General: Denies any fever or chills  Chest: Denies shortness of breath, wheezing, coughing, or sputum production  Heart: Denies chest pain.  As noted above and per history of current illness above, otherwise negative in the remainder of the 14 systems.     PHYSICAL EXAM  Vitals:    04/25/18 1119   BP: 112/60   Pulse: 70   Weight: 38 kg (83 lb 12.4 oz)   Height: 5' 3" (1.6 m)   PainSc: 0-No pain       GEN:  This patient is well-developed, well-nourished and appears stated age, well-oriented to person, place and time, and cooperative and pleasant on today's visit.      LOWER EXTREMITY  Vascular:   · Non palpable pedal pulses  · Skin temperature cool to cool from proximally to distally   · CFT <5 secs b/l  · There is dependent rubor bilateral LE      Dermatologic:   · Thickened, dystrophic, elongated toenails with subungal debris x 9  · No open skin lesions noted  · No erythema or drainage noted b/l.  · Webspaces are C/D/I B/L.  · There is no hyperkeratotic tissue noted .  · Skin texture and turgor dry  · There is no pedal hair growth noted    Neurologic:  · Protective sensation absent at 0/10 sites upon examination with [place] " [place]insten 5.07 g monofilament.   · Propioception intact at 1st MTPJ b/l.   · Babinski reflex absent b/l. Light touch and sharp/dull sensation intact b/l.    Musculoskeletal/Orthopedic:  · No symptomatic structural abnormalities noted.   · Amputation of left hallux noted  · There are rigid digital contractures noted x 9  · Muscle strength is 5/5 for foot inverters, everters, plantarflexors, and dorsiflexors. Muscle tone is normal.  · Pain free range of motion in all four quadrants with stiffness and limitation b/l       IMAGING:  Arterial ultrasound reviewed    ASSESSMENT  Dermatophytosis of nail    Arterial insufficiency    Raynaud's disease without gangrene    Toe amputation status, left        PLAN  -patient was examined and evaulated  -Discuss presenting problems, etiology, pathologic processes and management options with patient today.   -I counseled the patient on their conditions, their implications and medical management.   -Recommend follow up with vascular surgeon or cardiologist. Pt has cardiologist- Dr. Trejo who she is scheduled to see on 4/30. Will review arterial ultrasound.   -will possibly need KESHA/ assessment of toe pressures. Might benefit from anticoagulant therapy  Also has vasopastic condition component with raynauds  -With patient's permission, nails were aggressively reduced and debrided x 9 to their soft tissue attachment mechanically and with electric , removing all offending nail and debris. Patient relates relief following the procedure.   -DM shoe rx/filler   -will need close follow up with podiatry; foot eval and high risk foot care. RTC in 3 months        Report Electronically Signed By:  Mague Pratt DPM   Podiatric Medicine & Surgery  [facility]  4/29/2018

## 2018-04-25 NOTE — LETTER
April 29, 2018      Jose Ramon Delgado MD  [address] [facility] [address] [place] [address]           [facility] - Podiatry  [address]  [place] [address]  Phone: [phone]  Fax: [phone]          Patient: Anju Rivera   MR Number: [id number]   YOB: 1929   Date of Visit: 4/25/2018       Dear Dr. Jose Ramon Delgado:    Thank you for referring Anju Rivera to me for evaluation. Attached you will find relevant portions of my assessment and plan of care.    If you have questions, please do not hesitate to call me. I look forward to following Anju Rivera along with you.    Sincerely,    Mague Pratt, ALDAIR    Enclosure  CC:  No Recipients    If you would like to receive this communication electronically, please contact [email or web address] or [phone] to request more information on [email or web address] Link access.    For providers and/or their staff who would like to refer a patient to [facility], please contact us through our one-stop-shop provider referral line, [facility], at [phone].    If you feel you have received this communication in error or would no longer like to receive these types of communications, please e-mail [email or web address]

## 2018-04-30 ENCOUNTER — OFFICE VISIT (OUTPATIENT)
Dept: CARDIOLOGY | Facility: CLINIC | Age: 83
End: 2018-04-30
Payer: MEDICARE

## 2018-04-30 VITALS
HEIGHT: 63 IN | DIASTOLIC BLOOD PRESSURE: 66 MMHG | HEART RATE: 68 BPM | BODY MASS INDEX: 14.71 KG/M2 | WEIGHT: 83 LBS | SYSTOLIC BLOOD PRESSURE: 130 MMHG

## 2018-04-30 DIAGNOSIS — I27.20 PULMONARY HYPERTENSION: Chronic | ICD-10-CM

## 2018-04-30 DIAGNOSIS — I49.3 PVC'S (PREMATURE VENTRICULAR CONTRACTIONS): ICD-10-CM

## 2018-04-30 DIAGNOSIS — I25.10 CORONARY ARTERY DISEASE DUE TO LIPID RICH PLAQUE: Chronic | ICD-10-CM

## 2018-04-30 DIAGNOSIS — R06.02 SHORTNESS OF BREATH: ICD-10-CM

## 2018-04-30 DIAGNOSIS — I25.83 CORONARY ARTERY DISEASE DUE TO LIPID RICH PLAQUE: Chronic | ICD-10-CM

## 2018-04-30 DIAGNOSIS — I50.32 CHRONIC DIASTOLIC HEART FAILURE: Chronic | ICD-10-CM

## 2018-04-30 DIAGNOSIS — I10 ESSENTIAL HYPERTENSION: Chronic | ICD-10-CM

## 2018-04-30 DIAGNOSIS — E78.2 MIXED HYPERLIPIDEMIA: Chronic | ICD-10-CM

## 2018-04-30 DIAGNOSIS — I73.9 PERIPHERAL VASCULAR DISEASE: Primary | ICD-10-CM

## 2018-04-30 DIAGNOSIS — I77.9 BILATERAL CAROTID ARTERY DISEASE: ICD-10-CM

## 2018-04-30 DIAGNOSIS — I25.2 OLD MI (MYOCARDIAL INFARCTION): Chronic | ICD-10-CM

## 2018-04-30 DIAGNOSIS — I34.0 NON-RHEUMATIC MITRAL REGURGITATION: Chronic | ICD-10-CM

## 2018-04-30 PROCEDURE — 99999 PR PBB SHADOW E&M-EST. PATIENT-LVL IV: CPT | Mod: PBBFAC,,, | Performed by: INTERNAL MEDICINE

## 2018-04-30 PROCEDURE — 99499 UNLISTED E&M SERVICE: CPT | Mod: S$GLB,,, | Performed by: INTERNAL MEDICINE

## 2018-04-30 PROCEDURE — 99214 OFFICE O/P EST MOD 30 MIN: CPT | Mod: S$GLB,,, | Performed by: INTERNAL MEDICINE

## 2018-04-30 NOTE — PROGRESS NOTES
Subjective:    Patient ID:  Anju Rivera is a 88 y.o. female who presents for evaluation of Peripheral Arterial Disease; Hyperlipidemia; Hypertension; and Coronary Artery Disease      HPI Mrs. Rivera returns for follow-up.    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 Negative stress mpi 2/16 and echo 2/16 showed normal LVEF, mild AS, mild PHTN.  She had normal diastolic function on echo.  Now here for f/u.  Pt asked to f/u for PAD.  She had B LE arterial u/s done 4/18 with PAD noted but no hemodynamically significant stenosis per official report summary.  PCP sent to podiatry for red left big toe. She has chronic discoloration of her feet and restless leg syndrome.  Feet discolored x 5 years.  She has chronic dyspnea, on home O2.  No active angina.  BP is stable on current meds.  Compliant with meds.  On asa.  No tia/cva sxs.     Current Outpatient Prescriptions:     albuterol (VENTOLIN HFA) 90 mcg/actuation inhaler, Inhale 2 puffs into the lungs every 4 (four) hours as needed for Wheezing., Disp: 18 g, Rfl: 3    albuterol-ipratropium 2.5mg-0.5mg/3mL (DUO-NEB) 0.5 mg-3 mg(2.5 mg base)/3 mL nebulizer solution, USE 1 VIAL VIA NEBULIZER EVERY 6 TO 8 HOURS AS NEEDED FOR WHEEZING, Disp: 360 mL, Rfl: 11    allopurinol (ZYLOPRIM) 100 MG tablet, TAKE 2 TABLETS BY MOUTH ONCE DAILY, Disp: 60 tablet, Rfl: 0    aspirin (ECOTRIN) 81 MG EC tablet, Take 81 mg by mouth once daily., Disp: , Rfl:     brimonidine 0.1% (ALPHAGAN P) 0.1 % Drop, Place 1 drop into both eyes 2 (two) times daily., Disp: 10 mL, Rfl: 6    citalopram (CELEXA) 40 MG tablet, TAKE 1 TABLET BY MOUTH DAILY, Disp: 136 tablet, Rfl: 2    clonazePAM (KLONOPIN) 1 MG tablet, TAKE 1 AND 1/2 TABLETS(1.5 MG) BY MOUTH  "EVERY DAY, Disp: 45 tablet, Rfl: 0    cloNIDine (CATAPRES) 0.1 MG tablet, Take 1 tablet (0.1 mg total) by mouth 2 (two) times daily., Disp: 180 tablet, Rfl: 0    hydrocodone-acetaminophen 5-325mg (NORCO) 5-325 mg per tablet, Take 1/2 to 1 tablet one time during the the day as needed and 1 1/2 tablet at night, Disp: 75 tablet, Rfl: 0    latanoprost 0.005 % ophthalmic solution, INSTILL 1 DROP IN EACH EYE EVERY EVENING, Disp: 2.5 mL, Rfl: 6    levothyroxine (SYNTHROID) 50 MCG tablet, Take 1 tablet (50 mcg total) by mouth once daily., Disp: 90 tablet, Rfl: 3    losartan (COZAAR) 50 MG tablet, TAKE 1 TABLET(50 MG) BY MOUTH EVERY DAY, Disp: 90 tablet, Rfl: 1    MULTIVITAMIN, Take 1 tablet by mouth Daily., Disp: , Rfl:     mupirocin (BACTROBAN) 2 % ointment, Apply topically 3 (three) times daily., Disp: 30 g, Rfl: 0    SYMBICORT 80-4.5 mcg/actuation HFAA, INHALE 2 PUFFS INTO THE LUNGS TWICE DAILY. RINSE MOUTH AFTER USE, Disp: 10.2 g, Rfl: 11    CARBOXYMETHYLCELLULOSE SODIUM (REFRESH OPHT), Apply to eye., Disp: , Rfl:         Review of Systems   Constitution: Positive for weakness and malaise/fatigue.   HENT: Negative.    Eyes: Negative.    Cardiovascular: Positive for dyspnea on exertion.   Respiratory: Positive for shortness of breath.    Endocrine: Negative.    Hematologic/Lymphatic: Negative.    Skin: Negative.    Musculoskeletal: Positive for arthritis and joint pain.   Gastrointestinal: Negative.    Genitourinary: Negative.    Psychiatric/Behavioral: Negative.    Allergic/Immunologic: Negative.        /66 (BP Location: Left arm, Patient Position: Sitting, BP Method: Pediatric (Manual))   Pulse 68   Ht 5' 3" (1.6 m)   Wt 37.6 kg (83 lb)   BMI 14.70 kg/m²     Wt Readings from Last 3 Encounters:   04/30/18 37.6 kg (83 lb)   04/25/18 38 kg (83 lb 12.4 oz)   04/24/18 38 kg (83 lb 12.4 oz)     Temp Readings from Last 3 Encounters:   04/17/18 98.6 °F (37 °C) (Tympanic)   01/02/18 98.2 °F (36.8 °C) " (Tympanic)   12/20/17 97.3 °F (36.3 °C) (Tympanic)     BP Readings from Last 3 Encounters:   04/30/18 130/66   04/25/18 112/60   04/24/18 138/78     Pulse Readings from Last 3 Encounters:   04/30/18 68   04/25/18 70   04/24/18 64          Objective:    Physical Exam   Constitutional: She is oriented to person, place, and time. Vital signs are normal. She appears well-developed and well-nourished. She is active and cooperative. She does not have a sickly appearance. She does not appear ill. No distress.   HENT:   Head: Normocephalic.   Neck: Neck supple. Normal carotid pulses, no hepatojugular reflux and no JVD present. Carotid bruit is not present. No thyromegaly present.   Cardiovascular: Normal rate, regular rhythm, S1 normal, S2 normal and normal heart sounds.  PMI is not displaced.  Exam reveals no gallop and no friction rub.    No murmur heard.  Pulses:       Radial pulses are 2+ on the right side, and 2+ on the left side.        Dorsalis pedis pulses are 0 on the right side, and 0 on the left side.        Posterior tibial pulses are 1+ on the right side, and 1+ on the left side.   Left great toe amputation noted   Pulmonary/Chest: Effort normal and breath sounds normal. She has no wheezes. She has no rales.   Abdominal: Soft. Normal appearance, normal aorta and bowel sounds are normal. She exhibits no mass. There is no tenderness.   Musculoskeletal: She exhibits no edema.   Lymphadenopathy:     She has no cervical adenopathy.   Neurological: She is alert and oriented to person, place, and time.   Skin: Skin is warm. She is not diaphoretic.   Psychiatric: She has a normal mood and affect. Her behavior is normal.   Nursing note and vitals reviewed.      I have reviewed all pertinent labs and cardiac studies.      Chemistry        Component Value Date/Time     04/17/2018 1032    K 5.3 (H) 04/17/2018 1032     04/17/2018 1032    CO2 25 04/17/2018 1032    BUN 31 (H) 04/17/2018 1032    CREATININE 1.2  04/17/2018 1032    GLU 85 04/17/2018 1032        Component Value Date/Time    CALCIUM 9.7 04/17/2018 1032    ALKPHOS 60 04/07/2017 1509    AST 35 04/07/2017 1509    ALT 18 04/07/2017 1509    BILITOT 0.6 04/07/2017 1509    ESTGFRAFRICA 46.6 (A) 04/17/2018 1032    EGFRNONAA 40.4 (A) 04/17/2018 1032        Lab Results   Component Value Date    WBC 5.77 10/19/2017    HGB 10.2 (L) 10/19/2017    HCT 32.9 (L) 10/19/2017    MCV 99 (H) 10/19/2017     10/19/2017     Lab Results   Component Value Date    HGBA1C 5.5 03/17/2008     Lab Results   Component Value Date    CHOL 189 04/17/2018    CHOL 179 08/01/2016    CHOL 185 03/18/2014     Lab Results   Component Value Date    HDL 80 (H) 04/17/2018    HDL 61 08/01/2016    HDL 72 03/18/2014     Lab Results   Component Value Date    LDLCALC 100.6 04/17/2018    LDLCALC 96.2 08/01/2016    LDLCALC 99.6 03/18/2014     Lab Results   Component Value Date    TRIG 42 04/17/2018    TRIG 109 08/01/2016    TRIG 67 03/18/2014     Lab Results   Component Value Date    CHOLHDL 42.3 04/17/2018    CHOLHDL 34.1 08/01/2016    CHOLHDL 38.9 03/18/2014     US LOWER EXTREMITY ARTERIES BILATERAL    CLINICAL HISTORY:  Peripheral vascular disease, unspecified    TECHNIQUE:  Bilateral lower extremity arterial duplex ultrasound.  Multiple grayscale and color Doppler images were obtained in addition to waveform analysis.    COMPARISON:  None.    FINDINGS:  The peak systolic velocities on the right are as follows, and measured in cm/sec:    Common femoral artery: 185    Proximal SFA: 128    Mid SFA: 58    Distal SFA: 65    Popliteal artery: 111    Anterior tibial artery: 68    Peroneal artery: 94    Posterior tibial artery: Unable to detect flow secondary to heavy calcification.    Dorsalis pedis artery: 79    The peak systolic velocities on the left are as follows, and measured in cm/sec:    Common femoral artery: 155    Proximal SFA: 81    Mid SFA: 92    Distal SFA: 42    Popliteal artery:  60    Anterior tibial artery: 54    Peroneal artery: 118    Posterior tibial artery: 52    Dorsalis pedis artery: 51    Mixed triphasic and biphasic waveforms noted throughout the bilateral lower extremities.  There is moderately diffuse atherosclerotic irregularity involving both lower extremities.   Impression       1.  No evidence to suggest hemodynamically significant stenosis.  Prominent calcification associated with the posterior tibial artery on the right and therefore flow was unable to be obtained.      Electronically signed by: Nishant Morton,   Date: 04/19/2018           Assessment:       1. Peripheral vascular disease    2. Shortness of breath    3. PVC's (premature ventricular contractions)    4. Pulmonary hypertension    5. Mixed hyperlipidemia    6. Non-rheumatic mitral regurgitation    7. Coronary artery disease due to lipid rich plaque    8. Chronic diastolic heart failure    9. Bilateral carotid artery disease    10. HX of MI    11. Essential hypertension         Plan:             Evaluate PAD further with KESHA testing.  Vascular arterial u/s no occlusive stenosis.  Continue medical tx for CAD/CHF.  Pt is elderly, very frail condition and not a real good candidate for invasive procedures.  Continue home O2.  No changes to meds today.    Phone review for test results.

## 2018-05-16 ENCOUNTER — OFFICE VISIT (OUTPATIENT)
Dept: INTERNAL MEDICINE | Facility: CLINIC | Age: 83
End: 2018-05-16
Payer: MEDICARE

## 2018-05-16 ENCOUNTER — CLINICAL SUPPORT (OUTPATIENT)
Dept: CARDIOLOGY | Facility: CLINIC | Age: 83
End: 2018-05-16
Attending: INTERNAL MEDICINE
Payer: MEDICARE

## 2018-05-16 VITALS
SYSTOLIC BLOOD PRESSURE: 124 MMHG | OXYGEN SATURATION: 91 % | DIASTOLIC BLOOD PRESSURE: 62 MMHG | BODY MASS INDEX: 15.16 KG/M2 | WEIGHT: 85.56 LBS | HEART RATE: 74 BPM | TEMPERATURE: 99 F | HEIGHT: 63 IN

## 2018-05-16 DIAGNOSIS — Z91.81 AT HIGH RISK FOR FALLS: ICD-10-CM

## 2018-05-16 DIAGNOSIS — E78.2 MIXED HYPERLIPIDEMIA: Chronic | ICD-10-CM

## 2018-05-16 DIAGNOSIS — F32.4 MAJOR DEPRESSIVE DISORDER WITH SINGLE EPISODE, IN PARTIAL REMISSION: ICD-10-CM

## 2018-05-16 DIAGNOSIS — H40.1133 PRIMARY OPEN ANGLE GLAUCOMA OF BOTH EYES, SEVERE STAGE: ICD-10-CM

## 2018-05-16 DIAGNOSIS — M81.0 OSTEOPOROSIS, UNSPECIFIED OSTEOPOROSIS TYPE, UNSPECIFIED PATHOLOGICAL FRACTURE PRESENCE: ICD-10-CM

## 2018-05-16 DIAGNOSIS — I10 ESSENTIAL HYPERTENSION: Chronic | ICD-10-CM

## 2018-05-16 DIAGNOSIS — G89.29 CHRONIC RIGHT SHOULDER PAIN: ICD-10-CM

## 2018-05-16 DIAGNOSIS — I73.9 PERIPHERAL VASCULAR DISEASE: ICD-10-CM

## 2018-05-16 DIAGNOSIS — I35.9 AORTIC VALVE DISORDER: Chronic | ICD-10-CM

## 2018-05-16 DIAGNOSIS — Z00.00 ENCOUNTER FOR PREVENTIVE HEALTH EXAMINATION: Primary | ICD-10-CM

## 2018-05-16 DIAGNOSIS — J44.9 CHRONIC OBSTRUCTIVE PULMONARY DISEASE, UNSPECIFIED COPD TYPE: Chronic | ICD-10-CM

## 2018-05-16 DIAGNOSIS — F41.9 ANXIETY: ICD-10-CM

## 2018-05-16 DIAGNOSIS — I77.9 BILATERAL CAROTID ARTERY DISEASE: ICD-10-CM

## 2018-05-16 DIAGNOSIS — S98.112A AMPUTATED GREAT TOE OF LEFT FOOT: ICD-10-CM

## 2018-05-16 DIAGNOSIS — I77.1 TORTUOUS AORTA: ICD-10-CM

## 2018-05-16 DIAGNOSIS — I25.83 CORONARY ARTERY DISEASE DUE TO LIPID RICH PLAQUE: Chronic | ICD-10-CM

## 2018-05-16 DIAGNOSIS — I25.10 CORONARY ARTERY DISEASE DUE TO LIPID RICH PLAQUE: Chronic | ICD-10-CM

## 2018-05-16 DIAGNOSIS — E03.9 HYPOTHYROIDISM, UNSPECIFIED TYPE: ICD-10-CM

## 2018-05-16 DIAGNOSIS — I27.20 PULMONARY HYPERTENSION: Chronic | ICD-10-CM

## 2018-05-16 DIAGNOSIS — N18.30 CHRONIC KIDNEY DISEASE, STAGE III (MODERATE): ICD-10-CM

## 2018-05-16 DIAGNOSIS — M25.511 CHRONIC RIGHT SHOULDER PAIN: ICD-10-CM

## 2018-05-16 DIAGNOSIS — I50.32 CHRONIC DIASTOLIC HEART FAILURE: Chronic | ICD-10-CM

## 2018-05-16 DIAGNOSIS — Z85.3 HISTORY OF BREAST CANCER: ICD-10-CM

## 2018-05-16 DIAGNOSIS — Z85.828 HISTORY OF SKIN CANCER: ICD-10-CM

## 2018-05-16 LAB — VASCULAR ANKLE BRACHIAL INDEX (ABI) LEFT: 0.66 (ref 0.9–1.2)

## 2018-05-16 PROCEDURE — G0439 PPPS, SUBSEQ VISIT: HCPCS | Mod: S$GLB,,, | Performed by: PHYSICIAN ASSISTANT

## 2018-05-16 PROCEDURE — 93922 UPR/L XTREMITY ART 2 LEVELS: CPT | Mod: S$GLB,,, | Performed by: INTERNAL MEDICINE

## 2018-05-16 PROCEDURE — 99999 PR PBB SHADOW E&M-EST. PATIENT-LVL IV: CPT | Mod: PBBFAC,,, | Performed by: PHYSICIAN ASSISTANT

## 2018-05-16 PROCEDURE — 99499 UNLISTED E&M SERVICE: CPT | Mod: S$GLB,,, | Performed by: PHYSICIAN ASSISTANT

## 2018-05-16 NOTE — PATIENT INSTRUCTIONS
Counseling and Referral of Other Preventative  (Italic type indicates deductible and co-insurance are waived)    Patient Name: Anju Rivera  Today's Date: 5/16/2018    Health Maintenance       Date Due Completion Date    TETANUS VACCINE 07/21/1947 ---    Sign Pain Contract 07/21/1947 ---    Complete Opioid Risk Tool 07/21/1947 ---    Urine Drug Screen 07/21/1947 ---    Naloxone Prescription 07/21/1947 ---    Influenza Vaccine 08/01/2018 10/26/2017    Override on 10/23/2012: Done    Lipid Panel 04/17/2019 4/17/2018    DEXA SCAN 08/15/2019 8/15/2017        No orders of the defined types were placed in this encounter.    The following information is provided to all patients.  This information is to help you find resources for any of the problems found today that may be affecting your health:                Living healthy guide: www.[facility].[place].gov      Understanding Diabetes: www.diabetes.org      Eating healthy: www.cdc.gov/healthyweight      [facility] home safety checklist: www.cdc.gov/steadi/patient.html      Agency on Aging: www.goea.[place].[facility]      Alcoholics anonymous (AA): www.aa.org      Physical Activity: www.darien.nih.gov/kb7eqjk      Tobacco use: www.quitwithusla.org

## 2018-05-16 NOTE — PROGRESS NOTES
"Anju Rivera presented for a  Medicare AWV and comprehensive Health Risk Assessment today. The following components were reviewed and updated:    · Medical history  · Family History  · Social history  · Allergies and Current Medications  · Health Risk Assessment  · Health Maintenance  · Care Team     ** See Completed Assessments for Annual Wellness Visit within the encounter summary.**       The following assessments were completed:  · Living Situation  · CAGE  · Depression Screening  · Timed Get Up and Go  · Whisper Test  · Cognitive Function Screening  · Nutrition Screening  · ADL Screening  · PAQ Screening    Vitals:    05/16/18 1122   BP: 124/62   BP Location: Left arm   Patient Position: Sitting   BP Method: Small (Manual)   Pulse: 74   Temp: 99.2 °F (37.3 °C)   TempSrc: Tympanic   SpO2: (!) 91%   Weight: 38.8 kg (85 lb 8.6 oz)   Height: 5' 3" (1.6 m)     Body mass index is 15.15 kg/m².     Physical Exam   Constitutional: She is oriented to person, place, and time. She appears well-developed and well-nourished. No distress.   Elderly frail pt presents in wheelchair; on oxygen   HENT:   Head: Normocephalic and atraumatic.   Eyes: EOM are normal. No scleral icterus.   Neck: Neck supple.   Cardiovascular: Normal rate and regular rhythm.    Pulmonary/Chest: Effort normal and breath sounds normal. No respiratory distress. She has no decreased breath sounds. She has no wheezes. She has no rhonchi. She has no rales.   Musculoskeletal: Normal range of motion. She exhibits no edema.   L great toe and R 4th and 5th fingers with amputation   Neurological: She is alert and oriented to person, place, and time. No cranial nerve deficit.   Skin: Skin is warm and dry. No rash noted.   Psychiatric: She has a normal mood and affect. Her speech is normal and behavior is normal. Thought content normal.       Diagnoses and health risks identified today and associated recommendations/orders:    1. Encounter for preventive health " "examination  Done today. Pt reports having updated tetanus shot Dec 2017.    2. Chronic right shoulder pain  Pt taking Norco by PCP. Continue current treatment plan as prescribed by your PCP and f/u with your PCP for further management.    3. Chronic obstructive pulmonary disease, unspecified COPD type  PFT 12/6/16 "severe expiratory airflow obstruction." Pt is on home O2. Pt using Symbicort, Duoneb, and albuterol HFA. Continue current treatment plan as prescribed by your PCP and pulmonologist and f/u with them for further management.    4. Chronic diastolic heart failure  2D Echo 2/24/16. Stable, compensated. Continue current treatment plan as prescribed by your PCP and cardiologist and f/u with them for further management.    5. Peripheral vascular disease  Pt is scheduled to have KESHA today per cardiology. Continue current treatment plan as prescribed by your PCP and cardiologist and f/u with them for further management.    6. Bilateral carotid artery disease  Carotid artery U/S 1/11/12. Continue current treatment plan as prescribed by your PCP and cardiologist and f/u with them for further management.    7. Amputated great toe of left foot  Pt denies complications for toe amputation (in 1980s). Continue current treatment plan as prescribed by your PCP and podiatrist and f/u with them for further management.    8. Tortuous aorta  CXR 10/4/16. Continue current treatment plan as prescribed by your PCP and cardiologist and f/u with them for further management.    9. Major depressive disorder with single episode, in partial remission  Pt taking Celexa as prescribed by PCP. PHQ-2 score today = 2. Continue current treatment plan as prescribed by your PCP and f/u with your PCP for further management.    10. Anxiety  Pt takes clonazepam. Continue current treatment plan as prescribed by your PCP and f/u with your PCP for further management.    11. Primary open angle glaucoma of both eyes, severe stage  Pt using brimonidine " drops. Continue current treatment plan as prescribed by your eye doctor and f/u with him for further management.    12. Pulmonary hypertension  Continue current treatment plan as prescribed by your cardiologist and f/u with him for further management.    13. Coronary artery disease due to lipid rich plaque  Pt reports had MI in 1980s. Pt taking aspirin. Continue current treatment plan as prescribed by your cardiologist and f/u with him for further management.    14. Essential hypertension  Controlled. Pt taking losartan and clonidine. Continue current treatment plan as prescribed by your PCP and cardiologist and f/u with them for further management.    15. Aortic valve disorder  2D Echo 2/24/16. Continue current treatment plan as prescribed by your cardiologist and f/u with him for further management.    16. Mixed hyperlipidemia  Continue current treatment plan as prescribed by your PCP and cardiologist and f/u with them for further management.  Component      Latest Ref Rng & Units 4/17/2018 8/1/2016 3/18/2014   Cholesterol      120 - 199 mg/dL 189 179 185   Triglycerides      30 - 150 mg/dL 42 109 67   HDL      40 - 75 mg/dL 80 (H) 61 72   LDL Cholesterol      63.0 - 159.0 mg/dL 100.6 96.2 99.6   HDL/Chol Ratio      20.0 - 50.0 % 42.3 34.1 38.9   Total Cholesterol/HDL Ratio      2.0 - 5.0 2.4 2.9 2.6   Non-HDL Cholesterol      mg/dL 109 118 113     17. Chronic kidney disease, stage III (moderate)  Continue current treatment plan as prescribed by your PCP and nephrologist and f/u with them for further management.  Component      Latest Ref Rng & Units 4/17/2018 10/19/2017 11/5/2016   Glucose      70 - 110 mg/dL 85 80 163 (H)   Sodium      136 - 145 mmol/L 140 140 142   Potassium      3.5 - 5.1 mmol/L 5.3 (H) 4.2 4.7   Chloride      95 - 110 mmol/L 106 107 109   CO2      23 - 29 mmol/L 25 25 25   BUN, Bld      8 - 23 mg/dL 31 (H) 32 (H) 34 (H)   Calcium      8.7 - 10.5 mg/dL 9.7 9.0 9.0   Creatinine      0.5 - 1.4  mg/dL 1.2 1.3 1.3   Albumin      3.5 - 5.2 g/dL 3.5 3.1 (L) 3.4 (L)   Phosphorus      2.7 - 4.5 mg/dL 3.6 3.3 3.8   eGFR if African American      >60 mL/min/1.73 m:2 46.6 (A) 42.3 (A) 42.6 (A)   eGFR if non African American      >60 mL/min/1.73 m:2 40.4 (A) 36.7 (A) 37.0 (A)   Anion Gap      8 - 16 mmol/L 9 8 8     18. Hypothyroidism, unspecified type  Pt taking Synthroid. Continue current treatment plan as prescribed by your PCP and f/u with your PCP for further management.  Component      Latest Ref Rng & Units 4/17/2018 3/14/2017 8/1/2016   TSH      0.400 - 4.000 uIU/mL 1.740 1.262 0.523     19. Osteoporosis, unspecified osteoporosis type, unspecified pathological fracture presence  DEXA 8/15/17. Continue current treatment plan as prescribed by your PCP and f/u with your PCP for further management.    20. History of breast cancer  Pt reports R mastectomy in 1996 and Tamoxifen X 5 years. Continue current treatment plan as prescribed by your PCP and f/u with your PCP for further management.    21. History of skin cancer  Hx of BCC on face, arm, and back. Continue current treatment plan as prescribed by your PCP and f/u with your PCP for further management.    22. At high risk for falls  Pt uses walker and occasionally wheelchair. Continue current treatment plan as prescribed by your PCP and f/u with your PCP for further management.    Please obtain any medical records from past / present outside medical providers and give to PCP for review and further medical recommendations.    Provided Anju with a 5-10 year written screening schedule and personal prevention plan. Recommendations were developed using the USPSTF age appropriate recommendations. Education, counseling, and referrals were provided as needed. After Visit Summary printed and given to patient which includes a list of additional screenings\tests needed.    Follow-up in about 1 year (around 5/16/2019) for HRA. F/u with PCP Dr. Delgado and specialists as  recommended for health management.    Marcy Smalls PA

## 2018-05-18 RX ORDER — CLONAZEPAM 1 MG/1
TABLET ORAL
Qty: 45 TABLET | Refills: 0 | Status: SHIPPED | OUTPATIENT
Start: 2018-05-18 | End: 2018-06-29 | Stop reason: SDUPTHER

## 2018-05-18 RX ORDER — ALLOPURINOL 100 MG/1
TABLET ORAL
Qty: 180 TABLET | Refills: 0 | Status: SHIPPED | OUTPATIENT
Start: 2018-05-18 | End: 2018-09-13 | Stop reason: SDUPTHER

## 2018-05-20 ENCOUNTER — TELEPHONE (OUTPATIENT)
Dept: CARDIOLOGY | Facility: CLINIC | Age: 83
End: 2018-05-20

## 2018-05-20 NOTE — TELEPHONE ENCOUNTER
Please call pt.  Schedule B LE arterial u/s in Cardiology to get a better look at blockages in her legs, particularly below left knee.  Prior u/s in radiology had poor visualization.  Schedule f/u appt with me after the u/s is done.    Dr Trejo

## 2018-05-21 DIAGNOSIS — I73.9 PVD (PERIPHERAL VASCULAR DISEASE) WITH CLAUDICATION: Primary | ICD-10-CM

## 2018-05-21 NOTE — TELEPHONE ENCOUNTER
Spoke with pt daughter Violetta notified her the information. I let pt daughter know I will call back to schedule u/s once order is placed. Pt daughter voiced understanding.

## 2018-05-24 ENCOUNTER — PATIENT MESSAGE (OUTPATIENT)
Dept: CARDIOLOGY | Facility: CLINIC | Age: 83
End: 2018-05-24

## 2018-05-30 ENCOUNTER — PATIENT MESSAGE (OUTPATIENT)
Dept: CARDIOLOGY | Facility: CLINIC | Age: 83
End: 2018-05-30

## 2018-05-30 ENCOUNTER — PATIENT MESSAGE (OUTPATIENT)
Dept: INTERNAL MEDICINE | Facility: CLINIC | Age: 83
End: 2018-05-30

## 2018-06-17 ENCOUNTER — PATIENT MESSAGE (OUTPATIENT)
Dept: INTERNAL MEDICINE | Facility: CLINIC | Age: 83
End: 2018-06-17

## 2018-06-21 ENCOUNTER — PATIENT OUTREACH (OUTPATIENT)
Dept: ADMINISTRATIVE | Facility: HOSPITAL | Age: 83
End: 2018-06-21

## 2018-06-21 ENCOUNTER — PATIENT MESSAGE (OUTPATIENT)
Dept: INTERNAL MEDICINE | Facility: CLINIC | Age: 83
End: 2018-06-21

## 2018-06-29 ENCOUNTER — PATIENT MESSAGE (OUTPATIENT)
Dept: INTERNAL MEDICINE | Facility: CLINIC | Age: 83
End: 2018-06-29

## 2018-06-29 RX ORDER — CLONAZEPAM 1 MG/1
TABLET ORAL
Qty: 45 TABLET | Refills: 0 | Status: SHIPPED | OUTPATIENT
Start: 2018-06-29 | End: 2018-07-30 | Stop reason: SDUPTHER

## 2018-07-05 ENCOUNTER — OFFICE VISIT (OUTPATIENT)
Dept: INTERNAL MEDICINE | Facility: CLINIC | Age: 83
End: 2018-07-05
Payer: MEDICARE

## 2018-07-05 ENCOUNTER — HOSPITAL ENCOUNTER (OUTPATIENT)
Dept: RADIOLOGY | Facility: HOSPITAL | Age: 83
Discharge: HOME | End: 2018-07-05
Attending: FAMILY MEDICINE
Payer: MEDICARE

## 2018-07-05 VITALS
BODY MASS INDEX: 14.42 KG/M2 | OXYGEN SATURATION: 88 % | HEART RATE: 78 BPM | HEIGHT: 63 IN | SYSTOLIC BLOOD PRESSURE: 104 MMHG | TEMPERATURE: 99 F | WEIGHT: 81.38 LBS | DIASTOLIC BLOOD PRESSURE: 62 MMHG

## 2018-07-05 DIAGNOSIS — I25.10 CORONARY ARTERY DISEASE DUE TO LIPID RICH PLAQUE: Chronic | ICD-10-CM

## 2018-07-05 DIAGNOSIS — I25.83 CORONARY ARTERY DISEASE DUE TO LIPID RICH PLAQUE: Chronic | ICD-10-CM

## 2018-07-05 DIAGNOSIS — I10 ESSENTIAL HYPERTENSION: Primary | Chronic | ICD-10-CM

## 2018-07-05 DIAGNOSIS — R05.9 COUGH: ICD-10-CM

## 2018-07-05 DIAGNOSIS — F32.5 MAJOR DEPRESSIVE DISORDER WITH SINGLE EPISODE, IN REMISSION: ICD-10-CM

## 2018-07-05 PROCEDURE — 99999 PR PBB SHADOW E&M-EST. PATIENT-LVL III: CPT | Mod: PBBFAC,,, | Performed by: FAMILY MEDICINE

## 2018-07-05 PROCEDURE — 99214 OFFICE O/P EST MOD 30 MIN: CPT | Mod: S$GLB,,, | Performed by: FAMILY MEDICINE

## 2018-07-05 PROCEDURE — 71046 X-RAY EXAM CHEST 2 VIEWS: CPT | Mod: TC,FY,PO

## 2018-07-05 PROCEDURE — 71046 X-RAY EXAM CHEST 2 VIEWS: CPT | Mod: 26,,, | Performed by: RADIOLOGY

## 2018-07-05 RX ORDER — AMOXICILLIN AND CLAVULANATE POTASSIUM 875; 125 MG/1; MG/1
1 TABLET, FILM COATED ORAL 2 TIMES DAILY
Qty: 20 TABLET | Refills: 0 | Status: SHIPPED | OUTPATIENT
Start: 2018-07-05 | End: 2018-07-18 | Stop reason: ALTCHOICE

## 2018-07-05 RX ORDER — HYDROCODONE BITARTRATE AND ACETAMINOPHEN 10; 325 MG/1; MG/1
TABLET ORAL
Refills: 0 | COMMUNITY
Start: 2018-06-16 | End: 2018-07-18

## 2018-07-05 RX ORDER — AMLODIPINE BESYLATE 5 MG/1
TABLET ORAL
Refills: 0 | COMMUNITY
Start: 2018-06-18 | End: 2018-07-18

## 2018-07-05 RX ORDER — BENZONATATE 200 MG/1
200 CAPSULE ORAL 3 TIMES DAILY PRN
Qty: 30 CAPSULE | Refills: 0 | Status: SHIPPED | OUTPATIENT
Start: 2018-07-05 | End: 2018-07-15

## 2018-07-05 NOTE — PROGRESS NOTES
Subjective:       Patient ID: Anju Rivera is a 88 y.o. female.    Chief Complaint: No chief complaint on file.    F/U:      Pt is a 88 year old her for follow-up. Pt BP is running low and is on Amlodipine 5 mg. Pt also having some increase cough without any sinus congestion. Pt does have HF but no lower extremity swelling. Pt has been recently in rehab and hospital.      Review of Systems   Constitutional: Negative.    Respiratory: Negative.    Cardiovascular: Negative.    Genitourinary: Negative.    Musculoskeletal: Negative.    Neurological: Negative.    Hematological: Negative.        Objective:      Physical Exam   Constitutional: She is oriented to person, place, and time. She appears well-developed and well-nourished.   Neck: Normal range of motion. Neck supple.   Cardiovascular: Normal rate and regular rhythm.  Exam reveals no friction rub.    No murmur heard.  Pulmonary/Chest: Effort normal. She has wheezes.   Abdominal: Soft. Bowel sounds are normal.   Musculoskeletal: Normal range of motion.   Neurological: She is alert and oriented to person, place, and time.   Skin: Skin is warm and dry.       Assessment:       1. Essential hypertension    2. Coronary artery disease due to lipid rich plaque    3. Major depressive disorder with single episode, in remission    4. Cough        Plan:       Essential hypertension  Comments:  Will stop the amlodipine 5 mg a day    Coronary artery disease due to lipid rich plaque  Comments:  Stable    Major depressive disorder with single episode, in remission  Comments:  Will continue on the celexa    Cough  Comments:  Will do xray and start on augmentin  Orders:  -     X-Ray Chest PA And Lateral; Future; Expected date: 07/05/2018    Other orders  -     amoxicillin-clavulanate 875-125mg (AUGMENTIN) 875-125 mg per tablet; Take 1 tablet by mouth 2 (two) times daily.  Dispense: 20 tablet; Refill: 0  -     benzonatate (TESSALON) 200 MG capsule; Take 1 capsule (200 mg total)  by mouth 3 (three) times daily as needed for Cough.  Dispense: 30 capsule; Refill: 0

## 2018-07-12 ENCOUNTER — OFFICE VISIT (OUTPATIENT)
Dept: OPHTHALMOLOGY | Facility: CLINIC | Age: 83
End: 2018-07-12
Payer: MEDICARE

## 2018-07-12 DIAGNOSIS — Z96.1 PSEUDOPHAKIA OF BOTH EYES: ICD-10-CM

## 2018-07-12 DIAGNOSIS — H40.1133 PRIMARY OPEN ANGLE GLAUCOMA OF BOTH EYES, SEVERE STAGE: Primary | ICD-10-CM

## 2018-07-12 DIAGNOSIS — H04.129 DRY EYE: ICD-10-CM

## 2018-07-12 PROCEDURE — 99999 PR PBB SHADOW E&M-EST. PATIENT-LVL II: CPT | Mod: PBBFAC,,, | Performed by: OPHTHALMOLOGY

## 2018-07-12 PROCEDURE — 92012 INTRM OPH EXAM EST PATIENT: CPT | Mod: S$GLB,,, | Performed by: OPHTHALMOLOGY

## 2018-07-12 NOTE — PROGRESS NOTES
SUBJECTIVE:   Anju Rivera is a 88 y.o. female   Corrected distance visual acuity was 20/40 -2 in the right eye and 20/30 +2 in the left eye.   Chief Complaint   Patient presents with    Glaucoma     4m IOP chk        HPI:  HPI     Glaucoma    Additional comments: 4m IOP chk           Comments   Pt here for 4m IOP chk. No pain or discomfort. VA stable. 100% compliant   with gtts.     1. PCIOL OU  2. Severe Coag with enlarged cups (init 38/18) Goal < 16  Vf progression OD 11/17 add Brimonidine  3. PVD OD  4. H/O narrow angles  5. Blepharitis  6 YUNG OU    Brimonidine-D/C due to drowsiness,redness,irritation   Latanoprost qhs OU  Systane       Last edited by Milton Rene, Patient Care Assistant on 7/12/2018 10:42   AM. (History)        Assessment /Plan :  1. Primary open angle glaucoma of both eyes, severe stage Doing well, IOP within acceptable range relative to target IOP and no evidence of progression. Continue current treatment. Reviewed importance of continued compliance with treatment and follow up.  Add Rhopressa OU qhs   2. Pseudophakia of both eyes  -- Condition stable, no therapeutic change required. Monitoring routinely.     3. Dry eye  -- Condition stable, no therapeutic change required. Monitoring routinely.       Return to clinic in 2-3 weeks  or as needed.  With IOP Check (Rhopressa trial)

## 2018-07-15 ENCOUNTER — PATIENT MESSAGE (OUTPATIENT)
Dept: INTERNAL MEDICINE | Facility: CLINIC | Age: 83
End: 2018-07-15

## 2018-07-16 DIAGNOSIS — R52 PAIN: ICD-10-CM

## 2018-07-16 RX ORDER — HYDROCODONE BITARTRATE AND ACETAMINOPHEN 10; 325 MG/1; MG/1
TABLET ORAL
Refills: 0 | Status: CANCELLED | OUTPATIENT
Start: 2018-07-16

## 2018-07-16 RX ORDER — HYDROCODONE BITARTRATE AND ACETAMINOPHEN 5; 325 MG/1; MG/1
TABLET ORAL
Qty: 75 TABLET | Refills: 0 | Status: SHIPPED | OUTPATIENT
Start: 2018-07-16 | End: 2018-08-24 | Stop reason: SDUPTHER

## 2018-07-18 ENCOUNTER — CLINICAL SUPPORT (OUTPATIENT)
Dept: CARDIOLOGY | Facility: CLINIC | Age: 83
End: 2018-07-18
Attending: INTERNAL MEDICINE
Payer: MEDICARE

## 2018-07-18 ENCOUNTER — OFFICE VISIT (OUTPATIENT)
Dept: INTERNAL MEDICINE | Facility: CLINIC | Age: 83
End: 2018-07-18
Payer: MEDICARE

## 2018-07-18 VITALS
TEMPERATURE: 100 F | HEART RATE: 91 BPM | BODY MASS INDEX: 14.76 KG/M2 | WEIGHT: 83.31 LBS | DIASTOLIC BLOOD PRESSURE: 66 MMHG | HEIGHT: 63 IN | SYSTOLIC BLOOD PRESSURE: 126 MMHG | OXYGEN SATURATION: 91 %

## 2018-07-18 DIAGNOSIS — I73.9 PVD (PERIPHERAL VASCULAR DISEASE) WITH CLAUDICATION: ICD-10-CM

## 2018-07-18 DIAGNOSIS — J18.9 PNEUMONIA OF LEFT LOWER LOBE DUE TO INFECTIOUS ORGANISM: Primary | ICD-10-CM

## 2018-07-18 PROCEDURE — 93925 LOWER EXTREMITY STUDY: CPT | Mod: S$GLB,,, | Performed by: INTERNAL MEDICINE

## 2018-07-18 PROCEDURE — 99499 UNLISTED E&M SERVICE: CPT | Mod: HCNC,S$GLB,, | Performed by: FAMILY MEDICINE

## 2018-07-18 PROCEDURE — 99999 PR PBB SHADOW E&M-EST. PATIENT-LVL III: CPT | Mod: PBBFAC,,, | Performed by: FAMILY MEDICINE

## 2018-07-18 PROCEDURE — 99213 OFFICE O/P EST LOW 20 MIN: CPT | Mod: S$GLB,,, | Performed by: FAMILY MEDICINE

## 2018-07-18 NOTE — PROGRESS NOTES
Subjective:       Patient ID: Anju Rivera is a 88 y.o. female.    Chief Complaint: Follow-up    F/U:         Pt is a 88 year old who is here for f/u of pneumonia. Pt is feeling better still having some mild cough. No temp at 100.4 but 99.      Review of Systems   Constitutional: Negative.    Respiratory: Positive for cough.    Cardiovascular: Negative.    Genitourinary: Negative.    Neurological: Negative.        Objective:      Physical Exam   Constitutional: She is oriented to person, place, and time. She appears well-developed and well-nourished.   Cardiovascular: Normal rate and regular rhythm.    Pulmonary/Chest: Effort normal. She has wheezes (mild).   Abdominal: Soft. Bowel sounds are normal.   Neurological: She is alert and oriented to person, place, and time.   Psychiatric: She has a normal mood and affect. Her behavior is normal.       Assessment:       1. Pneumonia of left lower lobe due to infectious organism        Plan:       Pneumonia of left lower lobe due to infectious organism  Comments:  Feeling better. repeat xray in 2 weeks.  Orders:  -     X-Ray Chest PA And Lateral; Future; Expected date: 08/02/2018

## 2018-07-31 ENCOUNTER — CLINICAL SUPPORT (OUTPATIENT)
Dept: INTERNAL MEDICINE | Facility: CLINIC | Age: 83
End: 2018-07-31
Payer: MEDICARE

## 2018-07-31 ENCOUNTER — TELEPHONE (OUTPATIENT)
Dept: INTERNAL MEDICINE | Facility: CLINIC | Age: 83
End: 2018-07-31

## 2018-07-31 PROCEDURE — 99999 PR PBB SHADOW E&M-EST. PATIENT-LVL II: CPT | Mod: PBBFAC,,,

## 2018-07-31 PROCEDURE — 86580 TB INTRADERMAL TEST: CPT | Mod: S$GLB,,, | Performed by: FAMILY MEDICINE

## 2018-07-31 RX ORDER — CLONAZEPAM 1 MG/1
TABLET ORAL
Qty: 45 TABLET | Refills: 0 | Status: SHIPPED | OUTPATIENT
Start: 2018-07-31 | End: 2018-08-30 | Stop reason: SDUPTHER

## 2018-08-02 ENCOUNTER — OFFICE VISIT (OUTPATIENT)
Dept: OPHTHALMOLOGY | Facility: CLINIC | Age: 83
End: 2018-08-02
Payer: MEDICARE

## 2018-08-02 ENCOUNTER — PATIENT MESSAGE (OUTPATIENT)
Dept: INTERNAL MEDICINE | Facility: CLINIC | Age: 83
End: 2018-08-02

## 2018-08-02 ENCOUNTER — HOSPITAL ENCOUNTER (OUTPATIENT)
Dept: RADIOLOGY | Facility: HOSPITAL | Age: 83
Discharge: HOME | End: 2018-08-02
Attending: FAMILY MEDICINE
Payer: MEDICARE

## 2018-08-02 DIAGNOSIS — Z96.1 PSEUDOPHAKIA OF BOTH EYES: ICD-10-CM

## 2018-08-02 DIAGNOSIS — H04.129 DRY EYE: ICD-10-CM

## 2018-08-02 DIAGNOSIS — H40.1133 PRIMARY OPEN ANGLE GLAUCOMA OF BOTH EYES, SEVERE STAGE: Primary | ICD-10-CM

## 2018-08-02 DIAGNOSIS — J18.9 PNEUMONIA OF LEFT LOWER LOBE DUE TO INFECTIOUS ORGANISM: ICD-10-CM

## 2018-08-02 PROCEDURE — 92012 INTRM OPH EXAM EST PATIENT: CPT | Mod: S$GLB,,, | Performed by: OPHTHALMOLOGY

## 2018-08-02 PROCEDURE — 99999 PR PBB SHADOW E&M-EST. PATIENT-LVL I: CPT | Mod: PBBFAC,,, | Performed by: OPHTHALMOLOGY

## 2018-08-02 PROCEDURE — 71046 X-RAY EXAM CHEST 2 VIEWS: CPT | Mod: TC,FY,PO

## 2018-08-02 PROCEDURE — 71046 X-RAY EXAM CHEST 2 VIEWS: CPT | Mod: 26,,, | Performed by: RADIOLOGY

## 2018-08-02 NOTE — PROGRESS NOTES
SUBJECTIVE:   Anju Rivera is a 89 y.o. female   Corrected distance visual acuity was 20/50 +1 in the right eye and 20/30 -2 in the left eye. Comments: Through phoropter.   Chief Complaint   Patient presents with    Glaucoma        HPI:  HPI     Pt here for 3 wk Rhopressa trial chk. Pt states that the eye drop burns a   little bit when she puts them in. 100% compliant with gtts.     1. PCIOL OU  2. Severe Coag with enlarged cups (init 38/18) Goal < 16  Vf progression OD 11/17 add Brimonidine  Brimonidine-D/C due to drowsiness,redness,irritation   3. PVD OD  4. H/O narrow angles  5. Blepharitis  6 YUNG OU    Latanoprost qhs OU  Rhopressa QHS OU  Systane    Last edited by Milton Rene, Patient Care Assistant on 8/2/2018 10:44   AM. (History)        Assessment /Plan :  1. Primary open angle glaucoma of both eyes, severe stage good response to rhopressa. Will continue    Doing well, IOP within acceptable range relative to target IOP and no evidence of progression. Continue current treatment. Reviewed importance of continued compliance with treatment and follow up.     2. Pseudophakia of both eyes stable.    3. Dry eye continue systane       Return to clinic in 3 months  or as needed.  With 24-2 HVF, Dilation and SDP's

## 2018-08-02 NOTE — PROGRESS NOTES
"Patient arrived to clinic ambulating via wheelchair with assist from female . Patient AAOx3. Reviewed patient's allergies and current medications. PPD successfully placed in patient's left arm. Patient tolerated well with teardrop-sized wheal noted to site. Patient provided with 2x2 gauze to wipe droplet of blood from site. Patient advised to avoid rubbing, pushing, scratching, or otherwise vigorously pressing site. Patient visualized pressing and rubbing site vigorously. Site where PPD placed noted to be purple-colored and approximately the size of 2 nickels on visual inspection. Patient verbalized she had rubbed and pressed site vigorously after PPD placement, stating "I bruise easily." Patient denied pain or discomfort. NICOLE Lorenzo LPN, visualized site also to ensure second PPD not necessary. Wheal visualized. Patient advised to refrain from further rubbing, pressing, scratching, or pushing to site. Patient had also stated she often applies lotion to skin. NICOLE Lorenzo LPN, advised patient to refrain from applying lotion to site. Patient verbalized understanding of all information provided by stating, "Okay."    Patient was given follow-up appointment as per below based on patient preference and per PPD read parameters for PPD read:    Future Appointments  Date Time Provider Department Center   8/2/2018 10:45 AM Damian Verduzco MD [facility] OPHTHAL [place]   8/2/2018 11:30 AM [facility] XR2 [facility] XRAY [place]   8/3/2018 11:00 AM NURSE SCHEDULE, SINAI AHMADI [facility] IM [place]   8/27/2018 3:40 PM Ander Trejo MD [facility] CARDIO [place]     Patient left clinic exam room via wheelchair with assist from female .  "

## 2018-08-03 ENCOUNTER — CLINICAL SUPPORT (OUTPATIENT)
Dept: INTERNAL MEDICINE | Facility: CLINIC | Age: 83
End: 2018-08-03
Payer: MEDICARE

## 2018-08-03 NOTE — PROGRESS NOTES
Patient presents to clinic for TB reading. Patients TB test was placed on her left arm. PPD was negative with 0 mm induration.

## 2018-08-08 ENCOUNTER — TELEPHONE (OUTPATIENT)
Dept: INTERNAL MEDICINE | Facility: CLINIC | Age: 83
End: 2018-08-08

## 2018-08-08 DIAGNOSIS — I10 ESSENTIAL HYPERTENSION: Primary | Chronic | ICD-10-CM

## 2018-08-08 NOTE — TELEPHONE ENCOUNTER
----- Message from Felicia Nice sent at 8/8/2018 11:51 AM CDT -----  Contact: elvia/daughter  Caller returning call [phone]

## 2018-08-08 NOTE — TELEPHONE ENCOUNTER
Patient is having weakness and shaking when walking, jerking in the arms, also complains of lightheadedness.  Patient will be going to respit care in a week.  Please advise.  Home health nurse is also suggesing a BMP ([facility] will draw it with an order)

## 2018-08-08 NOTE — TELEPHONE ENCOUNTER
----- Message from Jose Ramon Delgaod MD sent at 8/8/2018 12:41 AM CDT -----  Contact: Erica - [facility]  Pt needs evaluation.  ----- Message -----  From: Evelina Montana LPN  Sent: 8/7/2018   4:44 PM  To: Jose Ramon Delgado MD        ----- Message -----  From: Irma Crook  Sent: 8/7/2018   4:19 PM  To: Danny FERRER Staff    States a BMP is needed on this pt due to increase weakness ans shaking, can be reached at  [phone]///[email or web address]

## 2018-08-10 ENCOUNTER — TELEPHONE (OUTPATIENT)
Dept: OPHTHALMOLOGY | Facility: CLINIC | Age: 83
End: 2018-08-10

## 2018-08-10 ENCOUNTER — LAB VISIT (OUTPATIENT)
Dept: LAB | Facility: HOSPITAL | Age: 83
End: 2018-08-10
Attending: FAMILY MEDICINE
Payer: MEDICARE

## 2018-08-10 ENCOUNTER — PATIENT MESSAGE (OUTPATIENT)
Dept: OPHTHALMOLOGY | Facility: CLINIC | Age: 83
End: 2018-08-10

## 2018-08-10 DIAGNOSIS — I25.10 CORONARY ATHEROSCLEROSIS OF NATIVE CORONARY ARTERY: ICD-10-CM

## 2018-08-10 DIAGNOSIS — H40.1133 PRIMARY OPEN ANGLE GLAUCOMA OF BOTH EYES, SEVERE STAGE: ICD-10-CM

## 2018-08-10 DIAGNOSIS — S72.111D CLOSED DISPLACED FRACTURE OF GREATER TROCHANTER OF RIGHT FEMUR WITH ROUTINE HEALING: Primary | ICD-10-CM

## 2018-08-10 DIAGNOSIS — J44.89 OBSTRUCTIVE CHRONIC BRONCHITIS WITHOUT EXACERBATION: ICD-10-CM

## 2018-08-10 LAB
ALBUMIN SERPL BCP-MCNC: 3.6 G/DL
ALP SERPL-CCNC: 72 U/L
ALT SERPL W/O P-5'-P-CCNC: 15 U/L
ANION GAP SERPL CALC-SCNC: 9 MMOL/L
AST SERPL-CCNC: 25 U/L
BILIRUB SERPL-MCNC: 0.4 MG/DL
BUN SERPL-MCNC: 37 MG/DL
CALCIUM SERPL-MCNC: 9.8 MG/DL
CHLORIDE SERPL-SCNC: 105 MMOL/L
CO2 SERPL-SCNC: 25 MMOL/L
CREAT SERPL-MCNC: 1.3 MG/DL
EST. GFR  (AFRICAN AMERICAN): 42 ML/MIN/1.73 M^2
EST. GFR  (NON AFRICAN AMERICAN): 36.5 ML/MIN/1.73 M^2
GLUCOSE SERPL-MCNC: 73 MG/DL
POTASSIUM SERPL-SCNC: 5 MMOL/L
PROT SERPL-MCNC: 6.1 G/DL
SODIUM SERPL-SCNC: 139 MMOL/L

## 2018-08-10 PROCEDURE — 80053 COMPREHEN METABOLIC PANEL: CPT

## 2018-08-10 NOTE — TELEPHONE ENCOUNTER
I submitted a prior authorization to [facility] through [employer] for Rhopressa, [place] case [id number]. Her authorization status is pending. Notification of outcome will be sent to [facility] in the next 24-72 hours. We should receive a fax with the outcome as well.

## 2018-08-13 ENCOUNTER — TELEPHONE (OUTPATIENT)
Dept: OPHTHALMOLOGY | Facility: CLINIC | Age: 83
End: 2018-08-13

## 2018-08-13 ENCOUNTER — PATIENT MESSAGE (OUTPATIENT)
Dept: OPHTHALMOLOGY | Facility: CLINIC | Age: 83
End: 2018-08-13

## 2018-08-14 ENCOUNTER — PATIENT MESSAGE (OUTPATIENT)
Dept: INTERNAL MEDICINE | Facility: CLINIC | Age: 83
End: 2018-08-14

## 2018-08-23 ENCOUNTER — TELEPHONE (OUTPATIENT)
Dept: ADMINISTRATIVE | Facility: CLINIC | Age: 83
End: 2018-08-23

## 2018-08-23 NOTE — TELEPHONE ENCOUNTER
Home Health Recert 08/15/2018 - 10/13/2018 with [facility] ([facility]) - Dr. Jose Ramon Delgado.  services.

## 2018-08-24 ENCOUNTER — PATIENT MESSAGE (OUTPATIENT)
Dept: INTERNAL MEDICINE | Facility: CLINIC | Age: 83
End: 2018-08-24

## 2018-08-24 DIAGNOSIS — R52 PAIN: ICD-10-CM

## 2018-08-27 ENCOUNTER — LAB VISIT (OUTPATIENT)
Dept: LAB | Facility: HOSPITAL | Age: 83
End: 2018-08-27
Attending: INTERNAL MEDICINE
Payer: MEDICARE

## 2018-08-27 ENCOUNTER — CLINICAL SUPPORT (OUTPATIENT)
Dept: CARDIOLOGY | Facility: CLINIC | Age: 83
End: 2018-08-27
Payer: MEDICARE

## 2018-08-27 ENCOUNTER — OFFICE VISIT (OUTPATIENT)
Dept: CARDIOLOGY | Facility: CLINIC | Age: 83
End: 2018-08-27
Payer: MEDICARE

## 2018-08-27 VITALS
BODY MASS INDEX: 14.76 KG/M2 | HEIGHT: 63 IN | SYSTOLIC BLOOD PRESSURE: 124 MMHG | DIASTOLIC BLOOD PRESSURE: 70 MMHG | HEART RATE: 71 BPM

## 2018-08-27 DIAGNOSIS — I49.3 PVC'S (PREMATURE VENTRICULAR CONTRACTIONS): ICD-10-CM

## 2018-08-27 DIAGNOSIS — E78.2 MIXED HYPERLIPIDEMIA: Chronic | ICD-10-CM

## 2018-08-27 DIAGNOSIS — I27.20 PULMONARY HYPERTENSION: Chronic | ICD-10-CM

## 2018-08-27 DIAGNOSIS — I25.83 CORONARY ARTERY DISEASE DUE TO LIPID RICH PLAQUE: Chronic | ICD-10-CM

## 2018-08-27 DIAGNOSIS — I25.10 CORONARY ARTERY DISEASE: ICD-10-CM

## 2018-08-27 DIAGNOSIS — I25.10 CORONARY ARTERY DISEASE DUE TO LIPID RICH PLAQUE: Chronic | ICD-10-CM

## 2018-08-27 DIAGNOSIS — I25.2 OLD MI (MYOCARDIAL INFARCTION): Chronic | ICD-10-CM

## 2018-08-27 DIAGNOSIS — I25.10 CORONARY ARTERY DISEASE, ANGINA PRESENCE UNSPECIFIED, UNSPECIFIED VESSEL OR LESION TYPE, UNSPECIFIED WHETHER NATIVE OR TRANSPLANTED HEART: ICD-10-CM

## 2018-08-27 DIAGNOSIS — I73.9 PERIPHERAL VASCULAR DISEASE: ICD-10-CM

## 2018-08-27 DIAGNOSIS — I34.0 NON-RHEUMATIC MITRAL REGURGITATION: Chronic | ICD-10-CM

## 2018-08-27 DIAGNOSIS — I50.32 CHRONIC DIASTOLIC HEART FAILURE: Chronic | ICD-10-CM

## 2018-08-27 DIAGNOSIS — I35.9 AORTIC VALVE DISORDER: Chronic | ICD-10-CM

## 2018-08-27 DIAGNOSIS — I77.9 BILATERAL CAROTID ARTERY DISEASE: ICD-10-CM

## 2018-08-27 DIAGNOSIS — R06.02 SHORTNESS OF BREATH: ICD-10-CM

## 2018-08-27 DIAGNOSIS — I10 ESSENTIAL HYPERTENSION: Primary | Chronic | ICD-10-CM

## 2018-08-27 PROCEDURE — 99499 UNLISTED E&M SERVICE: CPT | Mod: S$GLB,,, | Performed by: INTERNAL MEDICINE

## 2018-08-27 PROCEDURE — 99214 OFFICE O/P EST MOD 30 MIN: CPT | Mod: S$GLB,,, | Performed by: INTERNAL MEDICINE

## 2018-08-27 PROCEDURE — 93000 ELECTROCARDIOGRAM COMPLETE: CPT | Mod: S$GLB,,, | Performed by: INTERNAL MEDICINE

## 2018-08-27 PROCEDURE — 83880 ASSAY OF NATRIURETIC PEPTIDE: CPT

## 2018-08-27 PROCEDURE — 36415 COLL VENOUS BLD VENIPUNCTURE: CPT | Mod: PO

## 2018-08-27 PROCEDURE — 80053 COMPREHEN METABOLIC PANEL: CPT

## 2018-08-27 PROCEDURE — 99999 PR PBB SHADOW E&M-EST. PATIENT-LVL III: CPT | Mod: PBBFAC,,, | Performed by: INTERNAL MEDICINE

## 2018-08-27 RX ORDER — HYDROCODONE BITARTRATE AND ACETAMINOPHEN 5; 325 MG/1; MG/1
TABLET ORAL
Qty: 75 TABLET | Refills: 0 | Status: SHIPPED | OUTPATIENT
Start: 2018-08-27 | End: 2018-10-05 | Stop reason: SDUPTHER

## 2018-08-27 NOTE — PROGRESS NOTES
Subjective:    Patient ID:  Anju Rivera is a 89 y.o. female who presents for evaluation of Coronary Artery Disease; Hypertension; Hyperlipidemia; Peripheral Vascular Disease; Congestive Heart Failure; and Peripheral Arterial Disease      HPI Mrs. Rivera returns for follow-up.    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   Negative stress mpi 2/16.  Echo 2/16 showed normal LVEF, mild AS, mild PHTN.  She had normal diastolic function on echo.  Pt here for f/u.  When I saw her several months ago, she was evaluated for PAD.  She had B LE arterial u/s done 4/18 with PAD noted but no hemodynamically significant stenosis per official report summary.  PCP sent to podiatry for red left big toe. She has chronic discoloration of her feet and restless leg syndrome.  Feet discolored x 5 years.  Repeat vascular studies last month shows B LE PAD, significantly worse in left leg, abnl TBI left side.  S/p pneumonia, recovering.  Pt is elderly frail pt.  H/o multiple falls and hip fractures.  ecg today shows NSR, LVH, possible anterior infarct, no acute changes noted.  Hs chronic dyspnea, on oxygen. Uses nebs/inhalers.  She has CHRISTINE with limited activity.  Her inhalers help.  She thinks her breathing is getting worse.  Denies chest pain sxs.  Her legs hurt sitting too long, at night.  She hurts all over as well at times.  She cannot walk much, uses wheelchair at home, limited ability to walk due to her frail elderly condition and fall risk.  Has labile BP issues at times, can spike up.      Current Outpatient Medications:     albuterol (VENTOLIN HFA) 90 mcg/actuation inhaler, Inhale 2 puffs into the lungs every 4 (four) hours as needed for Wheezing., Disp: 18 g, Rfl: 3     albuterol-ipratropium 2.5mg-0.5mg/3mL (DUO-NEB) 0.5 mg-3 mg(2.5 mg base)/3 mL nebulizer solution, USE 1 VIAL VIA NEBULIZER EVERY 6 TO 8 HOURS AS NEEDED FOR WHEEZING, Disp: 360 mL, Rfl: 11    allopurinol (ZYLOPRIM) 100 MG tablet, TAKE 2 TABLETS BY MOUTH ONCE DAILY, Disp: 180 tablet, Rfl: 0    aspirin (ECOTRIN) 81 MG EC tablet, Take 81 mg by mouth once daily., Disp: , Rfl:     CARBOXYMETHYLCELLULOSE SODIUM (REFRESH OPHT), Apply to eye., Disp: , Rfl:     citalopram (CELEXA) 40 MG tablet, TAKE 1 TABLET BY MOUTH DAILY, Disp: 136 tablet, Rfl: 2    clonazePAM (KLONOPIN) 1 MG tablet, TAKE 1 AND 1/2 TABLETS(1.5 MG) BY MOUTH EVERY DAY, Disp: 45 tablet, Rfl: 0    HYDROcodone-acetaminophen (NORCO) 5-325 mg per tablet, Take 1/2 to 1 tablet one time during the the day as needed and 1 1/2 tablet at night, Disp: 75 tablet, Rfl: 0    latanoprost 0.005 % ophthalmic solution, INSTILL 1 DROP IN EACH EYE EVERY EVENING, Disp: 2.5 mL, Rfl: 6    levothyroxine (SYNTHROID) 50 MCG tablet, Take 1 tablet (50 mcg total) by mouth once daily., Disp: 90 tablet, Rfl: 3    losartan (COZAAR) 50 MG tablet, TAKE 1 TABLET(50 MG) BY MOUTH EVERY DAY, Disp: 90 tablet, Rfl: 1    MULTIVITAMIN, Take 1 tablet by mouth Daily., Disp: , Rfl:     netarsudil (RHOPRESSA) 0.02 % Drop, Place 1 drop into both eyes every evening., Disp: 2.5 mL, Rfl: 11    SYMBICORT 80-4.5 mcg/actuation HFAA, INHALE 2 PUFFS INTO THE LUNGS TWICE DAILY. RINSE MOUTH AFTER USE, Disp: 10.2 g, Rfl: 11    Review of Systems   Constitution: Positive for weakness and malaise/fatigue.   HENT: Negative.    Eyes: Negative.    Cardiovascular: Positive for dyspnea on exertion.   Respiratory: Positive for cough and shortness of breath.    Endocrine: Negative.    Hematologic/Lymphatic: Negative.    Skin: Negative.    Musculoskeletal: Positive for arthritis and joint pain.   Gastrointestinal: Negative.    Genitourinary: Negative.    Neurological: Positive for loss of balance and tremors.  "  Psychiatric/Behavioral: Negative.    Allergic/Immunologic: Negative.        /70   Pulse 71   Ht 5' 3" (1.6 m)   BMI 14.76 kg/m²     Wt Readings from Last 3 Encounters:   07/18/18 37.8 kg (83 lb 5.3 oz)   07/05/18 36.9 kg (81 lb 5.6 oz)   05/16/18 38.8 kg (85 lb 8.6 oz)     Temp Readings from Last 3 Encounters:   07/18/18 99.9 °F (37.7 °C) (Tympanic)   07/05/18 99 °F (37.2 °C) (Tympanic)   05/16/18 99.2 °F (37.3 °C) (Tympanic)     BP Readings from Last 3 Encounters:   08/27/18 124/70   07/18/18 126/66   07/05/18 104/62     Pulse Readings from Last 3 Encounters:   08/27/18 71   07/18/18 91   07/05/18 78          Objective:    Physical Exam   Constitutional: She is oriented to person, place, and time. Vital signs are normal. She appears well-developed. She is cooperative. She does not have a sickly appearance. She does not appear ill. No distress.   HENT:   Head: Normocephalic.   Neck: Neck supple. No JVD present. Carotid bruit is not present. No thyromegaly present.   Cardiovascular: Normal rate, regular rhythm, S1 normal, S2 normal and normal pulses. PMI is not displaced. Exam reveals no gallop and no friction rub.   Murmur heard.   Medium-pitched midsystolic murmur is present with a grade of 2/6 at the lower left sternal border.  Pulses:       Radial pulses are 2+ on the right side, and 2+ on the left side.   Pulmonary/Chest: Effort normal and breath sounds normal. She has no wheezes. She has no rales.   Abdominal: Soft. Normal appearance, normal aorta and bowel sounds are normal. She exhibits no abdominal bruit. There is no tenderness.   Musculoskeletal: She exhibits no edema.   Lymphadenopathy:     She has no cervical adenopathy.   Neurological: She is alert and oriented to person, place, and time.   Skin: Skin is warm. She is not diaphoretic.   Psychiatric: She has a normal mood and affect. Her behavior is normal.   Nursing note and vitals reviewed.      I have reviewed all pertinent labs and cardiac " studies.      Chemistry        Component Value Date/Time     08/10/2018 1303    K 5.0 08/10/2018 1303     08/10/2018 1303    CO2 25 08/10/2018 1303    BUN 37 (H) 08/10/2018 1303    CREATININE 1.3 08/10/2018 1303    GLU 73 08/10/2018 1303        Component Value Date/Time    CALCIUM 9.8 08/10/2018 1303    ALKPHOS 72 08/10/2018 1303    AST 25 08/10/2018 1303    ALT 15 08/10/2018 1303    BILITOT 0.4 08/10/2018 1303    ESTGFRAFRICA 42.0 (A) 08/10/2018 1303    EGFRNONAA 36.5 (A) 08/10/2018 1303        Lab Results   Component Value Date    WBC 5.77 10/19/2017    HGB 10.2 (L) 10/19/2017    HCT 32.9 (L) 10/19/2017    MCV 99 (H) 10/19/2017     10/19/2017     Lab Results   Component Value Date    HGBA1C 5.5 03/17/2008     Lab Results   Component Value Date    CHOL 189 04/17/2018    CHOL 179 08/01/2016    CHOL 185 03/18/2014     Lab Results   Component Value Date    HDL 80 (H) 04/17/2018    HDL 61 08/01/2016    HDL 72 03/18/2014     Lab Results   Component Value Date    LDLCALC 100.6 04/17/2018    LDLCALC 96.2 08/01/2016    LDLCALC 99.6 03/18/2014     Lab Results   Component Value Date    TRIG 42 04/17/2018    TRIG 109 08/01/2016    TRIG 67 03/18/2014     Lab Results   Component Value Date    CHOLHDL 42.3 04/17/2018    CHOLHDL 34.1 08/01/2016    CHOLHDL 38.9 03/18/2014     Date of Procedure: 07/18/2018        TEST DESCRIPTION   RIGHT   cm/sec,  plaque   cm/sec  SFAo 212 cm/sec,  hemodynamic significant stenosis  SFAp 65 cm/sec  SFAm 58 cm/sec  SFAd 96 cm/sec   cm/sec,  plaque  TIB TRNK 34 cm/sec   cm/sec   cm/sec  PT  cm/sec,  occlusion    The right KESHA is .88    LEFT   cm/sec  PFA 61 cm/sec  SFAo 172 cm/sec  SFAp 239 cm/sec,  hemodynamic significant stenosis  SFAm 77 cm/sec  SFAd 107 cm/sec  POP 68 cm/sec  TIB TRNK 146 cm/sec  PER  cm/sec,  occlusion  AT 75 cm/sec   cm/sec,  hemodynamic significant stenosis      The left KESHA is .69    the rt kesha is suggestive of  mild  disease the left kesha is suggestive of moderate disease  there is diffuise calcification with triphasic waveforms in the cfa sfa and popliteals bilaterally.there is 50-99% stenosis of the rt sfa ostium and left mid sfa. the rt pta is occluded the left pta has a hemodynamically significant stenosis 50-99% range   the left peroneal is occluded        This document has been electronically    SIGNED BY: Kaykay Kaur MD On: 07/18/2018 17:42        Narrative     Date of Procedure: 05/16/2018    PRE-TEST DATA   Resting KESHA:    The right ankle brachial index was 0.86 which suggests mild right lower extremity arterial disease.   The left ankle brachial index was 0.66 which suggests moderate left lower extremity arterial disease.   The right TBI is 0.57.   The left TBI is 0.18.             This document has been electronically    SIGNED BY: Kash Magdaleno MD On: 05/16/2018 15:45           Assessment:       1. Essential hypertension    2. Pulmonary hypertension    3. Aortic valve disorder    4. Mixed hyperlipidemia    5. HX of MI    6. Peripheral vascular disease    7. Non-rheumatic mitral regurgitation    8. Coronary artery disease due to lipid rich plaque    9. Chronic diastolic heart failure    10. Bilateral carotid artery disease    11. Shortness of breath    12. PVC's (premature ventricular contractions)    13. Coronary artery disease, angina presence unspecified, unspecified vessel or lesion type, unspecified whether native or transplanted heart         Plan:             Severe chronic lung disease.  Elderly frail female with h/o diastolic CHF.  Echocardiogram to reassess LV EF and diastolic function.  CMP  BNP  Consider restarting diuretics if needed.  Severe PAD L LE with severely abnormal TBI.  She is weak, elderly female with higher risk of complications for revascularization.  She is at continued risk of leg ischemia, ulcers, amputation, etc.  Continue ASA qd.  Will not use pletal due to CHF.  Will seek vascular  surgery input.  Continue other meds.    Cardiac diet    Phone review for results.

## 2018-08-28 ENCOUNTER — TELEPHONE (OUTPATIENT)
Dept: CARDIOLOGY | Facility: HOSPITAL | Age: 83
End: 2018-08-28

## 2018-08-28 LAB
ALBUMIN SERPL BCP-MCNC: 3.9 G/DL
ALP SERPL-CCNC: 70 U/L
ALT SERPL W/O P-5'-P-CCNC: 14 U/L
ANION GAP SERPL CALC-SCNC: 12 MMOL/L
AST SERPL-CCNC: 26 U/L
BILIRUB SERPL-MCNC: 0.3 MG/DL
BNP SERPL-MCNC: 106 PG/ML
BUN SERPL-MCNC: 42 MG/DL
CALCIUM SERPL-MCNC: 9.8 MG/DL
CHLORIDE SERPL-SCNC: 107 MMOL/L
CO2 SERPL-SCNC: 24 MMOL/L
CREAT SERPL-MCNC: 1.6 MG/DL
EST. GFR  (AFRICAN AMERICAN): 32.7 ML/MIN/1.73 M^2
EST. GFR  (NON AFRICAN AMERICAN): 28.4 ML/MIN/1.73 M^2
GLUCOSE SERPL-MCNC: 81 MG/DL
POTASSIUM SERPL-SCNC: 5.5 MMOL/L
PROT SERPL-MCNC: 6.5 G/DL
SODIUM SERPL-SCNC: 143 MMOL/L

## 2018-08-28 NOTE — TELEPHONE ENCOUNTER
Please call pt  Heart failure blood test BNP is very good for her, minimally out of range.  Await echo results    Dr Joyce

## 2018-08-29 NOTE — TELEPHONE ENCOUNTER
Spoke with patient daughter, Violetta Mao, regarding BNP.  Advised that Heart Failure blood test, BNP, is minimally out of range; very good for her.  Daughter denies any questions/concerns.  Instructed to notify office should any questions/concerns arise.  Daughter verbalized understanding.

## 2018-08-29 NOTE — TELEPHONE ENCOUNTER
I have attempted without success to contact this patient by phone to give test results.  Left message to call office.

## 2018-08-31 RX ORDER — LEVOTHYROXINE SODIUM 50 UG/1
TABLET ORAL
Qty: 90 TABLET | Refills: 0 | Status: SHIPPED | OUTPATIENT
Start: 2018-08-31 | End: 2020-11-24 | Stop reason: SDUPTHER

## 2018-08-31 RX ORDER — CLONAZEPAM 1 MG/1
TABLET ORAL
Qty: 45 TABLET | Refills: 0 | Status: SHIPPED | OUTPATIENT
Start: 2018-08-31 | End: 2018-10-01 | Stop reason: SDUPTHER

## 2018-09-11 ENCOUNTER — CLINICAL SUPPORT (OUTPATIENT)
Dept: CARDIOLOGY | Facility: CLINIC | Age: 83
End: 2018-09-11
Attending: INTERNAL MEDICINE
Payer: MEDICARE

## 2018-09-11 ENCOUNTER — TELEPHONE (OUTPATIENT)
Dept: CARDIOLOGY | Facility: HOSPITAL | Age: 83
End: 2018-09-11

## 2018-09-11 DIAGNOSIS — E87.5 HYPERKALEMIA: Primary | ICD-10-CM

## 2018-09-11 DIAGNOSIS — I25.10 CORONARY ARTERY DISEASE, ANGINA PRESENCE UNSPECIFIED, UNSPECIFIED VESSEL OR LESION TYPE, UNSPECIFIED WHETHER NATIVE OR TRANSPLANTED HEART: ICD-10-CM

## 2018-09-11 DIAGNOSIS — I50.32 CHRONIC DIASTOLIC HEART FAILURE: Chronic | ICD-10-CM

## 2018-09-11 LAB
AORTIC VALVE STENOSIS: ABNORMAL
DIASTOLIC DYSFUNCTION: NO
ESTIMATED PA SYSTOLIC PRESSURE: 30.46
RETIRED EF AND QEF - SEE NOTES: 55 (ref 55–65)

## 2018-09-11 PROCEDURE — 93306 TTE W/DOPPLER COMPLETE: CPT | Mod: PBBFAC,PO | Performed by: INTERNAL MEDICINE

## 2018-09-12 NOTE — TELEPHONE ENCOUNTER
I have attempted without success to contact this patient by phone to give results left message to call clinic back will try again later.

## 2018-09-12 NOTE — TELEPHONE ENCOUNTER
Please call pt  Echo shows normal heart strength.  Needs repeat CMP in 2 weeks to reassess elevation of potassium noted on recent labs.  Make sure she is not taking any potassium pills.      Dr Trejo

## 2018-09-12 NOTE — TELEPHONE ENCOUNTER
The patient has been notified of this information and all questions answered. Voiced understand and labs schedule.

## 2018-09-13 RX ORDER — ALLOPURINOL 100 MG/1
TABLET ORAL
Qty: 180 TABLET | Refills: 1 | Status: SHIPPED | OUTPATIENT
Start: 2018-09-13 | End: 2019-03-17 | Stop reason: SDUPTHER

## 2018-09-19 ENCOUNTER — PATIENT MESSAGE (OUTPATIENT)
Dept: CARDIOLOGY | Facility: CLINIC | Age: 83
End: 2018-09-19

## 2018-10-01 ENCOUNTER — PATIENT MESSAGE (OUTPATIENT)
Dept: INTERNAL MEDICINE | Facility: CLINIC | Age: 83
End: 2018-10-01

## 2018-10-01 RX ORDER — CLONAZEPAM 1 MG/1
TABLET ORAL
Qty: 45 TABLET | Refills: 0 | Status: SHIPPED | OUTPATIENT
Start: 2018-10-01 | End: 2018-10-30 | Stop reason: SDUPTHER

## 2018-10-02 ENCOUNTER — LAB VISIT (OUTPATIENT)
Dept: LAB | Facility: HOSPITAL | Age: 83
End: 2018-10-02
Attending: INTERNAL MEDICINE
Payer: MEDICARE

## 2018-10-02 DIAGNOSIS — N18.30 CKD (CHRONIC KIDNEY DISEASE) STAGE 3, GFR 30-59 ML/MIN: ICD-10-CM

## 2018-10-02 DIAGNOSIS — E87.5 HYPERKALEMIA: ICD-10-CM

## 2018-10-02 LAB
ALBUMIN SERPL BCP-MCNC: 3.4 G/DL
ALBUMIN SERPL BCP-MCNC: 3.4 G/DL
ALP SERPL-CCNC: 76 U/L
ALT SERPL W/O P-5'-P-CCNC: 10 U/L
ANION GAP SERPL CALC-SCNC: 8 MMOL/L
ANION GAP SERPL CALC-SCNC: 8 MMOL/L
AST SERPL-CCNC: 22 U/L
BILIRUB SERPL-MCNC: 0.3 MG/DL
BUN SERPL-MCNC: 36 MG/DL
BUN SERPL-MCNC: 36 MG/DL
CALCIUM SERPL-MCNC: 9.5 MG/DL
CALCIUM SERPL-MCNC: 9.5 MG/DL
CHLORIDE SERPL-SCNC: 106 MMOL/L
CHLORIDE SERPL-SCNC: 106 MMOL/L
CO2 SERPL-SCNC: 25 MMOL/L
CO2 SERPL-SCNC: 25 MMOL/L
CREAT SERPL-MCNC: 1.2 MG/DL
CREAT SERPL-MCNC: 1.2 MG/DL
EST. GFR  (AFRICAN AMERICAN): 46.3 ML/MIN/1.73 M^2
EST. GFR  (AFRICAN AMERICAN): 46.3 ML/MIN/1.73 M^2
EST. GFR  (NON AFRICAN AMERICAN): 40.2 ML/MIN/1.73 M^2
EST. GFR  (NON AFRICAN AMERICAN): 40.2 ML/MIN/1.73 M^2
GLUCOSE SERPL-MCNC: 67 MG/DL
GLUCOSE SERPL-MCNC: 67 MG/DL
PHOSPHATE SERPL-MCNC: 3.8 MG/DL
POTASSIUM SERPL-SCNC: 4.3 MMOL/L
POTASSIUM SERPL-SCNC: 4.3 MMOL/L
PROT SERPL-MCNC: 5.8 G/DL
SODIUM SERPL-SCNC: 139 MMOL/L
SODIUM SERPL-SCNC: 139 MMOL/L

## 2018-10-02 PROCEDURE — 36415 COLL VENOUS BLD VENIPUNCTURE: CPT | Mod: PO

## 2018-10-02 PROCEDURE — 84100 ASSAY OF PHOSPHORUS: CPT

## 2018-10-02 PROCEDURE — 80053 COMPREHEN METABOLIC PANEL: CPT

## 2018-10-03 ENCOUNTER — TELEPHONE (OUTPATIENT)
Dept: CARDIOLOGY | Facility: HOSPITAL | Age: 83
End: 2018-10-03

## 2018-10-05 DIAGNOSIS — R52 PAIN: ICD-10-CM

## 2018-10-08 DIAGNOSIS — R52 PAIN: ICD-10-CM

## 2018-10-08 RX ORDER — HYDROCODONE BITARTRATE AND ACETAMINOPHEN 5; 325 MG/1; MG/1
TABLET ORAL
Qty: 75 TABLET | Refills: 0 | Status: SHIPPED | OUTPATIENT
Start: 2018-10-08 | End: 2018-10-09 | Stop reason: SDUPTHER

## 2018-10-08 RX ORDER — HYDROCODONE BITARTRATE AND ACETAMINOPHEN 5; 325 MG/1; MG/1
TABLET ORAL
Qty: 75 TABLET | Refills: 0 | OUTPATIENT
Start: 2018-10-08

## 2018-10-08 NOTE — TELEPHONE ENCOUNTER
----- Message from Darby Carmichael sent at 10/8/2018 10:11 AM CDT -----  Contact: daughter/Violetta Mao  States she has been trying to get a renewal on a prescription since 10/1 and it has not gone through to the pharmacy. States she can pick it up at [facility] on [facility], if the is a problem at [facility] on [address].    1. What is the name of the medication you are requesting? norco  2. What is the dose? 5/ 3.25 mg  3. How do you take the medication? Orally, topically, etc? orally  4. How often do you take this medication? At night before bed, 1 during the day  5. Do you need a 30 day or 90 day supply? ?  6. How many refills are you requesting? ?  7. What is your preferred pharmacy and location of the pharmacy? ?  8. Who can we contact with further questions? Violetta Mao at [phone]  Thank you

## 2018-10-09 ENCOUNTER — TELEPHONE (OUTPATIENT)
Dept: INTERNAL MEDICINE | Facility: CLINIC | Age: 83
End: 2018-10-09

## 2018-10-09 DIAGNOSIS — R52 PAIN: ICD-10-CM

## 2018-10-09 RX ORDER — HYDROCODONE BITARTRATE AND ACETAMINOPHEN 5; 325 MG/1; MG/1
TABLET ORAL
Qty: 75 TABLET | Refills: 0 | Status: SHIPPED | OUTPATIENT
Start: 2018-10-09 | End: 2018-11-07 | Stop reason: SDUPTHER

## 2018-10-09 NOTE — TELEPHONE ENCOUNTER
----- Message from Darby Carmichael sent at 10/9/2018 11:51 AM CDT -----  Contact: [facility] on [address]  States they haven't received the patients prescription for her norco and they would like us to resend it. Please call [place] at [phone]. Thank you

## 2018-10-22 ENCOUNTER — PATIENT MESSAGE (OUTPATIENT)
Dept: INTERNAL MEDICINE | Facility: CLINIC | Age: 83
End: 2018-10-22

## 2018-10-24 ENCOUNTER — PATIENT MESSAGE (OUTPATIENT)
Dept: INTERNAL MEDICINE | Facility: CLINIC | Age: 83
End: 2018-10-24

## 2018-10-26 ENCOUNTER — OFFICE VISIT (OUTPATIENT)
Dept: NEPHROLOGY | Facility: CLINIC | Age: 83
End: 2018-10-26
Payer: MEDICARE

## 2018-10-26 VITALS
BODY MASS INDEX: 14 KG/M2 | HEART RATE: 88 BPM | WEIGHT: 82 LBS | SYSTOLIC BLOOD PRESSURE: 116 MMHG | HEIGHT: 64 IN | DIASTOLIC BLOOD PRESSURE: 70 MMHG

## 2018-10-26 DIAGNOSIS — R80.9 PROTEINURIA, UNSPECIFIED TYPE: ICD-10-CM

## 2018-10-26 DIAGNOSIS — N18.30 CKD (CHRONIC KIDNEY DISEASE) STAGE 3, GFR 30-59 ML/MIN: Primary | ICD-10-CM

## 2018-10-26 PROCEDURE — 1101F PT FALLS ASSESS-DOCD LE1/YR: CPT | Mod: CPTII,,, | Performed by: INTERNAL MEDICINE

## 2018-10-26 PROCEDURE — 99213 OFFICE O/P EST LOW 20 MIN: CPT | Mod: S$PBB,,, | Performed by: INTERNAL MEDICINE

## 2018-10-26 PROCEDURE — 99213 OFFICE O/P EST LOW 20 MIN: CPT | Mod: PBBFAC,PO | Performed by: INTERNAL MEDICINE

## 2018-10-26 PROCEDURE — 99999 PR PBB SHADOW E&M-EST. PATIENT-LVL III: CPT | Mod: PBBFAC,,, | Performed by: INTERNAL MEDICINE

## 2018-10-26 NOTE — PROGRESS NOTES
PROGRESS NOTE FOR ESTABLISHED PATIENT    PHYSICIAN REQUESTING THE CONSULT: Dr. Gallardo     REASON FOR VISIT: Renal insufficiency     89 y.o. female with history of CAD, CHF, HTN, pulmonary HTN, COPD, arthritis, breast CA presents to the renal clinic for evaluation of renal insufficiency.     Patient denies any complaints today.       Past Medical History    Diagnosis  Date      Coronary artery disease  6/17/2013      Chronic diastolic heart failure  6/17/2013      Mitral regurgitation  6/17/2013      Hypertension  6/17/2013      Aortic valve disorder  6/17/2013      Pulmonary hypertension  6/17/2013      Sinus bradycardia  6/18/2013      COPD (chronic obstructive pulmonary disease)  6/17/2013      Arthritis       Emphysema of lung       copd      Thyroid disease       hypothyroidism      Pneumonia       dx 02/2012      MI (myocardial infarction)  05/1981      Breast cancer  1981      Glaucoma       Cataract       Skin cancer       Past Surgical History    Procedure  Date      Masectomy  1996      right breast      Finger amputation       digits 4, 5      Toe amputation       left foot great hallux      Wrist arthroplasty       left wrist      Orif hip fracture  11/2012      Eye surgery       right cataract removal. lens replacement      Cataract extraction       Allergies    Allergen  Reactions      Atorvastatin  Other (See Comments)      Dry mouth      Lactose  Diarrhea      Current Outpatient Medications   Medication Sig Dispense Refill    albuterol (VENTOLIN HFA) 90 mcg/actuation inhaler Inhale 2 puffs into the lungs every 4 (four) hours as needed for Wheezing. 18 g 3    albuterol-ipratropium 2.5mg-0.5mg/3mL (DUO-NEB) 0.5 mg-3 mg(2.5 mg base)/3 mL nebulizer solution USE 1 VIAL VIA NEBULIZER EVERY 6 TO 8 HOURS AS NEEDED FOR WHEEZING 360 mL 11    allopurinol (ZYLOPRIM) 100 MG tablet TAKE 2 TABLETS BY MOUTH ONCE DAILY 180 tablet 1    aspirin (ECOTRIN) 81 MG EC tablet Take 81 mg by  mouth once daily.      CARBOXYMETHYLCELLULOSE SODIUM (REFRESH OPHT) Apply to eye.      citalopram (CELEXA) 40 MG tablet TAKE 1 TABLET BY MOUTH DAILY 136 tablet 2    clonazePAM (KLONOPIN) 1 MG tablet TAKE 1 AND 1/2 TABLETS(1.5 MG) BY MOUTH EVERY DAY 45 tablet 0    HYDROcodone-acetaminophen (NORCO) 5-325 mg per tablet Take 1/2 to 1 tablet one time during the the day as needed and 1 1/2 tablet at night 75 tablet 0    latanoprost 0.005 % ophthalmic solution INSTILL 1 DROP IN EACH EYE EVERY EVENING 2.5 mL 6    levothyroxine (SYNTHROID) 50 MCG tablet TAKE 1 TABLET BY MOUTH ONCE DAILY 90 tablet 0    losartan (COZAAR) 50 MG tablet TAKE 1 TABLET(50 MG) BY MOUTH EVERY DAY 90 tablet 1    MULTIVITAMIN Take 1 tablet by mouth Daily.      netarsudil (RHOPRESSA) 0.02 % Drop Place 1 drop into both eyes every evening. 2.5 mL 11    SYMBICORT 80-4.5 mcg/actuation HFAA INHALE 2 PUFFS INTO THE LUNGS TWICE DAILY. RINSE MOUTH AFTER USE 10.2 g 11     No current facility-administered medications for this visit.          Family History    Problem  Relation  Age of Onset      Heart attack  Mother       Hypertension  Mother       Stroke  Mother       Arthritis  Father       Glaucoma  Daughter         h/o narrow angle        Strabismus  Neg Hx       Retinal detachment  Neg Hx       Macular degeneration  Neg Hx       Blindness  Neg Hx       Amblyopia  Neg Hx       History      Social History      Marital Status:        Spouse Name:  N/A      Number of Children:  N/A      Years of Education:  N/A      Occupational History      Not on file.      Social History Main Topics      Smoking status:  Former Smoker -- 3.0 packs/day for 40 years      Smokeless tobacco:  Never Used      Alcohol Use:  Yes       Comment: social use of wine      Drug Use:  No      Sexually Active:  Not Currently      Birth Control/ Protection:  None      Other Topics  Concern      Not on file      Social History Narrative      No narrative  "on file      Review of Systems:   1. GENERAL: patient denies any fever, weight changes, generalized weakness, dizziness.   2. HEENT: patient denies headaches, visual disturbances, swallowing problems, sinus pain, nasal congestion.   3. CARDIOVASCULAR: patient denies chest pain, palpitations.   4. PULMONARY: patient denies current SOB, she denies coughing, hemoptysis, wheezing.   5. GASTROINTESTINAL: patient denies abdominal pain, nausea, vomiting, diarrhea.   6. GENITOURINARY: patient denies dysuria, hematuria, hesitancy, frequency  7. EXTREMITIES: patient denies LE edema, LE cramping  8. DERMATOLOGY: patient denies rashes, ulcers.   9. NEURO: patient denies tremors, extremity weakness, extremity numbness/tingling.   10. MUSCULOSKELETAL: patient denies joint swelling  11. HEMATOLOGY: patient denies rectal or gum bleeding.   12: PSYCH: patient denies anxiety, depression.       PHYSICAL EXAM:   /70   Pulse 88   Ht 5' 3.5" (1.613 m)   Wt 37.2 kg (82 lb 0.2 oz)   BMI 14.30 kg/m²       GENERAL: Emaciated lady presents to clinic with non-labored breathing and wheelchair, getting O2 via nasal cannula  HEENT: PERRL, no nasal discharge, no icterus, no oral exudates, moist mucosal membranes.   NECK: no thyroid mass, no lymphadenopathy.   HEART: RRR S1/S2, no rubs, good peripheral pulses.   LUNGS: CTA bilaterally, no wheezing, breathing is nonlabored.   ABDOMEN: soft, nontender, not distended, bowel sounds are present, no abdominal hernia.   EXTREM: no LE edema  SKIN: no rashes, skin is warm and dry.   NEURO: Alert, responsive    LABORATORY RESULTS:   Lab Results   Component Value Date    CREATININE 1.2 10/02/2018    CREATININE 1.2 10/02/2018    BUN 36 (H) 10/02/2018    BUN 36 (H) 10/02/2018     10/02/2018     10/02/2018    K 4.3 10/02/2018    K 4.3 10/02/2018     10/02/2018     10/02/2018    CO2 25 10/02/2018    CO2 25 10/02/2018     Lab Results   Component Value Date    ALBUMIN 3.4 (L) " 10/02/2018    ALBUMIN 3.4 (L) 10/02/2018     Lab Results   Component Value Date    PTH 93 (H) 05/01/2014    CALCIUM 9.5 10/02/2018    CALCIUM 9.5 10/02/2018    PHOS 3.8 10/02/2018     Lab Results   Component Value Date    WBC 5.77 10/19/2017    HGB 10.2 (L) 10/19/2017    HCT 32.9 (L) 10/19/2017    MCV 99 (H) 10/19/2017     10/19/2017       Renal ultrasound from 1/18/12: right kidney 9 cm, left kidney 7.2 cm, bilateral simple cysts.     Protein Creatinine Ratios:    Creatinine, Random Ur   Date Value Ref Range Status   10/02/2018 20.0 15.0 - 325.0 mg/dL Final     Comment:     The random urine reference ranges provided were established   for 24 hour urine collections.  No reference ranges exist for  random urine specimens.  Correlate clinically.     04/17/2018 23.0 15.0 - 325.0 mg/dL Final     Comment:     The random urine reference ranges provided were established   for 24 hour urine collections.  No reference ranges exist for  random urine specimens.  Correlate clinically.     10/19/2017 38.0 15.0 - 325.0 mg/dL Final     Comment:     The random urine reference ranges provided were established   for 24 hour urine collections.  No reference ranges exist for  random urine specimens.  Correlate clinically.       Protein, Urine   Date Value Ref Range Status   03/26/2008 <5 (A) 0 - 10 mg/dl Final     Protein, Urine Random   Date Value Ref Range Status   10/02/2018 12 0 - 15 mg/dL Final     Comment:     The random urine reference ranges provided were established   for 24 hour urine collections.  No reference ranges exist for  random urine specimens.  Correlate clinically.     04/17/2018 9 0 - 15 mg/dL Final     Comment:     The random urine reference ranges provided were established   for 24 hour urine collections.  No reference ranges exist for  random urine specimens.  Correlate clinically.     10/19/2017 15 0 - 15 mg/dL Final     Comment:     The random urine reference ranges provided were established   for 24  hour urine collections.  No reference ranges exist for  random urine specimens.  Correlate clinically.       Prot/Creat Ratio, Ur   Date Value Ref Range Status   10/02/2018 0.60 (H) 0.00 - 0.20 Final   04/17/2018 0.39 (H) 0.00 - 0.20 Final   10/19/2017 0.39 (H) 0.00 - 0.20 Final         ASSESSMENT AND PLAN:   89 y.o. female with history of CAD, CHF, HTN, pulmonary HTN, COPD, arthritis, breast CA presents to the renal clinic for evaluation of renal insufficiency.     1. Renal insufficiency: Patient's renal function has stabilized with creatinine at 1.2. Patient was advised to hydrate with 50 ounces of water per day. Protein creatinine ratio was 0.6. Continue Losartan.   Patient's renal function will be monitored closely and she will return to the clinic in 6 months for follow up.     2. Electrolytes: borderline hyperkalemia has resolved with low K diet which will be continued.     3. Acid base status: no issues.    4. Volume: Euvolemic.     5. Hypertension: good BP control.     6. Medications: Reviewed.     7. Anemia: stable Hgb.

## 2018-10-30 ENCOUNTER — PATIENT MESSAGE (OUTPATIENT)
Dept: INTERNAL MEDICINE | Facility: CLINIC | Age: 83
End: 2018-10-30

## 2018-10-31 ENCOUNTER — OFFICE VISIT (OUTPATIENT)
Dept: INTERNAL MEDICINE | Facility: CLINIC | Age: 83
End: 2018-10-31
Payer: MEDICARE

## 2018-10-31 VITALS
HEIGHT: 64 IN | BODY MASS INDEX: 14.19 KG/M2 | DIASTOLIC BLOOD PRESSURE: 64 MMHG | SYSTOLIC BLOOD PRESSURE: 98 MMHG | TEMPERATURE: 98 F | HEART RATE: 72 BPM | WEIGHT: 83.13 LBS

## 2018-10-31 DIAGNOSIS — M25.511 CHRONIC RIGHT SHOULDER PAIN: ICD-10-CM

## 2018-10-31 DIAGNOSIS — T07.XXXA LACERATION OF MULTIPLE SITES OF SKIN: Primary | ICD-10-CM

## 2018-10-31 DIAGNOSIS — F41.9 ANXIETY: ICD-10-CM

## 2018-10-31 DIAGNOSIS — G89.29 CHRONIC RIGHT SHOULDER PAIN: ICD-10-CM

## 2018-10-31 PROCEDURE — 99213 OFFICE O/P EST LOW 20 MIN: CPT | Mod: PBBFAC,HCNC,PO | Performed by: FAMILY MEDICINE

## 2018-10-31 PROCEDURE — 99999 PR PBB SHADOW E&M-EST. PATIENT-LVL III: CPT | Mod: PBBFAC,HCNC,, | Performed by: FAMILY MEDICINE

## 2018-10-31 PROCEDURE — 90662 IIV NO PRSV INCREASED AG IM: CPT | Mod: PBBFAC,HCNC,PO

## 2018-10-31 PROCEDURE — 99214 OFFICE O/P EST MOD 30 MIN: CPT | Mod: S$PBB,HCNC,, | Performed by: FAMILY MEDICINE

## 2018-10-31 PROCEDURE — 1101F PT FALLS ASSESS-DOCD LE1/YR: CPT | Mod: CPTII,HCNC,, | Performed by: FAMILY MEDICINE

## 2018-10-31 RX ORDER — CLONAZEPAM 1 MG/1
TABLET ORAL
Qty: 45 TABLET | Refills: 0 | Status: SHIPPED | OUTPATIENT
Start: 2018-10-31 | End: 2018-12-01 | Stop reason: SDUPTHER

## 2018-10-31 NOTE — PROGRESS NOTES
Subjective:       Patient ID: Anju Rivera is a 89 y.o. female.    Chief Complaint: Flu Vaccine and Wound Check    Wound:      Pt is a 89 year old who is here for left anterior laceration. Pt has chronic anxiety and pain. Pt is stable on her medications. She does need a flu shot      Review of Systems   Constitutional: Negative for activity change and unexpected weight change.   HENT: Negative for hearing loss, rhinorrhea and trouble swallowing.    Eyes: Negative for discharge and visual disturbance.   Respiratory: Negative for chest tightness and wheezing.    Cardiovascular: Negative for chest pain and palpitations.   Gastrointestinal: Negative for blood in stool, constipation, diarrhea and vomiting.   Endocrine: Negative for polydipsia and polyuria.   Genitourinary: Negative for difficulty urinating, dysuria, hematuria and menstrual problem.   Musculoskeletal: Positive for neck pain. Negative for arthralgias and joint swelling.   Skin:        laceration   Neurological: Negative for weakness and headaches.   Psychiatric/Behavioral: Negative for confusion and dysphoric mood.       Objective:      Physical Exam   Constitutional: She is oriented to person, place, and time. She appears well-developed and well-nourished.   Cardiovascular: Normal rate and regular rhythm. Exam reveals no friction rub.   No murmur heard.  Pulmonary/Chest: Effort normal and breath sounds normal. No stridor. She has no wheezes.   Abdominal: Soft. Bowel sounds are normal. There is no tenderness. There is no guarding.   Neurological: She is alert and oriented to person, place, and time.   Skin: Skin is warm and dry.        Psychiatric: She has a normal mood and affect. Her behavior is normal.       Assessment:       1. Laceration of multiple sites of skin    2. Anxiety    3. Chronic right shoulder pain        Plan:       Laceration of multiple sites of skin  Comments:  Will have her start on the bactroban    Anxiety  Comments:  Will  continue with the klonopin    Chronic right shoulder pain  Comments:  Will continue on the norco

## 2018-11-07 ENCOUNTER — PATIENT MESSAGE (OUTPATIENT)
Dept: INTERNAL MEDICINE | Facility: CLINIC | Age: 83
End: 2018-11-07

## 2018-11-07 DIAGNOSIS — R52 PAIN: ICD-10-CM

## 2018-11-08 RX ORDER — HYDROCODONE BITARTRATE AND ACETAMINOPHEN 5; 325 MG/1; MG/1
TABLET ORAL
Qty: 75 TABLET | Refills: 0 | Status: SHIPPED | OUTPATIENT
Start: 2018-11-08 | End: 2018-12-10 | Stop reason: SDUPTHER

## 2018-11-09 ENCOUNTER — PATIENT MESSAGE (OUTPATIENT)
Dept: OPHTHALMOLOGY | Facility: CLINIC | Age: 83
End: 2018-11-09

## 2018-11-09 DIAGNOSIS — J44.9 CHRONIC OBSTRUCTIVE PULMONARY DISEASE, UNSPECIFIED COPD TYPE: ICD-10-CM

## 2018-11-09 RX ORDER — IPRATROPIUM BROMIDE AND ALBUTEROL SULFATE 2.5; .5 MG/3ML; MG/3ML
SOLUTION RESPIRATORY (INHALATION)
Qty: 360 ML | Refills: 6 | Status: SHIPPED | OUTPATIENT
Start: 2018-11-09 | End: 2019-12-09 | Stop reason: SDUPTHER

## 2018-11-29 DIAGNOSIS — J44.9 CHRONIC OBSTRUCTIVE PULMONARY DISEASE, UNSPECIFIED COPD TYPE: ICD-10-CM

## 2018-11-29 RX ORDER — BUDESONIDE AND FORMOTEROL FUMARATE DIHYDRATE 80; 4.5 UG/1; UG/1
AEROSOL RESPIRATORY (INHALATION)
Qty: 10.2 G | Refills: 6 | Status: SHIPPED | OUTPATIENT
Start: 2018-11-29 | End: 2019-06-27 | Stop reason: SDUPTHER

## 2018-12-03 RX ORDER — CLONAZEPAM 1 MG/1
TABLET ORAL
Qty: 45 TABLET | Refills: 0 | Status: SHIPPED | OUTPATIENT
Start: 2018-12-03 | End: 2018-12-31 | Stop reason: SDUPTHER

## 2018-12-03 RX ORDER — LEVOTHYROXINE SODIUM 50 UG/1
TABLET ORAL
Qty: 90 TABLET | Refills: 0 | Status: SHIPPED | OUTPATIENT
Start: 2018-12-03 | End: 2020-11-24 | Stop reason: SDUPTHER

## 2018-12-10 ENCOUNTER — PATIENT MESSAGE (OUTPATIENT)
Dept: INTERNAL MEDICINE | Facility: CLINIC | Age: 83
End: 2018-12-10

## 2018-12-10 DIAGNOSIS — H40.1133 PRIMARY OPEN ANGLE GLAUCOMA OF BOTH EYES, SEVERE STAGE: ICD-10-CM

## 2018-12-10 DIAGNOSIS — R52 PAIN: ICD-10-CM

## 2018-12-10 RX ORDER — HYDROCODONE BITARTRATE AND ACETAMINOPHEN 5; 325 MG/1; MG/1
TABLET ORAL
Qty: 75 TABLET | Refills: 0 | Status: SHIPPED | OUTPATIENT
Start: 2018-12-10 | End: 2019-01-11 | Stop reason: SDUPTHER

## 2018-12-10 RX ORDER — LATANOPROST 50 UG/ML
SOLUTION/ DROPS OPHTHALMIC
Qty: 2.5 ML | Refills: 6 | Status: SHIPPED | OUTPATIENT
Start: 2018-12-10 | End: 2019-07-01 | Stop reason: SDUPTHER

## 2018-12-17 ENCOUNTER — OFFICE VISIT (OUTPATIENT)
Dept: OPHTHALMOLOGY | Facility: CLINIC | Age: 83
End: 2018-12-17
Payer: MEDICARE

## 2018-12-17 DIAGNOSIS — H04.129 DRY EYE: ICD-10-CM

## 2018-12-17 DIAGNOSIS — H40.1133 PRIMARY OPEN ANGLE GLAUCOMA OF BOTH EYES, SEVERE STAGE: Primary | ICD-10-CM

## 2018-12-17 DIAGNOSIS — Z96.1 PSEUDOPHAKIA OF BOTH EYES: ICD-10-CM

## 2018-12-17 PROCEDURE — 92083 EXTENDED VISUAL FIELD XM: CPT | Mod: HCNC,S$GLB,, | Performed by: OPHTHALMOLOGY

## 2018-12-17 PROCEDURE — 92014 COMPRE OPH EXAM EST PT 1/>: CPT | Mod: HCNC,S$GLB,, | Performed by: OPHTHALMOLOGY

## 2018-12-17 PROCEDURE — 99999 PR PBB SHADOW E&M-EST. PATIENT-LVL II: CPT | Mod: PBBFAC,HCNC,, | Performed by: OPHTHALMOLOGY

## 2018-12-17 PROCEDURE — 92250 FUNDUS PHOTOGRAPHY W/I&R: CPT | Mod: HCNC,S$GLB,, | Performed by: OPHTHALMOLOGY

## 2018-12-17 NOTE — PROGRESS NOTES
SUBJECTIVE:   Anju Rivera is a 89 y.o. female   Corrected distance visual acuity was 20/40 +1 in the right eye and 20/25 -1 in the left eye. Corrected near visual acuity was J3 in the right eye and J3 in the left eye.   Chief Complaint   Patient presents with    Glaucoma     3 Months 24-2 HVF, Dilation, SDP's        HPI:  HPI     Glaucoma      Additional comments: 3 Months 24-2 HVF, Dilation, SDP's              Comments     Patient States she is having trouble with reading & dry eyes    1. PCIOL OU  2. Severe Coag with enlarged cups (init 38/18) Goal < 16  Vf progression OD 11/17 add Brimonidine  Brimonidine-D/C due to drowsiness,redness,irritation   3. PVD OD  4. H/O narrow angles  5. Blepharitis  6 YUNG OU    Latanoprost qhs OU  Rhopressa QHS OU (Patient Not Using This Drop)  Systane PRN          Last edited by Jazmín Yadav on 12/17/2018  2:55 PM. (History)        Assessment /Plan :  1. Primary open angle glaucoma of both eyes, severe stage Doing well, IOP within acceptable range relative to target IOP and no evidence of progression. Continue current treatment. Reviewed importance of continued compliance with treatment and follow up.      Will resume Rhopressa qhs ou   2. Pseudophakia of both eyes  -- Condition stable, no therapeutic change required. Monitoring routinely.     3. Dry eye cont at's prn ou          Return to clinic in 3-4 months  or as needed.  With IOP Check and GOCT

## 2018-12-31 RX ORDER — CLONAZEPAM 1 MG/1
TABLET ORAL
Qty: 45 TABLET | Refills: 0 | Status: SHIPPED | OUTPATIENT
Start: 2018-12-31 | End: 2019-02-01 | Stop reason: SDUPTHER

## 2019-01-02 ENCOUNTER — PATIENT MESSAGE (OUTPATIENT)
Dept: OPHTHALMOLOGY | Facility: CLINIC | Age: 84
End: 2019-01-02

## 2019-01-07 ENCOUNTER — TELEPHONE (OUTPATIENT)
Dept: OPHTHALMOLOGY | Facility: CLINIC | Age: 84
End: 2019-01-07

## 2019-01-11 DIAGNOSIS — R52 PAIN: ICD-10-CM

## 2019-01-14 RX ORDER — HYDROCODONE BITARTRATE AND ACETAMINOPHEN 5; 325 MG/1; MG/1
TABLET ORAL
Qty: 75 TABLET | Refills: 0 | Status: SHIPPED | OUTPATIENT
Start: 2019-01-14 | End: 2019-02-19 | Stop reason: SDUPTHER

## 2019-01-25 ENCOUNTER — PATIENT MESSAGE (OUTPATIENT)
Dept: PULMONOLOGY | Facility: CLINIC | Age: 84
End: 2019-01-25

## 2019-01-28 ENCOUNTER — HOSPITAL ENCOUNTER (OUTPATIENT)
Dept: RADIOLOGY | Facility: HOSPITAL | Age: 84
Discharge: HOME | End: 2019-01-28
Attending: NURSE PRACTITIONER
Payer: MEDICARE

## 2019-01-28 ENCOUNTER — OFFICE VISIT (OUTPATIENT)
Dept: PULMONOLOGY | Facility: CLINIC | Age: 84
End: 2019-01-28
Payer: MEDICARE

## 2019-01-28 VITALS
WEIGHT: 83 LBS | HEIGHT: 64 IN | HEART RATE: 73 BPM | BODY MASS INDEX: 14.17 KG/M2 | DIASTOLIC BLOOD PRESSURE: 68 MMHG | OXYGEN SATURATION: 94 % | SYSTOLIC BLOOD PRESSURE: 110 MMHG | RESPIRATION RATE: 16 BRPM

## 2019-01-28 DIAGNOSIS — J44.9 CHRONIC OBSTRUCTIVE PULMONARY DISEASE, UNSPECIFIED COPD TYPE: Primary | ICD-10-CM

## 2019-01-28 DIAGNOSIS — J44.9 CHRONIC OBSTRUCTIVE PULMONARY DISEASE, UNSPECIFIED COPD TYPE: ICD-10-CM

## 2019-01-28 DIAGNOSIS — R06.02 SOB (SHORTNESS OF BREATH): ICD-10-CM

## 2019-01-28 PROCEDURE — 99499 UNLISTED E&M SERVICE: CPT | Mod: S$GLB,,, | Performed by: NURSE PRACTITIONER

## 2019-01-28 PROCEDURE — 3288F PR FALLS RISK ASSESSMENT DOCUMENTED: ICD-10-PCS | Mod: HCNC,CPTII,S$GLB, | Performed by: NURSE PRACTITIONER

## 2019-01-28 PROCEDURE — 3288F FALL RISK ASSESSMENT DOCD: CPT | Mod: HCNC,CPTII,S$GLB, | Performed by: NURSE PRACTITIONER

## 2019-01-28 PROCEDURE — 99999 PR PBB SHADOW E&M-EST. PATIENT-LVL IV: CPT | Mod: PBBFAC,HCNC,, | Performed by: NURSE PRACTITIONER

## 2019-01-28 PROCEDURE — 99999 PR PBB SHADOW E&M-EST. PATIENT-LVL IV: ICD-10-PCS | Mod: PBBFAC,HCNC,, | Performed by: NURSE PRACTITIONER

## 2019-01-28 PROCEDURE — 99214 PR OFFICE/OUTPT VISIT, EST, LEVL IV, 30-39 MIN: ICD-10-PCS | Mod: HCNC,S$GLB,, | Performed by: NURSE PRACTITIONER

## 2019-01-28 PROCEDURE — 71046 X-RAY EXAM CHEST 2 VIEWS: CPT | Mod: TC,HCNC

## 2019-01-28 PROCEDURE — 71046 X-RAY EXAM CHEST 2 VIEWS: CPT | Mod: 26,HCNC,, | Performed by: RADIOLOGY

## 2019-01-28 PROCEDURE — 71046 XR CHEST PA AND LATERAL: ICD-10-PCS | Mod: 26,HCNC,, | Performed by: RADIOLOGY

## 2019-01-28 PROCEDURE — 1100F PTFALLS ASSESS-DOCD GE2>/YR: CPT | Mod: HCNC,CPTII,S$GLB, | Performed by: NURSE PRACTITIONER

## 2019-01-28 PROCEDURE — 99499 RISK ADDL DX/OHS AUDIT: ICD-10-PCS | Mod: S$GLB,,, | Performed by: NURSE PRACTITIONER

## 2019-01-28 PROCEDURE — 99214 OFFICE O/P EST MOD 30 MIN: CPT | Mod: HCNC,S$GLB,, | Performed by: NURSE PRACTITIONER

## 2019-01-28 PROCEDURE — 1100F PR PT FALLS ASSESS DOC 2+ FALLS/FALL W/INJURY/YR: ICD-10-PCS | Mod: HCNC,CPTII,S$GLB, | Performed by: NURSE PRACTITIONER

## 2019-01-28 NOTE — PROGRESS NOTES
"Subjective:      Patient ID: Anju Rivera is a 89 y.o. female.    Chief Complaint: COPD    HPI  Patient was COPD presents to the office today for evaluation.  She has an increased shortness of breath over the last week.   She is compliant with Symbicort uses spacer.  She is taking DuoNeb treatments.  Mucinex twice a day. She needs a new nebulizer. It is over 5 years old and not working well. She benefits from use of nebulizer for her COPD.     Patient Active Problem List   Diagnosis    Coronary artery disease    Chronic diastolic heart failure    Mitral regurgitation    COPD (chronic obstructive pulmonary disease)    Hypertension    Aortic valve disorder    Pulmonary hypertension    Mixed hyperlipidemia    COAG (chronic open-angle glaucoma) - Both Eyes    HX of MI    Hypothyroidism    Peripheral vascular disease    Amputated great toe    Anxiety    Shortness of breath    PVC's (premature ventricular contractions)    Proteinuria    Chronic kidney disease, stage III (moderate)    History of breast cancer    History of skin cancer    Major depression    Carotid artery disease    Tortuous aorta    Primary open angle glaucoma of both eyes, severe stage    Pseudophakia of both eyes    Refractive error    Primary osteoarthritis of right shoulder    Closed left arm fracture    Paronychia of finger of right hand    Osteoporosis    Bilateral foot pain    Chronic right shoulder pain    Pneumonia of left lower lobe due to infectious organism    Laceration of multiple sites of skin         /68   Pulse 73   Resp 16   Ht 5' 3.5" (1.613 m)   Wt 37.6 kg (83 lb)   SpO2 (!) 94% Comment: 2 LPM  BMI 14.47 kg/m²   Body mass index is 14.47 kg/m².    Review of Systems   Constitutional: Positive for weakness.   Respiratory: Positive for shortness of breath.    Cardiovascular: Negative for leg swelling.   Musculoskeletal: Positive for arthralgias and gait problem.   All other systems " reviewed and are negative.    Objective:      Physical Exam   Constitutional: She is oriented to person, place, and time. She appears well-developed and well-nourished.   Thin frail elderly female in wheelchair.   HENT:   Head: Normocephalic and atraumatic.   Neck: Normal range of motion. Neck supple.   Cardiovascular: Normal rate and regular rhythm.   Pulmonary/Chest: Effort normal.   Decreased BS   Abdominal: Soft. Bowel sounds are normal.   Musculoskeletal: She exhibits no edema.   Neurological: She is alert and oriented to person, place, and time.   Skin: Skin is warm and dry.   Psychiatric: She has a normal mood and affect.         Assessment:       1. Chronic obstructive pulmonary disease, unspecified COPD type    2. SOB (shortness of breath)        Outpatient Encounter Medications as of 1/28/2019   Medication Sig Dispense Refill    albuterol (VENTOLIN HFA) 90 mcg/actuation inhaler Inhale 2 puffs into the lungs every 4 (four) hours as needed for Wheezing. 18 g 3    albuterol-ipratropium (DUO-NEB) 2.5 mg-0.5 mg/3 mL nebulizer solution USE 1 VIAL VIA NEBULIZER EVERY 6 TO 8 HOURS AS NEEDED FOR WHEEZING 360 mL 6    allopurinol (ZYLOPRIM) 100 MG tablet TAKE 2 TABLETS BY MOUTH ONCE DAILY 180 tablet 1    aspirin (ECOTRIN) 81 MG EC tablet Take 81 mg by mouth once daily.      CARBOXYMETHYLCELLULOSE SODIUM (REFRESH OPHT) Apply to eye.      citalopram (CELEXA) 40 MG tablet TAKE 1 TABLET BY MOUTH DAILY 136 tablet 2    clonazePAM (KLONOPIN) 1 MG tablet TAKE 1 AND 1/2 TABLETS(1.5 MG) BY MOUTH EVERY DAY 45 tablet 0    HYDROcodone-acetaminophen (NORCO) 5-325 mg per tablet Take 1/2 to 1 tablet one time during the the day as needed and 1 1/2 tablet at night 75 tablet 0    latanoprost 0.005 % ophthalmic solution INSTILL 1 DROP IN EACH EYE EVERY EVENING 2.5 mL 6    levothyroxine (SYNTHROID) 50 MCG tablet TAKE 1 TABLET BY MOUTH ONCE DAILY 90 tablet 0    levothyroxine (SYNTHROID) 50 MCG tablet TAKE 1 TABLET BY MOUTH ONCE  DAILY 90 tablet 0    losartan (COZAAR) 50 MG tablet TAKE 1 TABLET(50 MG) BY MOUTH EVERY DAY 90 tablet 1    MULTIVITAMIN Take 1 tablet by mouth Daily.      SYMBICORT 80-4.5 mcg/actuation HFAA INHALE 2 PUFFS INTO THE LUNGS TWICE DAILY. RINSE MOUTH AFTER USE 10.2 g 6     No facility-administered encounter medications on file as of 1/28/2019.      Orders Placed This Encounter   Procedures    NEBULIZER FOR HOME USE     J. 44.9 [employer]. Over 5 yrs old and out of date.     Order Specific Question:   Height:     Answer:   .     Order Specific Question:   Weight:     Answer:   .     Order Specific Question:   Length of need (1-99 months):     Answer:   99    NEBULIZER KIT (SUPPLIES) FOR HOME USE     comed     Order Specific Question:   Height:     Answer:   .     Order Specific Question:   Weight:     Answer:   .     Order Specific Question:   Length of need (1-99 months):     Answer:   99     Order Specific Question:   Mask or Mouthpiece?     Answer:   Mouthpiece    X-Ray Chest PA And Lateral     Standing Status:   Future     Standing Expiration Date:   1/28/2020     Order Specific Question:   May the Radiologist modify the order per protocol to meet the clinical needs of the patient?     Answer:   Yes    X-Ray Chest PA And Lateral     Standing Status:   Future     Standing Expiration Date:   1/28/2020     Order Specific Question:   May the Radiologist modify the order per protocol to meet the clinical needs of the patient?     Answer:   Yes     Plan:   Order for new nebulizer.   CXR today.  Use spacer for albuterol  Continue Symbicort.  Review CXR and make recommendations. If stable, see cardiology

## 2019-02-01 RX ORDER — CLONAZEPAM 1 MG/1
1 TABLET ORAL DAILY
Qty: 45 TABLET | Refills: 0 | Status: SHIPPED | OUTPATIENT
Start: 2019-02-01 | End: 2019-02-04

## 2019-02-03 ENCOUNTER — PATIENT MESSAGE (OUTPATIENT)
Dept: INTERNAL MEDICINE | Facility: CLINIC | Age: 84
End: 2019-02-03

## 2019-02-04 RX ORDER — CLONAZEPAM 1 MG/1
TABLET ORAL
Qty: 45 TABLET | Refills: 1 | Status: SHIPPED | OUTPATIENT
Start: 2019-02-04 | End: 2019-04-08 | Stop reason: SDUPTHER

## 2019-02-07 ENCOUNTER — PATIENT MESSAGE (OUTPATIENT)
Dept: PULMONOLOGY | Facility: CLINIC | Age: 84
End: 2019-02-07

## 2019-02-19 DIAGNOSIS — R52 PAIN: ICD-10-CM

## 2019-02-20 RX ORDER — HYDROCODONE BITARTRATE AND ACETAMINOPHEN 5; 325 MG/1; MG/1
TABLET ORAL
Qty: 75 TABLET | Refills: 0 | Status: SHIPPED | OUTPATIENT
Start: 2019-02-20 | End: 2019-03-21 | Stop reason: SDUPTHER

## 2019-02-25 RX ORDER — LEVOTHYROXINE SODIUM 50 UG/1
TABLET ORAL
Qty: 90 TABLET | Refills: 2 | Status: SHIPPED | OUTPATIENT
Start: 2019-02-25 | End: 2020-11-24 | Stop reason: SDUPTHER

## 2019-03-07 RX ORDER — CLONAZEPAM 1 MG/1
TABLET ORAL
Qty: 45 TABLET | Refills: 1 | Status: CANCELLED | OUTPATIENT
Start: 2019-03-07

## 2019-03-18 ENCOUNTER — PATIENT MESSAGE (OUTPATIENT)
Dept: INTERNAL MEDICINE | Facility: CLINIC | Age: 84
End: 2019-03-18

## 2019-03-18 RX ORDER — ALLOPURINOL 100 MG/1
TABLET ORAL
Qty: 180 TABLET | Refills: 0 | Status: SHIPPED | OUTPATIENT
Start: 2019-03-18 | End: 2019-06-16 | Stop reason: SDUPTHER

## 2019-03-21 ENCOUNTER — PATIENT MESSAGE (OUTPATIENT)
Dept: INTERNAL MEDICINE | Facility: CLINIC | Age: 84
End: 2019-03-21

## 2019-03-21 ENCOUNTER — PATIENT MESSAGE (OUTPATIENT)
Dept: PULMONOLOGY | Facility: CLINIC | Age: 84
End: 2019-03-21

## 2019-03-21 DIAGNOSIS — R52 PAIN: ICD-10-CM

## 2019-03-22 RX ORDER — HYDROCODONE BITARTRATE AND ACETAMINOPHEN 5; 325 MG/1; MG/1
TABLET ORAL
Qty: 75 TABLET | Refills: 0 | Status: SHIPPED | OUTPATIENT
Start: 2019-03-22 | End: 2019-04-29 | Stop reason: SDUPTHER

## 2019-03-25 ENCOUNTER — OFFICE VISIT (OUTPATIENT)
Dept: OPHTHALMOLOGY | Facility: CLINIC | Age: 84
End: 2019-03-25
Payer: MEDICARE

## 2019-03-25 DIAGNOSIS — H04.129 DRY EYE: ICD-10-CM

## 2019-03-25 DIAGNOSIS — H40.1133 PRIMARY OPEN ANGLE GLAUCOMA OF BOTH EYES, SEVERE STAGE: Primary | ICD-10-CM

## 2019-03-25 DIAGNOSIS — Z96.1 PSEUDOPHAKIA OF BOTH EYES: ICD-10-CM

## 2019-03-25 PROCEDURE — 92012 PR EYE EXAM, EST PATIENT,INTERMED: ICD-10-PCS | Mod: HCNC,S$GLB,, | Performed by: OPHTHALMOLOGY

## 2019-03-25 PROCEDURE — 92012 INTRM OPH EXAM EST PATIENT: CPT | Mod: HCNC,S$GLB,, | Performed by: OPHTHALMOLOGY

## 2019-03-25 PROCEDURE — 99499 UNLISTED E&M SERVICE: CPT | Mod: HCNC,S$GLB,, | Performed by: OPHTHALMOLOGY

## 2019-03-25 PROCEDURE — 99499 RISK ADDL DX/OHS AUDIT: ICD-10-PCS | Mod: HCNC,S$GLB,, | Performed by: OPHTHALMOLOGY

## 2019-03-25 PROCEDURE — 99999 PR PBB SHADOW E&M-EST. PATIENT-LVL II: ICD-10-PCS | Mod: PBBFAC,HCNC,, | Performed by: OPHTHALMOLOGY

## 2019-03-25 PROCEDURE — 92133 POSTERIOR SEGMENT OCT OPTIC NERVE(OCULAR COHERENCE TOMOGRAPHY) - OU - BOTH EYES: ICD-10-PCS | Mod: HCNC,S$GLB,, | Performed by: OPHTHALMOLOGY

## 2019-03-25 PROCEDURE — 99999 PR PBB SHADOW E&M-EST. PATIENT-LVL II: CPT | Mod: PBBFAC,HCNC,, | Performed by: OPHTHALMOLOGY

## 2019-03-25 PROCEDURE — 92133 CPTRZD OPH DX IMG PST SGM ON: CPT | Mod: HCNC,S$GLB,, | Performed by: OPHTHALMOLOGY

## 2019-03-25 NOTE — PROGRESS NOTES
SUBJECTIVE:   Anju Rivera is a 89 y.o. female   Corrected distance visual acuity was 20/40 in the right eye and 20/25  -1 in the left eye.   Chief Complaint   Patient presents with    Glaucoma     3M IOP check and GOCT        HPI:  HPI     Glaucoma      Additional comments: 3M IOP check and GOCT              Comments     The patient states her eyes are doing okay but she is seeing shadowing on   things. The patient denies any ocular pain at this time.    1. PCIOL OU  2. Severe Coag with enlarged cups (init 38/18) Goal < 16  Vf progression OD 11/17 add Brimonidine  Brimonidine-D/C due to drowsiness,redness,irritation   3. PVD OD  4. H/O narrow angles  5. Blepharitis  6 YUNG OU      Latanoprost qhs OU  Rhopressa QHS OU (not using)  Systane PRN          Last edited by Leticia Crump on 3/25/2019  2:06 PM. (History)        Assessment /Plan :  1. Primary open angle glaucoma of both eyes, severe stage Doing well, IOP within acceptable range relative to target IOP and no evidence of progression. Continue current treatment. Reviewed importance of continued compliance with treatment and follow up.     2. Pseudophakia of both eyes  -- Condition stable, no therapeutic change required. Monitoring routinely.     3. Dry eye cont Recommend systane qid ou             Return to clinic in 3-4 months  or as needed.  With IOP Check

## 2019-03-26 ENCOUNTER — PATIENT MESSAGE (OUTPATIENT)
Dept: INTERNAL MEDICINE | Facility: CLINIC | Age: 84
End: 2019-03-26

## 2019-03-28 ENCOUNTER — PATIENT MESSAGE (OUTPATIENT)
Dept: NEPHROLOGY | Facility: CLINIC | Age: 84
End: 2019-03-28

## 2019-04-08 RX ORDER — CLONAZEPAM 1 MG/1
TABLET ORAL
Qty: 45 TABLET | Refills: 1 | Status: SHIPPED | OUTPATIENT
Start: 2019-04-08 | End: 2019-04-29

## 2019-04-22 ENCOUNTER — LAB VISIT (OUTPATIENT)
Dept: LAB | Facility: HOSPITAL | Age: 84
End: 2019-04-22
Attending: INTERNAL MEDICINE
Payer: MEDICARE

## 2019-04-22 DIAGNOSIS — N18.30 CKD (CHRONIC KIDNEY DISEASE) STAGE 3, GFR 30-59 ML/MIN: ICD-10-CM

## 2019-04-22 LAB
ALBUMIN SERPL BCP-MCNC: 3.1 G/DL (ref 3.5–5.2)
ANION GAP SERPL CALC-SCNC: 9 MMOL/L (ref 8–16)
BUN SERPL-MCNC: 48 MG/DL (ref 8–23)
CALCIUM SERPL-MCNC: 9.3 MG/DL (ref 8.7–10.5)
CHLORIDE SERPL-SCNC: 105 MMOL/L (ref 95–110)
CO2 SERPL-SCNC: 24 MMOL/L (ref 23–29)
CREAT SERPL-MCNC: 1.3 MG/DL (ref 0.5–1.4)
EST. GFR  (AFRICAN AMERICAN): 42 ML/MIN/1.73 M^2
EST. GFR  (NON AFRICAN AMERICAN): 36.5 ML/MIN/1.73 M^2
GLUCOSE SERPL-MCNC: 69 MG/DL (ref 70–110)
PHOSPHATE SERPL-MCNC: 3.4 MG/DL (ref 2.7–4.5)
POTASSIUM SERPL-SCNC: 4.3 MMOL/L (ref 3.5–5.1)
SODIUM SERPL-SCNC: 138 MMOL/L (ref 136–145)

## 2019-04-22 PROCEDURE — 80069 RENAL FUNCTION PANEL: CPT | Mod: HCNC

## 2019-04-22 PROCEDURE — 36415 COLL VENOUS BLD VENIPUNCTURE: CPT | Mod: HCNC

## 2019-04-29 ENCOUNTER — LAB VISIT (OUTPATIENT)
Dept: LAB | Facility: HOSPITAL | Age: 84
End: 2019-04-29
Attending: FAMILY MEDICINE
Payer: MEDICARE

## 2019-04-29 ENCOUNTER — OFFICE VISIT (OUTPATIENT)
Dept: INTERNAL MEDICINE | Facility: CLINIC | Age: 84
End: 2019-04-29
Payer: MEDICARE

## 2019-04-29 ENCOUNTER — OFFICE VISIT (OUTPATIENT)
Dept: NEPHROLOGY | Facility: CLINIC | Age: 84
End: 2019-04-29
Payer: MEDICARE

## 2019-04-29 VITALS
DIASTOLIC BLOOD PRESSURE: 50 MMHG | OXYGEN SATURATION: 91 % | HEART RATE: 77 BPM | WEIGHT: 80.94 LBS | BODY MASS INDEX: 14.11 KG/M2 | SYSTOLIC BLOOD PRESSURE: 150 MMHG | TEMPERATURE: 99 F

## 2019-04-29 VITALS
WEIGHT: 80.94 LBS | BODY MASS INDEX: 13.82 KG/M2 | HEART RATE: 74 BPM | HEIGHT: 64 IN | SYSTOLIC BLOOD PRESSURE: 160 MMHG | DIASTOLIC BLOOD PRESSURE: 80 MMHG

## 2019-04-29 VITALS
OXYGEN SATURATION: 95 % | BODY MASS INDEX: 13.82 KG/M2 | HEIGHT: 64 IN | TEMPERATURE: 99 F | HEART RATE: 78 BPM | WEIGHT: 80.94 LBS | SYSTOLIC BLOOD PRESSURE: 162 MMHG | DIASTOLIC BLOOD PRESSURE: 70 MMHG

## 2019-04-29 DIAGNOSIS — I50.32 CHRONIC DIASTOLIC HEART FAILURE: Chronic | ICD-10-CM

## 2019-04-29 DIAGNOSIS — H52.7 REFRACTIVE ERROR: ICD-10-CM

## 2019-04-29 DIAGNOSIS — E03.9 ACQUIRED HYPOTHYROIDISM: ICD-10-CM

## 2019-04-29 DIAGNOSIS — J43.9 PULMONARY EMPHYSEMA, UNSPECIFIED EMPHYSEMA TYPE: Chronic | ICD-10-CM

## 2019-04-29 DIAGNOSIS — R80.9 PROTEINURIA, UNSPECIFIED TYPE: ICD-10-CM

## 2019-04-29 DIAGNOSIS — G89.29 CHRONIC RIGHT SHOULDER PAIN: ICD-10-CM

## 2019-04-29 DIAGNOSIS — Z85.828 HISTORY OF SKIN CANCER: ICD-10-CM

## 2019-04-29 DIAGNOSIS — I34.0 NON-RHEUMATIC MITRAL REGURGITATION: Chronic | ICD-10-CM

## 2019-04-29 DIAGNOSIS — I73.9 PERIPHERAL VASCULAR DISEASE: ICD-10-CM

## 2019-04-29 DIAGNOSIS — E03.9 HYPOTHYROIDISM, UNSPECIFIED TYPE: ICD-10-CM

## 2019-04-29 DIAGNOSIS — I10 ESSENTIAL HYPERTENSION: Chronic | ICD-10-CM

## 2019-04-29 DIAGNOSIS — L03.011 PARONYCHIA OF FINGER OF RIGHT HAND: ICD-10-CM

## 2019-04-29 DIAGNOSIS — H40.1133 PRIMARY OPEN ANGLE GLAUCOMA OF BOTH EYES, SEVERE STAGE: ICD-10-CM

## 2019-04-29 DIAGNOSIS — I77.9 BILATERAL CAROTID ARTERY DISEASE, UNSPECIFIED TYPE: ICD-10-CM

## 2019-04-29 DIAGNOSIS — M79.672 BILATERAL FOOT PAIN: ICD-10-CM

## 2019-04-29 DIAGNOSIS — I35.9 AORTIC VALVE DISORDER: Chronic | ICD-10-CM

## 2019-04-29 DIAGNOSIS — I27.20 PULMONARY HYPERTENSION: Chronic | ICD-10-CM

## 2019-04-29 DIAGNOSIS — F41.9 ANXIETY: ICD-10-CM

## 2019-04-29 DIAGNOSIS — S98.112A AMPUTATED GREAT TOE OF LEFT FOOT: ICD-10-CM

## 2019-04-29 DIAGNOSIS — Z00.00 ENCOUNTER FOR PREVENTIVE HEALTH EXAMINATION: Primary | ICD-10-CM

## 2019-04-29 DIAGNOSIS — H40.1130 CHRONIC OPEN ANGLE GLAUCOMA OF BOTH EYES, UNSPECIFIED GLAUCOMA STAGE: ICD-10-CM

## 2019-04-29 DIAGNOSIS — R52 PAIN: ICD-10-CM

## 2019-04-29 DIAGNOSIS — E78.2 MIXED HYPERLIPIDEMIA: Chronic | ICD-10-CM

## 2019-04-29 DIAGNOSIS — F32.5 MAJOR DEPRESSIVE DISORDER WITH SINGLE EPISODE, IN REMISSION: ICD-10-CM

## 2019-04-29 DIAGNOSIS — M19.011 PRIMARY OSTEOARTHRITIS OF RIGHT SHOULDER: ICD-10-CM

## 2019-04-29 DIAGNOSIS — M79.671 BILATERAL FOOT PAIN: ICD-10-CM

## 2019-04-29 DIAGNOSIS — S42.302A CLOSED FRACTURE OF LEFT UPPER EXTREMITY, INITIAL ENCOUNTER: ICD-10-CM

## 2019-04-29 DIAGNOSIS — I25.83 CORONARY ARTERY DISEASE DUE TO LIPID RICH PLAQUE: Chronic | ICD-10-CM

## 2019-04-29 DIAGNOSIS — J44.9 CHRONIC OBSTRUCTIVE PULMONARY DISEASE, UNSPECIFIED COPD TYPE: Primary | Chronic | ICD-10-CM

## 2019-04-29 DIAGNOSIS — M81.0 AGE-RELATED OSTEOPOROSIS WITHOUT CURRENT PATHOLOGICAL FRACTURE: ICD-10-CM

## 2019-04-29 DIAGNOSIS — N18.30 CHRONIC KIDNEY DISEASE, STAGE III (MODERATE): ICD-10-CM

## 2019-04-29 DIAGNOSIS — M25.511 CHRONIC RIGHT SHOULDER PAIN: ICD-10-CM

## 2019-04-29 DIAGNOSIS — I25.2 OLD MI (MYOCARDIAL INFARCTION): Chronic | ICD-10-CM

## 2019-04-29 DIAGNOSIS — I77.1 TORTUOUS AORTA: ICD-10-CM

## 2019-04-29 DIAGNOSIS — I49.3 PVC'S (PREMATURE VENTRICULAR CONTRACTIONS): ICD-10-CM

## 2019-04-29 DIAGNOSIS — Z85.3 HISTORY OF BREAST CANCER: ICD-10-CM

## 2019-04-29 DIAGNOSIS — R06.02 SHORTNESS OF BREATH: ICD-10-CM

## 2019-04-29 DIAGNOSIS — I25.10 CORONARY ARTERY DISEASE DUE TO LIPID RICH PLAQUE: Chronic | ICD-10-CM

## 2019-04-29 DIAGNOSIS — Z96.1 PSEUDOPHAKIA OF BOTH EYES: ICD-10-CM

## 2019-04-29 DIAGNOSIS — N18.30 CKD (CHRONIC KIDNEY DISEASE) STAGE 3, GFR 30-59 ML/MIN: Primary | ICD-10-CM

## 2019-04-29 PROBLEM — J18.9 PNEUMONIA OF LEFT LOWER LOBE DUE TO INFECTIOUS ORGANISM: Status: RESOLVED | Noted: 2018-07-18 | Resolved: 2019-04-29

## 2019-04-29 PROBLEM — T07.XXXA LACERATION OF MULTIPLE SITES OF SKIN: Status: RESOLVED | Noted: 2018-10-31 | Resolved: 2019-04-29

## 2019-04-29 LAB
ALBUMIN SERPL BCP-MCNC: 3.8 G/DL (ref 3.5–5.2)
ALP SERPL-CCNC: 59 U/L (ref 55–135)
ALT SERPL W/O P-5'-P-CCNC: 14 U/L (ref 10–44)
ANION GAP SERPL CALC-SCNC: 11 MMOL/L (ref 8–16)
AST SERPL-CCNC: 29 U/L (ref 10–40)
BILIRUB SERPL-MCNC: 0.4 MG/DL (ref 0.1–1)
BUN SERPL-MCNC: 41 MG/DL (ref 8–23)
CALCIUM SERPL-MCNC: 10.1 MG/DL (ref 8.7–10.5)
CHLORIDE SERPL-SCNC: 105 MMOL/L (ref 95–110)
CHOLEST SERPL-MCNC: 190 MG/DL (ref 120–199)
CHOLEST/HDLC SERPL: 2.3 {RATIO} (ref 2–5)
CO2 SERPL-SCNC: 25 MMOL/L (ref 23–29)
CREAT SERPL-MCNC: 1.3 MG/DL (ref 0.5–1.4)
EST. GFR  (AFRICAN AMERICAN): 42 ML/MIN/1.73 M^2
EST. GFR  (NON AFRICAN AMERICAN): 36.5 ML/MIN/1.73 M^2
GLUCOSE SERPL-MCNC: 86 MG/DL (ref 70–110)
HDLC SERPL-MCNC: 84 MG/DL (ref 40–75)
HDLC SERPL: 44.2 % (ref 20–50)
LDLC SERPL CALC-MCNC: 98.2 MG/DL (ref 63–159)
NONHDLC SERPL-MCNC: 106 MG/DL
POTASSIUM SERPL-SCNC: 4.8 MMOL/L (ref 3.5–5.1)
PROT SERPL-MCNC: 6.6 G/DL (ref 6–8.4)
SODIUM SERPL-SCNC: 141 MMOL/L (ref 136–145)
T4 FREE SERPL-MCNC: 0.92 NG/DL (ref 0.71–1.51)
TRIGL SERPL-MCNC: 39 MG/DL (ref 30–150)
TSH SERPL DL<=0.005 MIU/L-ACNC: 1.46 UIU/ML (ref 0.4–4)

## 2019-04-29 PROCEDURE — 99214 PR OFFICE/OUTPT VISIT, EST, LEVL IV, 30-39 MIN: ICD-10-PCS | Mod: HCNC,S$GLB,, | Performed by: FAMILY MEDICINE

## 2019-04-29 PROCEDURE — 99499 RISK ADDL DX/OHS AUDIT: ICD-10-PCS | Mod: HCNC,S$GLB,, | Performed by: PHYSICIAN ASSISTANT

## 2019-04-29 PROCEDURE — 99214 OFFICE O/P EST MOD 30 MIN: CPT | Mod: HCNC,S$GLB,, | Performed by: INTERNAL MEDICINE

## 2019-04-29 PROCEDURE — 1101F PR PT FALLS ASSESS DOC 0-1 FALLS W/OUT INJ PAST YR: ICD-10-PCS | Mod: HCNC,CPTII,S$GLB, | Performed by: INTERNAL MEDICINE

## 2019-04-29 PROCEDURE — 99999 PR PBB SHADOW E&M-EST. PATIENT-LVL V: CPT | Mod: PBBFAC,HCNC,, | Performed by: PHYSICIAN ASSISTANT

## 2019-04-29 PROCEDURE — 99999 PR PBB SHADOW E&M-EST. PATIENT-LVL V: ICD-10-PCS | Mod: PBBFAC,HCNC,, | Performed by: PHYSICIAN ASSISTANT

## 2019-04-29 PROCEDURE — 99214 PR OFFICE/OUTPT VISIT, EST, LEVL IV, 30-39 MIN: ICD-10-PCS | Mod: HCNC,S$GLB,, | Performed by: INTERNAL MEDICINE

## 2019-04-29 PROCEDURE — 99499 UNLISTED E&M SERVICE: CPT | Mod: HCNC,S$GLB,, | Performed by: PHYSICIAN ASSISTANT

## 2019-04-29 PROCEDURE — 1101F PT FALLS ASSESS-DOCD LE1/YR: CPT | Mod: HCNC,CPTII,S$GLB, | Performed by: FAMILY MEDICINE

## 2019-04-29 PROCEDURE — G0439 PPPS, SUBSEQ VISIT: HCPCS | Mod: HCNC,S$GLB,, | Performed by: PHYSICIAN ASSISTANT

## 2019-04-29 PROCEDURE — 80061 LIPID PANEL: CPT | Mod: HCNC

## 2019-04-29 PROCEDURE — 84439 ASSAY OF FREE THYROXINE: CPT | Mod: HCNC

## 2019-04-29 PROCEDURE — 1101F PT FALLS ASSESS-DOCD LE1/YR: CPT | Mod: HCNC,CPTII,S$GLB, | Performed by: INTERNAL MEDICINE

## 2019-04-29 PROCEDURE — 99214 OFFICE O/P EST MOD 30 MIN: CPT | Mod: HCNC,S$GLB,, | Performed by: FAMILY MEDICINE

## 2019-04-29 PROCEDURE — 99999 PR PBB SHADOW E&M-EST. PATIENT-LVL III: ICD-10-PCS | Mod: PBBFAC,HCNC,, | Performed by: INTERNAL MEDICINE

## 2019-04-29 PROCEDURE — 84443 ASSAY THYROID STIM HORMONE: CPT | Mod: HCNC

## 2019-04-29 PROCEDURE — 99999 PR PBB SHADOW E&M-EST. PATIENT-LVL III: ICD-10-PCS | Mod: PBBFAC,HCNC,, | Performed by: FAMILY MEDICINE

## 2019-04-29 PROCEDURE — 80053 COMPREHEN METABOLIC PANEL: CPT | Mod: HCNC

## 2019-04-29 PROCEDURE — G0439 PR MEDICARE ANNUAL WELLNESS SUBSEQUENT VISIT: ICD-10-PCS | Mod: HCNC,S$GLB,, | Performed by: PHYSICIAN ASSISTANT

## 2019-04-29 PROCEDURE — 99999 PR PBB SHADOW E&M-EST. PATIENT-LVL III: CPT | Mod: PBBFAC,HCNC,, | Performed by: FAMILY MEDICINE

## 2019-04-29 PROCEDURE — 99999 PR PBB SHADOW E&M-EST. PATIENT-LVL III: CPT | Mod: PBBFAC,HCNC,, | Performed by: INTERNAL MEDICINE

## 2019-04-29 PROCEDURE — 1101F PR PT FALLS ASSESS DOC 0-1 FALLS W/OUT INJ PAST YR: ICD-10-PCS | Mod: HCNC,CPTII,S$GLB, | Performed by: FAMILY MEDICINE

## 2019-04-29 PROCEDURE — 36415 COLL VENOUS BLD VENIPUNCTURE: CPT | Mod: HCNC

## 2019-04-29 RX ORDER — BISACODYL 5 MG
5 TABLET, DELAYED RELEASE (ENTERIC COATED) ORAL DAILY PRN
COMMUNITY

## 2019-04-29 RX ORDER — CLONAZEPAM 0.5 MG/1
0.5 TABLET ORAL 3 TIMES DAILY
Qty: 90 TABLET | Refills: 1 | Status: SHIPPED | OUTPATIENT
Start: 2019-04-29 | End: 2019-05-31 | Stop reason: SDUPTHER

## 2019-04-29 RX ORDER — HYDROCODONE BITARTRATE AND ACETAMINOPHEN 5; 325 MG/1; MG/1
TABLET ORAL
Qty: 75 TABLET | Refills: 0 | Status: SHIPPED | OUTPATIENT
Start: 2019-04-29 | End: 2019-05-10 | Stop reason: SDUPTHER

## 2019-04-29 RX ORDER — CITALOPRAM 40 MG/1
TABLET, FILM COATED ORAL
Qty: 136 TABLET | Refills: 0 | Status: SHIPPED | OUTPATIENT
Start: 2019-04-29 | End: 2019-08-13 | Stop reason: SDUPTHER

## 2019-04-29 NOTE — PROGRESS NOTES
"Anju Rivera presented for a  Medicare AWV and comprehensive Health Risk Assessment today. The following components were reviewed and updated:    · Medical history  · Family History  · Social history  · Allergies and Current Medications  · Health Risk Assessment  · Health Maintenance  · Care Team     ** See Completed Assessments for Annual Wellness Visit within the encounter summary.**       The following assessments were completed:  · Living Situation  · CAGE  · Depression Screening  · Timed Get Up and Go  · Whisper Test  · Cognitive Function Screening  · Nutrition Screening  · ADL Screening  · PAQ Screening    Vitals:    04/29/19 1411   BP: (!) 162/70   Pulse: 78   Temp: 98.5 °F (36.9 °C)   TempSrc: Tympanic   SpO2: 95%   Weight: 36.7 kg (80 lb 14.5 oz)   Height: 5' 3.5" (1.613 m)     Body mass index is 14.11 kg/m².  Physical Exam   Constitutional: She is oriented to person, place, and time. She appears well-developed and well-nourished. No distress.   HENT:   Head: Normocephalic and atraumatic.   Cardiovascular: Normal rate, regular rhythm and normal heart sounds. Exam reveals no gallop and no friction rub.   No murmur heard.  Pulmonary/Chest: Effort normal and breath sounds normal. No respiratory distress. She has no wheezes. She has no rales.   Musculoskeletal: Normal range of motion.   Neurological: She is alert and oriented to person, place, and time.   Skin: Skin is warm. Capillary refill takes less than 2 seconds. No rash noted. She is not diaphoretic.   Psychiatric: She has a normal mood and affect. Her behavior is normal. Judgment and thought content normal.   Nursing note and vitals reviewed.        Diagnoses and health risks identified today and associated recommendations/orders:    1. Encounter for preventive health examination  -completed today  -scheduled for labs today. Ordered by her PCP at her well visit today.    2. Coronary artery disease due to lipid rich plaque  -H/O MI in 1980s.   -Stable " and controlled. Continue current treatment plan as previously prescribed with your PCP and cardiologist.  -on low dose aspirin daily    3. Chronic diastolic heart failure  -2D echo 2/24/2016  -Stable and controlled. Continue current treatment plan as previously prescribed with your cardiologist    4. Non-rheumatic mitral regurgitation  --Stable and controlled. Continue current treatment plan as previously prescribed with your cardiologist.     5. Pulmonary emphysema, unspecified emphysema type  -Stable and controlled on oxygen, symbicort, and duoneb. Continue current treatment plan as previously prescribed with your pulmonologist  -PFT 12/6/2016    6. Essential hypertension  -Stable. A little elevated today. Addressed with PCP at visit prior to this appointment. Continue current treatment plan as previously prescribed with your PCP and cardiologist.    7. Aortic valve disorder  -2D echo 9/11/2018  -Stable and controlled. Continue current treatment plan as previously prescribed with your cardiologist.    8. Pulmonary hypertension  --Stable and controlled. Continue current treatment plan as previously prescribed with your pulmonologist and cardiologist.    9. Mixed hyperlipidemia  -Stable and controlled. Continue current treatment plan as previously prescribed with your PCP.   Lab Results   Component Value Date    CHOL 189 04/17/2018    CHOL 179 08/01/2016    CHOL 185 03/18/2014     Lab Results   Component Value Date    HDL 80 (H) 04/17/2018    HDL 61 08/01/2016    HDL 72 03/18/2014     Lab Results   Component Value Date    LDLCALC 100.6 04/17/2018    LDLCALC 96.2 08/01/2016    LDLCALC 99.6 03/18/2014     Lab Results   Component Value Date    TRIG 42 04/17/2018    TRIG 109 08/01/2016    TRIG 67 03/18/2014     Lab Results   Component Value Date    CHOLHDL 42.3 04/17/2018    CHOLHDL 34.1 08/01/2016    CHOLHDL 38.9 03/18/2014     -scheduled for a recheck today.     10. HX of MI  -1980s.  -Stable and controlled. Continue  current treatment plan as previously prescribed with your cardiologist.    11. Chronic open angle glaucoma of both eyes, unspecified glaucoma stage  -Stable and controlled. Continue current treatment plan as previously prescribed with your ophthalmologist.    12. Acquired hypothyroidism  -Stable and controlled on levothyroxine. Continue current treatment plan as previously prescribed with your PCP.   Lab Results   Component Value Date    TSH 1.740 04/17/2018   -recheck scheduled for today    13. Peripheral vascular disease  -Abnormal KESHA 7/18/2018  -Stable and controlled. Continue current treatment plan as previously prescribed with your PCP.     14. Amputated great toe of left foot  -Stable and controlled. Continue current treatment plan as previously prescribed with your PCP.    15. Anxiety  -Stable and controlled on celexa 40mg. Continue current treatment plan as previously prescribed with your PCP.     16. Shortness of breath  -Stable on oxygen and inhalers. Continue current treatment plan as previously prescribed with your PCP.     17. PVC's (premature ventricular contractions)  -Stable and controlled. Continue current treatment plan as previously prescribed with your PCP.     18. Proteinuria, unspecified type  -Stable and controlled. Continue current treatment plan as previously prescribed with your PCP    19. Chronic kidney disease, stage III (moderate)  -Stable and controlled. Continue current treatment plan as previously prescribed with your nephrologist, Dr. Culver. Last seen 10/26/2018.  Lab Results   Component Value Date    CREATININE 1.3 04/22/2019    BUN 48 (H) 04/22/2019     04/22/2019    K 4.3 04/22/2019     04/22/2019    CO2 24 04/22/2019       20. History of breast cancer  -1996. S/P right mastectomy  -Stable and controlled. Continue current treatment plan as previously prescribed with your PCP.     21. History of skin cancer  -BCC face. Continue current treatment plan as previously  prescribed with your dermatologist    22. Major depressive disorder with single episode, in remission  -not controlled. Scored a 10 on her depression screen today. Advised her to follow up with her PCP.    23. Bilateral carotid artery disease, unspecified type  -U/S carotids 1/11/2012  -Stable and controlled. Continue current treatment plan as previously prescribed with your PCP.     24. Tortuous aorta  -CXR 1/28/2019  -Stable and controlled. Continue current treatment plan as previously prescribed with your PCP.     25. Pain  -reports having pain in her arms and in her legs, chapo at night.   -Stable and controlled. Continue current treatment plan as previously prescribed with your PCP.     26. Primary open angle glaucoma of both eyes, severe stage  -Stable and controlled. Continue current treatment plan as previously prescribed with your ophthalmologist.    27. Pseudophakia of both eyes  -Stable and controlled. Continue current treatment plan as previously prescribed with your ophthalmologist.    28. Refractive error  -Stable and controlled. Continue current treatment plan as previously prescribed with your ophthalmologist.    29. Primary osteoarthritis of right shoulder  -Stable and controlled. Continue current treatment plan as previously prescribed with your PCP.   -PT/OT exercises at home    30. Closed fracture of left upper extremity, initial encounter  -resolved    31. Paronychia of finger of right hand  -resolved    32. Age-related osteoporosis without current pathological fracture  -DEXA 8/15/2017  -Stable and controlled. Continue current treatment plan as previously prescribed with your PCP.     33. Bilateral foot pain  -Stable and controlled. Continue current treatment plan as previously prescribed with your PCP.     34. Chronic right shoulder pain  -Stable and controlled. Continue current treatment plan as previously prescribed with your PCP.   -does at home PT/OT exercises      Provided Anju with a 5-10  year written screening schedule and personal prevention plan. Recommendations were developed using the USPSTF age appropriate recommendations. Education, counseling, and referrals were provided as needed. After Visit Summary printed and given to patient which includes a list of additional screenings\tests needed.    Follow up if symptoms worsen or fail to improve.    Talisha Tracy PA-C  I offered to discuss end of life issues, including information on how to make advance directives that the patient could use to name someone who would make medical decisions on their behalf if they became too ill to make themselves.    ___Patient declined  _X_Patient is interested, I provided paper work and offered to discuss.

## 2019-04-29 NOTE — PROGRESS NOTES
PROGRESS NOTE FOR ESTABLISHED PATIENT    PHYSICIAN REQUESTING THE CONSULT: Dr. Gallardo     REASON FOR VISIT: Renal insufficiency     89 y.o. female with history of CAD, CHF, HTN, pulmonary HTN, COPD, arthritis, breast CA presents to the renal clinic for evaluation of renal insufficiency.     Patient denies any complaints today. She has been using her BP meds on prn basis (followed by her PMD ELLEN Delgado).       Past Medical History    Diagnosis  Date      Coronary artery disease  6/17/2013      Chronic diastolic heart failure  6/17/2013      Mitral regurgitation  6/17/2013      Hypertension  6/17/2013      Aortic valve disorder  6/17/2013      Pulmonary hypertension  6/17/2013      Sinus bradycardia  6/18/2013      COPD (chronic obstructive pulmonary disease)  6/17/2013      Arthritis       Emphysema of lung       copd      Thyroid disease       hypothyroidism      Pneumonia       dx 02/2012      MI (myocardial infarction)  05/1981      Breast cancer  1981      Glaucoma       Cataract       Skin cancer       Past Surgical History    Procedure  Date      Masectomy  1996      right breast      Finger amputation       digits 4, 5      Toe amputation       left foot great hallux      Wrist arthroplasty       left wrist      Orif hip fracture  11/2012      Eye surgery       right cataract removal. lens replacement      Cataract extraction       Allergies    Allergen  Reactions      Atorvastatin  Other (See Comments)      Dry mouth      Lactose  Diarrhea      Current Outpatient Medications   Medication Sig Dispense Refill    albuterol (VENTOLIN HFA) 90 mcg/actuation inhaler Inhale 2 puffs into the lungs every 4 (four) hours as needed for Wheezing. 18 g 3    albuterol-ipratropium (DUO-NEB) 2.5 mg-0.5 mg/3 mL nebulizer solution USE 1 VIAL VIA NEBULIZER EVERY 6 TO 8 HOURS AS NEEDED FOR WHEEZING 360 mL 6    allopurinol (ZYLOPRIM) 100 MG tablet TAKE 2 TABLETS BY MOUTH ONCE DAILY 180 tablet 0     aspirin (ECOTRIN) 81 MG EC tablet Take 81 mg by mouth once daily.      bisacodyl (DULCOLAX) 5 mg EC tablet Take 5 mg by mouth daily as needed for Constipation.      CARBOXYMETHYLCELLULOSE SODIUM (REFRESH OPHT) Apply 1 drop to eye daily as needed.       citalopram (CELEXA) 40 MG tablet TAKE 1 TABLET BY MOUTH DAILY 136 tablet 0    clonazePAM (KLONOPIN) 0.5 MG tablet Take 1 tablet (0.5 mg total) by mouth 3 (three) times daily. 90 tablet 1    HYDROcodone-acetaminophen (NORCO) 5-325 mg per tablet Take 1/2 to 1 tablet one time during the the day as needed and 1 1/2 tablet at night 75 tablet 0    latanoprost 0.005 % ophthalmic solution INSTILL 1 DROP IN EACH EYE EVERY EVENING 2.5 mL 6    levothyroxine (SYNTHROID) 50 MCG tablet TAKE 1 TABLET BY MOUTH ONCE DAILY 90 tablet 0    levothyroxine (SYNTHROID) 50 MCG tablet TAKE 1 TABLET BY MOUTH ONCE DAILY 90 tablet 0    levothyroxine (SYNTHROID) 50 MCG tablet TAKE 1 TABLET BY MOUTH ONCE DAILY 90 tablet 2    losartan (COZAAR) 50 MG tablet TAKE 1 TABLET(50 MG) BY MOUTH EVERY DAY 90 tablet 1    MULTIVITAMIN Take 1 tablet by mouth Daily.      psyllium (METAMUCIL) powder Take 1 packet by mouth once daily.      SYMBICORT 80-4.5 mcg/actuation HFAA INHALE 2 PUFFS INTO THE LUNGS TWICE DAILY. RINSE MOUTH AFTER USE 10.2 g 6     No current facility-administered medications for this visit.          Family History    Problem  Relation  Age of Onset      Heart attack  Mother       Hypertension  Mother       Stroke  Mother       Arthritis  Father       Glaucoma  Daughter         h/o narrow angle        Strabismus  Neg Hx       Retinal detachment  Neg Hx       Macular degeneration  Neg Hx       Blindness  Neg Hx       Amblyopia  Neg Hx       History      Social History      Marital Status:        Spouse Name:  N/A      Number of Children:  N/A      Years of Education:  N/A      Occupational History      Not on file.      Social History Main Topics      Smoking  "status:  Former Smoker -- 3.0 packs/day for 40 years      Smokeless tobacco:  Never Used      Alcohol Use:  Yes       Comment: social use of wine      Drug Use:  No      Sexually Active:  Not Currently      Birth Control/ Protection:  None      Other Topics  Concern      Not on file      Social History Narrative      No narrative on file      Review of Systems:   1. GENERAL: patient denies any fever, weight changes, generalized weakness, dizziness.   2. HEENT: patient denies headaches, visual disturbances, swallowing problems, sinus pain, nasal congestion.   3. CARDIOVASCULAR: patient denies chest pain, palpitations.   4. PULMONARY: patient denies current SOB, she denies coughing, hemoptysis, wheezing.   5. GASTROINTESTINAL: patient denies abdominal pain, nausea, vomiting, diarrhea.   6. GENITOURINARY: patient denies dysuria, hematuria, hesitancy, frequency  7. EXTREMITIES: patient denies LE edema, LE cramping  8. DERMATOLOGY: patient denies rashes, ulcers.   9. NEURO: patient denies tremors, extremity weakness, extremity numbness/tingling.   10. MUSCULOSKELETAL: patient denies joint swelling  11. HEMATOLOGY: patient denies rectal or gum bleeding.   12: PSYCH: patient denies anxiety, depression.       PHYSICAL EXAM:   BP (!) 160/80   Pulse 74   Ht 5' 4" (1.626 m)   Wt 36.7 kg (80 lb 14.5 oz)   BMI 13.89 kg/m²       GENERAL: Emaciated lady presents to clinic with non-labored breathing and wheelchair, getting O2 via nasal cannula  HEENT: PERRL, no nasal discharge, no icterus, no oral exudates, moist mucosal membranes.   NECK: no thyroid mass, no lymphadenopathy.   HEART: RRR S1/S2, no rubs, good peripheral pulses.   LUNGS: CTA bilaterally, no wheezing, breathing is nonlabored.   ABDOMEN: soft, nontender, not distended, bowel sounds are present, no abdominal hernia.   EXTREM: no LE edema  SKIN: no rashes, skin is warm and dry.   NEURO: Alert, responsive    LABORATORY RESULTS:   Lab Results   Component Value " Date    CREATININE 1.3 04/22/2019    BUN 48 (H) 04/22/2019     04/22/2019    K 4.3 04/22/2019     04/22/2019    CO2 24 04/22/2019     Lab Results   Component Value Date    ALBUMIN 3.1 (L) 04/22/2019     Lab Results   Component Value Date    PTH 93 (H) 05/01/2014    CALCIUM 9.3 04/22/2019    PHOS 3.4 04/22/2019     Lab Results   Component Value Date    WBC 5.77 10/19/2017    HGB 10.2 (L) 10/19/2017    HCT 32.9 (L) 10/19/2017    MCV 99 (H) 10/19/2017     10/19/2017       Renal ultrasound from 1/18/12: right kidney 9 cm, left kidney 7.2 cm, bilateral simple cysts.     Protein Creatinine Ratios:    Creatinine, Random Ur   Date Value Ref Range Status   04/22/2019 29.0 15.0 - 325.0 mg/dL Final     Comment:     The random urine reference ranges provided were established   for 24 hour urine collections.  No reference ranges exist for  random urine specimens.  Correlate clinically.     10/02/2018 20.0 15.0 - 325.0 mg/dL Final     Comment:     The random urine reference ranges provided were established   for 24 hour urine collections.  No reference ranges exist for  random urine specimens.  Correlate clinically.     04/17/2018 23.0 15.0 - 325.0 mg/dL Final     Comment:     The random urine reference ranges provided were established   for 24 hour urine collections.  No reference ranges exist for  random urine specimens.  Correlate clinically.       Protein, Urine   Date Value Ref Range Status   03/26/2008 <5 (A) 0 - 10 mg/dl Final     Protein, Urine Random   Date Value Ref Range Status   04/22/2019 17 (H) 0 - 15 mg/dL Final     Comment:     The random urine reference ranges provided were established   for 24 hour urine collections.  No reference ranges exist for  random urine specimens.  Correlate clinically.     10/02/2018 12 0 - 15 mg/dL Final     Comment:     The random urine reference ranges provided were established   for 24 hour urine collections.  No reference ranges exist for  random urine specimens.   Correlate clinically.     04/17/2018 9 0 - 15 mg/dL Final     Comment:     The random urine reference ranges provided were established   for 24 hour urine collections.  No reference ranges exist for  random urine specimens.  Correlate clinically.       Prot/Creat Ratio, Ur   Date Value Ref Range Status   04/22/2019 0.59 (H) 0.00 - 0.20 Final   10/02/2018 0.60 (H) 0.00 - 0.20 Final   04/17/2018 0.39 (H) 0.00 - 0.20 Final         ASSESSMENT AND PLAN:   89 y.o. female with history of CAD, CHF, HTN, pulmonary HTN, COPD, arthritis, breast CA presents to the renal clinic for evaluation of renal insufficiency.     1. Renal insufficiency: Patient's renal function has stabilized with creatinine at 1.3. Patient was advised to hydrate with 50 ounces of water per day. Protein creatinine ratio was 0.59. Continue Losartan.   Patient's renal function will be monitored closely and she will return to the clinic in 9 months for follow up.     2. Electrolytes: borderline hyperkalemia has resolved with low K diet which will be continued.     3. Acid base status: no issues.    4. Volume: Euvolemic.     5. Hypertension: BP elevated today. Patient is on prn BP meds and is followed by Dr. Delgado. Agree with this treatment protocol given her advanced age and overall stable state.     6. Medications: Reviewed.     7. Anemia: stable Hgb.

## 2019-04-29 NOTE — PATIENT INSTRUCTIONS
Counseling and Referral of Other Preventative  (Italic type indicates deductible and co-insurance are waived)    Patient Name: Anju Rivera  Today's Date: 4/29/2019    Health Maintenance       Date Due Completion Date    TETANUS VACCINE 07/21/1947 ---    Sign Pain Contract 07/21/1947 ---    Urine Drug Screen 07/21/1947 ---    Naloxone Prescription 07/21/1947 ---    Lipid Panel 04/17/2019 4/17/2018    DEXA SCAN 08/15/2019 8/15/2017    Aspirin/Antiplatelet Therapy 04/29/2020 4/29/2019        No orders of the defined types were placed in this encounter.    The following information is provided to all patients.  This information is to help you find resources for any of the problems found today that may be affecting your health:                Living healthy guide: www.[facility].[place].gov      Understanding Diabetes: www.diabetes.org      Eating healthy: www.cdc.gov/healthyweight      [facility] home safety checklist: www.cdc.gov/steadi/patient.html      Agency on Aging: www.goea.[place].[facility]      Alcoholics anonymous (AA): www.aa.org      Physical Activity: www.darien.nih.gov/lt6whvj      Tobacco use: www.quitwithusla.org

## 2019-04-29 NOTE — PROGRESS NOTES
Subjective:       Patient ID: Anju Rivera is a 89 y.o. female.    Chief Complaint: Follow-up (6 month follow-up)    F/U:      Pt is a 89 year who has chronic pain, HTN and  Anxiety. Pt is on 02 2 L. Pt has hypothyroidism that has been well controlled.    Review of Systems   Constitutional: Negative for activity change and unexpected weight change.   HENT: Negative for hearing loss, rhinorrhea and trouble swallowing.    Eyes: Negative for discharge and visual disturbance.   Respiratory: Negative for chest tightness and wheezing.    Cardiovascular: Negative for chest pain and palpitations.   Gastrointestinal: Negative for blood in stool, constipation, diarrhea and vomiting.   Endocrine: Negative for polydipsia and polyuria.   Genitourinary: Negative for difficulty urinating, dysuria, hematuria and menstrual problem.   Musculoskeletal: Negative for arthralgias, joint swelling and neck pain.   Neurological: Negative for weakness and headaches.   Psychiatric/Behavioral: Negative for confusion and dysphoric mood.       Objective:      Physical Exam   Constitutional: She is oriented to person, place, and time. She appears well-developed and well-nourished.   Cardiovascular: Normal rate and regular rhythm. Exam reveals no friction rub.   No murmur heard.  Pulmonary/Chest: Effort normal and breath sounds normal. No stridor. She has no wheezes.   Abdominal: Soft. Bowel sounds are normal. There is no tenderness. There is no guarding.   Musculoskeletal: Normal range of motion.   Neurological: She is alert and oriented to person, place, and time.   Skin: Skin is warm and dry.   Psychiatric: She has a normal mood and affect. Her behavior is normal.       Assessment:       1. Chronic obstructive pulmonary disease, unspecified COPD type    2. Major depressive disorder with single episode, in remission    3. Chronic diastolic heart failure    4. Chronic kidney disease, stage III (moderate)    5. Pulmonary hypertension    6.  Tortuous aorta    7. Peripheral vascular disease    8. Amputated great toe of left foot    9. Pain    10. Hypothyroidism, unspecified type    11. Essential hypertension        Plan:       Chronic obstructive pulmonary disease, unspecified COPD type  Comments:  Is stable    Major depressive disorder with single episode, in remission  Comments:  Stable at this point    Chronic diastolic heart failure  Comments:  Stable    Chronic kidney disease, stage III (moderate)  Comments:  kidney function is stable    Pulmonary hypertension    Tortuous aorta  Comments:  Stable    Peripheral vascular disease  Comments:  stable    Amputated great toe of left foot    Pain  Comments:  Will continue with the norco.    Hypothyroidism, unspecified type  -     T4, free; Future; Expected date: 04/29/2019  -     TSH; Future; Expected date: 04/29/2019  -     Lipid panel; Future; Expected date: 04/29/2019    Essential hypertension  -     [facility] Enrollment Order  -     Hypertension Digital Medicine ([facility]): Assign Onboarding Questionnaires    Other orders  -     clonazePAM (KLONOPIN) 0.5 MG tablet; Take 1 tablet (0.5 mg total) by mouth 3 (three) times daily.  Dispense: 90 tablet; Refill: 1  -     HYDROcodone-acetaminophen (NORCO) 5-325 mg per tablet; Take 1/2 to 1 tablet one time during the the day as needed and 1 1/2 tablet at night  Dispense: 75 tablet; Refill: 0

## 2019-05-01 ENCOUNTER — PATIENT MESSAGE (OUTPATIENT)
Dept: ADMINISTRATIVE | Facility: OTHER | Age: 84
End: 2019-05-01

## 2019-05-06 ENCOUNTER — PATIENT OUTREACH (OUTPATIENT)
Dept: OTHER | Facility: OTHER | Age: 84
End: 2019-05-06

## 2019-05-06 NOTE — LETTER
May 8, 2019     Anju Rivera  [address] [address]       Dear Anju,    Welcome to [facility]! Our goal is to make care effective, proactive and convenient by using data you send us from home to better treat your chronic conditions.          My name is Dali Rios, and I am your dedicated Digital Medicine clinician. As an expert in medication management, I will help ensure that the medications you are taking continue to provide the intended benefits and help you reach your goals. You can reach me directly at [phone] or by sending me a message directly through your [facility] account.      I am Harshad Rasmussen and I will be your health . My job is to help you identify lifestyle changes to improve your disease control. We will talk about nutrition, exercise, and other ways you may be able to adjust your current habits to better your health. Additionally, we will help ensure you are completing the tests and screenings that are necessary to help manage your conditions. You can reach me directly at [phone] or by sending me a message directly through your [facility] account.    Most importantly, YOU are at the center of this team. Together, we will work to improve your overall health and encourage you to meet your goals for a healthier lifestyle.     What we expect from YOU:  · Please take frequent home blood pressure measurements. We ask that you take at least 1 blood pressure reading per week, but more information will better help us get you know you. Be sure you rest for a few minutes before taking the reading in a quiet, comfortable place.     Be available to receive phone calls or [facility] messages, when appropriate, from your care team. Please let us know if there are any specific days or times that work best for us to reach you via phone.     Complete routine tests and screenings. Dont worry, we will help keep you on track!           What you should  expect from your Digital Medicine Care Team:   We will work with you to create a personalized plan of care and provide you with encouragement and education, including regarding lifestyle changes, that could help you manage your disease states.     We will adjust your current medications, if needed, and continue to monitor your long-term progress.     We will provide you and your physician with monthly progress reports after you have been in the program for more than 30 days.     We will send you reminders through [facility] and text messages to help ensure you do not miss any testing deadlines to help manage your disease states.    You will be able to reach us by phone or through your [facility] account by clicking our names under Care Team on the right side of the home screen.    I look forward to working with you to achieve your blood pressure goals!    We look forward to working with you to help manage your health,    Sincerely,    Your Digital Medicine Team    Please visit our websites to learn more:   · Hypertension: [email or web address]      Remember, we are not available for emergencies. If you have an emergency, please contact your doctors office directly or call [facility] on[facility] ([phone] or [phone]) or 161.

## 2019-05-06 NOTE — PROGRESS NOTES
1st attempt for enrollment call. Left voicemail.         Last 5 Patient Entered Readings                                      Current 30 Day Average: 150/77     Recent Readings 5/5/2019 5/4/2019 5/3/2019    SBP (mmHg) 157 155 138    DBP (mmHg) 72 76 82    Pulse 68 67 73

## 2019-05-08 ENCOUNTER — PATIENT MESSAGE (OUTPATIENT)
Dept: INTERNAL MEDICINE | Facility: CLINIC | Age: 84
End: 2019-05-08

## 2019-05-08 ENCOUNTER — TELEPHONE (OUTPATIENT)
Dept: INTERNAL MEDICINE | Facility: CLINIC | Age: 84
End: 2019-05-08

## 2019-05-08 DIAGNOSIS — G89.29 CHRONIC RIGHT SHOULDER PAIN: Primary | ICD-10-CM

## 2019-05-08 DIAGNOSIS — M25.511 CHRONIC RIGHT SHOULDER PAIN: Primary | ICD-10-CM

## 2019-05-08 DIAGNOSIS — M79.671 BILATERAL FOOT PAIN: ICD-10-CM

## 2019-05-08 DIAGNOSIS — M79.672 BILATERAL FOOT PAIN: ICD-10-CM

## 2019-05-08 NOTE — TELEPHONE ENCOUNTER
S/w patient's daughter regarding refill for hydrocodone. Daughter states she was told by Dr. Delgado to  refill after May 7th. States she tried to get in touch with pharmacy but was unsuccessful. Advised daughter to give pharmacy a visit and I will try contacting them. Daughter verbalized understanding.      Contacted pharmacy to see if they received request. No answer. Left voicemail for a call back.

## 2019-05-08 NOTE — PROGRESS NOTES
Digital Medicine Enrollment Call    Introduced Mrs. Anju Rivera to Digital Medicine.     Discussed program expectations and requirements.    Introduced digital medicine care team.     Reviewed the importance of self-monitoring for digital medicine participation.     Reviewed that the Digital Medicine team is not available for emergencies and instructed the patient to call 911 or [facility] ([phone] or [phone]) if one arises.    Pt's daughter, Violetta Mao, will be monitoring the pt's BP and communicating with the care team. Violetta reports that the pt is mobility impaired, so she will take her BP measurements while sitting in the bed or her rocking chair.           Last 5 Patient Entered Readings                                      Current 30 Day Average: 151/79     Recent Readings 5/8/2019 5/8/2019 5/7/2019 5/6/2019 5/6/2019    SBP (mmHg) 160 187 158 139 165    DBP (mmHg) 80 87 74 68 83    Pulse 70 71 62 66 66

## 2019-05-10 ENCOUNTER — PATIENT OUTREACH (OUTPATIENT)
Dept: OTHER | Facility: OTHER | Age: 84
End: 2019-05-10

## 2019-05-10 RX ORDER — HYDROCODONE BITARTRATE AND ACETAMINOPHEN 5; 325 MG/1; MG/1
TABLET ORAL
Qty: 75 TABLET | Refills: 0 | Status: SHIPPED | OUTPATIENT
Start: 2019-05-10 | End: 2019-06-10 | Stop reason: SDUPTHER

## 2019-05-10 NOTE — PROGRESS NOTES
Called patient to introduce her to the Hypertension Digital Medicine Program. Reached out due to repeated elevated readings patient submitted.     Spoke to Violetta, patient's daughter and full time care taker.     Per daughter, Violetta, Ms. Rene was eating breakfast and had a choking episode with lots of coughing while trying to swallow medications. Patient struggled to get breathing under control. Violetta got patient her Ventolin inhaler and patient used it. Ms. Rene spoke to me on speaker and endorses feeling fine and having no signs/symptoms of hypertension. Signs to be aware of, including HA, SOB, changes in vision, and CP were reviewed with Violetta and Ms. Rene.      Reviewed patient's allergies on file.     Hypertension Medications             losartan (COZAAR) 50 MG tablet TAKE 1 TABLET(50 MG) BY MOUTH EVERY DAY        Last 5 Patient Entered Readings                                      Current 30 Day Average: 158/85     Recent Readings 5/10/2019 5/10/2019 5/10/2019 5/10/2019 5/9/2019    SBP (mmHg) 191 214 222 205 167    DBP (mmHg) 97 102 107 108 76    Pulse 72 81 76 78 71      Reviewed goal BP of <130/80, but for elderly patient with multiple dx, will not push hard after reaching <140/90.   Violetta reports compliance with losartan 50 mg QD.     Plan:  1. Continue to obtain several blood pressures/week at random times of day. Also asked that the BP be taken at least 1 hour after taking BP medications.     2. Emailed patient's daughter link to [facility] HTN webpage as well as my direct phone number in case she has any questions.     3. Violetta would like 1 week to monitor BP at different times of the day before making medication changes due to previous hypotensive episodes. Will call back in 1 week (sooner if readings do not trend down) and review submissions. Plan as of now if to increase losartan to 100 mg daily.

## 2019-05-16 ENCOUNTER — PATIENT OUTREACH (OUTPATIENT)
Dept: OTHER | Facility: OTHER | Age: 84
End: 2019-05-16

## 2019-05-16 NOTE — PROGRESS NOTES
Per 30 day average, 158/85 mmHg, patient's BP is not at goal.       Last 5 Patient Entered Readings                                      Current 30 Day Average: 158/85     Recent Readings 5/15/2019 5/15/2019 5/14/2019 5/14/2019 5/13/2019    SBP (mmHg) 132 164 169 170 177    DBP (mmHg) 67 93 85 93 94    Pulse 69 76 62 62 75          Patient denies s/s of hypotension (lightheadedness, dizziness, nausea, fatigue) associated with low readings. Instructed patient to inform me if this occurs, patient confirms understanding.      Patient denies s/s of hypertension (SOB, CP, severe headaches, changes in vision) associated with high readings. Instructed patient to go to the ED if BP > 180/110 and accompanied by hypertensive s/s, patient confirms understanding.    Will continue to monitor regularly. Will follow up in 2-3 weeks, sooner if BP begins to trend upward or downward.    Asked patient to call or message with questions or concerns.     Current HTN regimen:  Hypertension Medications             losartan (COZAAR) 50 MG tablet TAKE 1 TABLET(50 MG) BY MOUTH EVERY DAY        Plan:  increase losartan to 100 mg daily using BID dosing.

## 2019-05-20 NOTE — PROGRESS NOTES
Per 30 day average, 157/83 mmHg, patient's BP is not at goal.       Last 5 Patient Entered Readings                                      Current 30 Day Average: 157/83     Recent Readings 5/20/2019 5/20/2019 5/19/2019 5/17/2019 5/16/2019    SBP (mmHg) 159 163 160 145 152    DBP (mmHg) 73 91 69 75 69    Pulse 70 72 66 76 65          Patient denies s/s of hypotension (lightheadedness, dizziness, nausea, fatigue) associated with low readings. Instructed patient to inform me if this occurs, patient confirms understanding.      Patient denies s/s of hypertension (SOB, CP, severe headaches, changes in vision) associated with high readings. Instructed patient to go to the ED if BP > 180/110 and accompanied by hypertensive s/s, patient confirms understanding.    Current HTN regimen:  Hypertension Medications             losartan (COZAAR) 50 MG tablet TAKE 1 TABLET(50 MG) BY MOUTH EVERY DAY        Patient's daughter Violetta states they are using the losartan PRN when they feel she needs it. I explained that losartan is not appropriate for use as a PRN medication. Violetta states understanding, but expressed concern for her mother taking so many medications in the morning, some she feels are affecting her blood pressure, so she wasn't comfortable giving it to her each morning.     We discussed giving it in the evening daily, as scheduled. Violetta agreed to try for 2 weeks and monitor results.     Will continue to monitor regularly. Will follow up in 2 weeks, sooner if BP begins to trend upward or downward.    Asked patient and daughter to call or message with questions or concerns.

## 2019-05-24 ENCOUNTER — PATIENT OUTREACH (OUTPATIENT)
Dept: OTHER | Facility: OTHER | Age: 84
End: 2019-05-24

## 2019-05-24 NOTE — LETTER
May 24, 2019     Anju Rivera  [address] [facility] [address]       Dear Anju,    [facility]! Our goal is to make care effective, proactive and convenient by using data you send us from home to better treat your chronic conditions.          My name is Dali Rios, and I am your dedicated Digital Medicine clinician. As an expert in medication management, I will help ensure that the medications you are taking continue to provide the intended benefits and help you reach your goals. You can reach me directly at [phone] or by sending me a message directly through your [facility] account.      I am Harshad Rasmussen and I will be your health . My job is to help you identify lifestyle changes to improve your disease control. We will talk about nutrition, exercise, and other ways you may be able to adjust your current habits to better your health. Additionally, we will help ensure you are completing the tests and screenings that are necessary to help manage your conditions. You can reach me directly at [phone] or by sending me a message directly through your [facility] account.    Most importantly, YOU are at the center of this team. Together, we will work to improve your overall health and encourage you to meet your goals for a healthier lifestyle.     What we expect from YOU:  · Please take frequent home blood pressure measurements. We ask that you take at least 1 blood pressure reading per week, but more information will better help us get you know you. Be sure you rest for a few minutes before taking the reading in a quiet, comfortable place.     Be available to receive phone calls or [facility] messages, when appropriate, from your care team. Please let us know if there are any specific days or times that work best for us to reach you via phone.     Complete routine tests and screenings. Dont worry, we will help keep you on track!           What you  should expect from your Digital Medicine Care Team:   We will work with you to create a personalized plan of care and provide you with encouragement and education, including regarding lifestyle changes, that could help you manage your disease states.     We will adjust your current medications, if needed, and continue to monitor your long-term progress.     We will provide you and your physician with monthly progress reports after you have been in the program for more than 30 days.     We will send you reminders through [facility] and text messages to help ensure you do not miss any testing deadlines to help manage your disease states.    You will be able to reach us by phone or through your [facility] account by clicking our names under Care Team on the right side of the home screen.    I look forward to working with you to achieve your blood pressure goals!    We look forward to working with you to help manage your health,    Sincerely,    Your Digital Medicine Team    Please visit our websites to learn more:   · Hypertension: [email or web address]      Remember, we are not available for emergencies. If you have an emergency, please contact your doctors office directly or call [facility] on-call ([phone] or [phone]) or [phone].

## 2019-05-24 NOTE — PROGRESS NOTES
"Last 5 Patient Entered Readings                                      Current 30 Day Average: 156/82     Recent Readings 5/23/2019 5/22/2019 5/21/2019 5/21/2019 5/20/2019    SBP (mmHg) 147 138 165 166 159    DBP (mmHg) 74 68 81 68 73    Pulse 80 70 60 68 70        Digital Medicine: Health  Follow Up    Lifestyle Modifications:    1.Dietary Modifications (Sodium intake <2,000mg/day, food labels, dining out): Spoke with the patient and her daughter about salt intake. Patient usually doesn't salt food and daughter is working with patient to read labels on bread  and other processed food that they may eat. The daughter - Violetta - is the live-in caretaker and reports that the nephrologist recommends that the patient has 60-66 oz of liquids per day, and I advised the importance of 64 oz per day for BP management. The patient also recently cut back to drinking only one cup of coffee per day and feels less shakey, according to Caroline. Patient is "sometimes" having a glass of wine with dinner. Caroline would like to work on ensuring that the patient stays hydrated.     2.Physical Activity: Patient is generally confined to one room and uses a walker. It is difficult for her to leave her room as it can cause an aggravation in COPD symptoms, according to Caroline.     3.Medication Therapy: Patient has been compliant with the medication regimen.    4.Patient has the following medication side effects/concerns: Patient did not report any side effects.   (Frequency/Alleviating factors/Precipitating factors, etc.)     Reviewed cuff fit and placement technique as well as waiting 30 minutes after eating or caffeine to take the reading. Patient's daughter or son-in-law are taking the readings.     Follow up with Mrs. Anju Lainezport completed. No further questions or concerns. Will continue to follow up to achieve health goals.            "

## 2019-05-27 ENCOUNTER — PATIENT OUTREACH (OUTPATIENT)
Dept: OTHER | Facility: OTHER | Age: 84
End: 2019-05-27

## 2019-05-27 NOTE — PROGRESS NOTES
Called patient's daughter to follow up on changing timing of losartan to PM.     Per 30 day average, 155/80 mmHg, patient's BP is not at goal, but often times has multiple readings within her goal BP of <140/90.     Last 5 Patient Entered Readings                                      Current 30 Day Average: 155/80     Recent Readings 5/27/2019 5/26/2019 5/24/2019 5/24/2019 5/24/2019    SBP (mmHg) 132 133 178 154 151    DBP (mmHg) 71 68 96 68 84    Pulse 68 67 69 58 60        Current HTN regimen:  Hypertension Medications             losartan (COZAAR) 50 MG tablet TAKE 1 TABLET(50 MG) BY MOUTH EVERY DAY        Patient had 1 episode of faintness accompanied with sweating 3-4 days ago. BG was not tested. Patient was brought to the bed and rested and has felt fine since.   Patient is doing well overall. No changes to medications at this time.     Will continue to monitor regularly. Will follow up in 3-4 months, sooner if BP begins to trend upward or downward.    Asked patient and caretaker to call or message with questions or concerns.

## 2019-06-03 RX ORDER — CLONAZEPAM 0.5 MG/1
0.5 TABLET ORAL 3 TIMES DAILY
Qty: 90 TABLET | Refills: 1 | Status: SHIPPED | OUTPATIENT
Start: 2019-06-03 | End: 2019-07-02 | Stop reason: SDUPTHER

## 2019-06-07 ENCOUNTER — PATIENT OUTREACH (OUTPATIENT)
Dept: OTHER | Facility: OTHER | Age: 84
End: 2019-06-07

## 2019-06-07 NOTE — PROGRESS NOTES
"Last 5 Patient Entered Readings                                      Current 30 Day Average: 151/79     Recent Readings 6/4/2019 6/4/2019 6/4/2019 6/3/2019 6/2/2019    SBP (mmHg) 113 178 167 137 144    DBP (mmHg) 49 87 75 78 70    Pulse 76 59 60 75 71          LVM to complete coaching follow up call.  6/14 @ 10:24am - " - patient's daughter called back to leave voice message  6/21 @ 10:10am -     Digital Medicine: Health  Follow Up    Lifestyle Modifications:    1.Dietary Modifications (Sodium intake <2,000mg/day, food labels, dining out): Patient's daughter is keeping the salt below 2000mg per day and is ensuring that she has 60-66 oz of total liquids per day, as the patient's nephrologist recommends.     2.Physical Activity: Patient has COPD and is usually seated.    3.Medication Therapy: Patient has been compliant with the medication regimen.    4.Patient has the following medication side effects/concerns: Patient had some questions about medications, though she described them as "not urgent", and that she prefers to wait until the digital medicine pharmacist checks in with her again to discuss her questions.   (Frequency/Alleviating factors/Precipitating factors, etc.)     Patient's daughter has noticed that her mother's blood pressure is in an overall lowered trend and we reviewed the program goal of under 130/80, and that the diastolic value is more frequently below the 80 target, and that is effected by diet.     Follow up with Mrs. Anju Lainezport completed. No further questions or concerns. Will continue to follow up to achieve health goals.    "

## 2019-06-10 DIAGNOSIS — M79.672 BILATERAL FOOT PAIN: ICD-10-CM

## 2019-06-10 DIAGNOSIS — M25.511 CHRONIC RIGHT SHOULDER PAIN: ICD-10-CM

## 2019-06-10 DIAGNOSIS — M79.671 BILATERAL FOOT PAIN: ICD-10-CM

## 2019-06-10 DIAGNOSIS — G89.29 CHRONIC RIGHT SHOULDER PAIN: ICD-10-CM

## 2019-06-10 NOTE — TELEPHONE ENCOUNTER
Confirmed patient's preferred pharmacy, allergies, and current medications in Epic.    Forwarded rx refill request to Dr. Delgado for review.

## 2019-06-11 RX ORDER — HYDROCODONE BITARTRATE AND ACETAMINOPHEN 5; 325 MG/1; MG/1
TABLET ORAL
Qty: 75 TABLET | Refills: 0 | Status: SHIPPED | OUTPATIENT
Start: 2019-06-11 | End: 2019-07-11 | Stop reason: SDUPTHER

## 2019-06-17 RX ORDER — ALLOPURINOL 100 MG/1
TABLET ORAL
Qty: 180 TABLET | Refills: 1 | Status: SHIPPED | OUTPATIENT
Start: 2019-06-17 | End: 2019-12-12 | Stop reason: SDUPTHER

## 2019-06-27 DIAGNOSIS — J44.9 CHRONIC OBSTRUCTIVE PULMONARY DISEASE, UNSPECIFIED COPD TYPE: ICD-10-CM

## 2019-06-28 RX ORDER — BUDESONIDE AND FORMOTEROL FUMARATE DIHYDRATE 80; 4.5 UG/1; UG/1
AEROSOL RESPIRATORY (INHALATION)
Qty: 10.2 G | Refills: 11 | Status: SHIPPED | OUTPATIENT
Start: 2019-06-28 | End: 2020-02-25 | Stop reason: CLARIF

## 2019-07-01 ENCOUNTER — OFFICE VISIT (OUTPATIENT)
Dept: OPHTHALMOLOGY | Facility: CLINIC | Age: 84
End: 2019-07-01
Payer: MEDICARE

## 2019-07-01 DIAGNOSIS — Z96.1 PSEUDOPHAKIA OF BOTH EYES: ICD-10-CM

## 2019-07-01 DIAGNOSIS — H40.1133 PRIMARY OPEN ANGLE GLAUCOMA OF BOTH EYES, SEVERE STAGE: Primary | ICD-10-CM

## 2019-07-01 PROCEDURE — 99999 PR PBB SHADOW E&M-EST. PATIENT-LVL II: CPT | Mod: PBBFAC,HCNC,, | Performed by: OPHTHALMOLOGY

## 2019-07-01 PROCEDURE — 99999 PR PBB SHADOW E&M-EST. PATIENT-LVL II: ICD-10-PCS | Mod: PBBFAC,HCNC,, | Performed by: OPHTHALMOLOGY

## 2019-07-01 PROCEDURE — 92012 INTRM OPH EXAM EST PATIENT: CPT | Mod: HCNC,S$GLB,, | Performed by: OPHTHALMOLOGY

## 2019-07-01 PROCEDURE — 92012 PR EYE EXAM, EST PATIENT,INTERMED: ICD-10-PCS | Mod: HCNC,S$GLB,, | Performed by: OPHTHALMOLOGY

## 2019-07-01 RX ORDER — LATANOPROST 50 UG/ML
1 SOLUTION/ DROPS OPHTHALMIC NIGHTLY
Qty: 2.5 ML | Refills: 12 | Status: SHIPPED | OUTPATIENT
Start: 2019-07-01 | End: 2020-12-01

## 2019-07-01 NOTE — PROGRESS NOTES
SUBJECTIVE:   Anju Rivera is a 89 y.o. female   Corrected distance visual acuity was 20/50 in the right eye and 20/30 in the left eye.   Chief Complaint   Patient presents with    Glaucoma     3M IOP check        HPI:  HPI     Glaucoma      Additional comments: 3M IOP check              Comments     The patient states her eyes are doing well but she does get pain at times   in her right eye.  100% drop compliance    1. PCIOL OU  2. Severe Coag with enlarged cups (init 38/18) Goal < 16  Vf progression OD 11/17 add Brimonidine  Brimonidine-D/C due to drowsiness,redness,irritation   Rhopressa QHS OU (not using due to redness)  3. PVD OD  4. H/O narrow angles  5. Blepharitis  6 YUNG OU      Latanoprost qhs OU  Systane PRN          Last edited by Leticia Crump on 7/1/2019  1:52 PM. (History)        Assessment /Plan :  1. Primary open angle glaucoma of both eyes, severe stage Doing well, IOP within acceptable range relative to target IOP and no evidence of progression. Continue current treatment. Reviewed importance of continued compliance with treatment and follow up.  Continue Latanoprost ou qhs   2. Pseudophakia of both eyes  -- Condition stable, no therapeutic change required. Monitoring routinely.       Return to clinic in 3 months  or as needed.  With IOP Check and GOCT

## 2019-07-02 RX ORDER — CLONAZEPAM 0.5 MG/1
0.5 TABLET ORAL 3 TIMES DAILY
Qty: 90 TABLET | Refills: 1 | Status: SHIPPED | OUTPATIENT
Start: 2019-07-02 | End: 2019-08-03 | Stop reason: SDUPTHER

## 2019-07-07 ENCOUNTER — PATIENT MESSAGE (OUTPATIENT)
Dept: INTERNAL MEDICINE | Facility: CLINIC | Age: 84
End: 2019-07-07

## 2019-07-08 RX ORDER — LOSARTAN POTASSIUM 50 MG/1
TABLET ORAL
Qty: 90 TABLET | Refills: 1 | Status: SHIPPED | OUTPATIENT
Start: 2019-07-08 | End: 2020-01-08

## 2019-07-11 DIAGNOSIS — G89.29 CHRONIC RIGHT SHOULDER PAIN: ICD-10-CM

## 2019-07-11 DIAGNOSIS — M79.672 BILATERAL FOOT PAIN: ICD-10-CM

## 2019-07-11 DIAGNOSIS — M25.511 CHRONIC RIGHT SHOULDER PAIN: ICD-10-CM

## 2019-07-11 DIAGNOSIS — M79.671 BILATERAL FOOT PAIN: ICD-10-CM

## 2019-07-11 NOTE — TELEPHONE ENCOUNTER
Confirmed patient's preferred pharmacy, allergies, and current medications as per Epic.    Forwarded to Dr. Delgado for review.

## 2019-07-12 RX ORDER — HYDROCODONE BITARTRATE AND ACETAMINOPHEN 5; 325 MG/1; MG/1
TABLET ORAL
Qty: 75 TABLET | Refills: 0 | Status: SHIPPED | OUTPATIENT
Start: 2019-07-12 | End: 2019-08-19 | Stop reason: SDUPTHER

## 2019-07-15 ENCOUNTER — PATIENT OUTREACH (OUTPATIENT)
Dept: OTHER | Facility: OTHER | Age: 84
End: 2019-07-15

## 2019-07-15 NOTE — PROGRESS NOTES
"Last 5 Patient Entered Readings                                      Current 30 Day Average: 142/70     Recent Readings 7/8/2019 7/7/2019 7/5/2019 7/4/2019 7/3/2019    SBP (mmHg) 146 169 154 108 114    DBP (mmHg) 72 82 77 53 60    Pulse 69 72 75 72 73        Digital Medicine: Health  Follow Up    Lifestyle Modifications:    1.Dietary Modifications (Sodium intake <2,000mg/day, food labels, dining out): Patient's daughter, Violetta, reports that there have been a couple times when she thought that her mom was dehydrated. We discussed that it can cause a hypotensive event, and Violetta discussed that her mom had been having a couple of readings that "were too low" recently. She discussed that she may try to "dress up" the water with various sliced fruit, in case her mom is "getting bored with water". She may incorporate Sprite or Gingerale, as well to ensure that she stays hydrated, although she discussed that it "usually goes ok".    2.Physical Activity: patient has COPD and is not able to exercise.     3.Medication Therapy: Patient has been compliant with the medication regimen.    4.Patient has the following medication side effects/concerns: Patient's daughter did not mention any problems or concerns with the medications.   (Frequency/Alleviating factors/Precipitating factors, etc.)     Violetta discussed that her mom is "anxious", and she "knows that can effect blood pressure". Her mom listens to music sometimes instead of the television and Violetta encourages her mom to "breathe through her nose", as she notices that she "pants sometimes", and when she does so is unable to "get as much oxygen". I encouraged Violetta to charge the cuff once a month and she admitted that she did get a "low battery alert" recently but, due to a recent power outage, was unable to charge the cuff. She reports that she "took a reading this morning", although digital medicine hasn't yet received the reading.    Follow up with Mrs. Anju FERRER Rivera " completed. No further questions or concerns. Will continue to follow up to achieve health goals.

## 2019-07-25 DIAGNOSIS — I10 ESSENTIAL HYPERTENSION: Primary | ICD-10-CM

## 2019-07-29 ENCOUNTER — PATIENT OUTREACH (OUTPATIENT)
Dept: OTHER | Facility: OTHER | Age: 84
End: 2019-07-29

## 2019-07-29 NOTE — PROGRESS NOTES
"Last 5 Patient Entered Readings                                      Current 30 Day Average: 137/70     Recent Readings 7/24/2019 7/23/2019 7/16/2019 7/15/2019 7/8/2019    SBP (mmHg) 125 136 131 141 146    DBP (mmHg) 71 86 57 70 72    Pulse 77 65 65 67 69        LVM to complete coacing follow up call. 8/13@11:38am -     Digital Medicine: Health  Follow Up    Lifestyle Modifications:    1.Dietary Modifications (Sodium intake <2,000mg/day, food labels, dining out): Ms. Mao, patient's wife discussed that it has been difficult to ensure that her mom stays hydrated, but that she is "working on different things" to help her mom to drink adequate liquids. She is considering trying apple juice, as her mom liked that at one time. Patient has coffee in the morning and a glass of wine with her meal in the evening.     2.Physical Activity: Patient walks "on her own" to the bathroom and back but can sometimes get "winded" doing so.    3.Medication Therapy: Patient has been compliant with the medication regimen.    4.Patient has the following medication side effects/concerns: Ms. Mao discussed that her mom has been sleeping more lately, which concerns her, but that she doesn't "think it has anything to do with her blood pressure medications". She will discuss those concerns with the patient's cardiologist at the appointment on 8/16/19.   (Frequency/Alleviating factors/Precipitating factors, etc.)     Ms. Mao discussed that patient will be in a respite center from 8/23-8/31, while she and her  go to [place] for a business trip/vacation. She requested that I place the patient on hiatus during that period.         Follow up with Mrs. Anju Lainezport completed. No further questions or concerns. Will continue to follow up to achieve health goals.      "

## 2019-08-01 ENCOUNTER — PATIENT MESSAGE (OUTPATIENT)
Dept: PULMONOLOGY | Facility: CLINIC | Age: 84
End: 2019-08-01

## 2019-08-01 DIAGNOSIS — F41.9 ANXIETY: Primary | ICD-10-CM

## 2019-08-01 NOTE — TELEPHONE ENCOUNTER
This message was in [employer] pool basket.     Confirmed patient's preferred pharmacy, allergies, and current medications as per Epic.    Forwarded to Dr. Delgado for review.

## 2019-08-02 RX ORDER — CLONAZEPAM 0.5 MG/1
0.5 TABLET ORAL 3 TIMES DAILY
Qty: 90 TABLET | Refills: 1 | OUTPATIENT
Start: 2019-08-02 | End: 2020-08-01

## 2019-08-02 RX ORDER — ALBUTEROL SULFATE 90 UG/1
2 AEROSOL, METERED RESPIRATORY (INHALATION) EVERY 4 HOURS PRN
Qty: 18 G | Refills: 3 | Status: SHIPPED | OUTPATIENT
Start: 2019-08-02 | End: 2019-08-03 | Stop reason: SDUPTHER

## 2019-08-02 NOTE — TELEPHONE ENCOUNTER
Per Dr. Delgado, he could not fulfill rx request at this time and requested on call physician be contacted until he could return to clinic.    Forwarded to Dr. JANNET Jurado MD, for review.

## 2019-08-03 DIAGNOSIS — J43.9 PULMONARY EMPHYSEMA, UNSPECIFIED EMPHYSEMA TYPE: Primary | ICD-10-CM

## 2019-08-03 RX ORDER — CLONAZEPAM 0.5 MG/1
0.5 TABLET ORAL 3 TIMES DAILY
Qty: 90 TABLET | Refills: 1 | Status: SHIPPED | OUTPATIENT
Start: 2019-08-03 | End: 2019-09-03 | Stop reason: SDUPTHER

## 2019-08-03 RX ORDER — CLONAZEPAM 0.5 MG/1
0.5 TABLET ORAL 3 TIMES DAILY
Qty: 90 TABLET | Refills: 1 | Status: CANCELLED | OUTPATIENT
Start: 2019-08-03 | End: 2020-08-02

## 2019-08-03 RX ORDER — ALBUTEROL SULFATE 90 UG/1
2 AEROSOL, METERED RESPIRATORY (INHALATION) EVERY 4 HOURS PRN
Qty: 18 G | Refills: 3 | Status: SHIPPED | OUTPATIENT
Start: 2019-08-03 | End: 2020-01-09 | Stop reason: SDUPTHER

## 2019-08-03 NOTE — TELEPHONE ENCOUNTER
Spoke with pt daughter informed her that I was calling from the on call dr office. Daughter informed that dr junior needed the name of pt pharmacy where she would like medication sent to. elvia stated that pt needed two medication refill due to pt provider is out of the office. Pt daughter evlia informed that message will be sent back to dr junior and to check with her local pharmacy in one hr. Pt daughter elvia stated understanding. Thanks.    albuterol (VENTOLIN HFA) 90 mcg/actuation inhaler    clonazePAM (KLONOPIN) 0.5 MG tablet      Pharmacy     [id number] - [place], [place] - [address] [facility] [address] [facility]     Pharmacy Contact     Telephone Fax   [phone]   Pharmacy Address and Hours     Address Hours   [address] [id number] None Available

## 2019-08-13 RX ORDER — CITALOPRAM 40 MG/1
TABLET, FILM COATED ORAL
Qty: 90 TABLET | Refills: 1 | Status: SHIPPED | OUTPATIENT
Start: 2019-08-13 | End: 2020-02-14

## 2019-08-15 DIAGNOSIS — Z02.2 ENCOUNTER FOR EXAMINATION FOR ADMISSION TO NURSING HOME: Primary | ICD-10-CM

## 2019-08-16 ENCOUNTER — CLINICAL SUPPORT (OUTPATIENT)
Dept: CARDIOLOGY | Facility: CLINIC | Age: 84
End: 2019-08-16
Payer: MEDICARE

## 2019-08-16 ENCOUNTER — OFFICE VISIT (OUTPATIENT)
Dept: CARDIOLOGY | Facility: CLINIC | Age: 84
End: 2019-08-16
Payer: MEDICARE

## 2019-08-16 VITALS
HEIGHT: 64 IN | WEIGHT: 83.75 LBS | SYSTOLIC BLOOD PRESSURE: 163 MMHG | BODY MASS INDEX: 14.3 KG/M2 | DIASTOLIC BLOOD PRESSURE: 89 MMHG | HEART RATE: 79 BPM

## 2019-08-16 DIAGNOSIS — R06.02 SHORTNESS OF BREATH: ICD-10-CM

## 2019-08-16 DIAGNOSIS — I35.9 AORTIC VALVE DISORDER: Chronic | ICD-10-CM

## 2019-08-16 DIAGNOSIS — E78.2 MIXED HYPERLIPIDEMIA: Chronic | ICD-10-CM

## 2019-08-16 DIAGNOSIS — I73.9 PERIPHERAL VASCULAR DISEASE: ICD-10-CM

## 2019-08-16 DIAGNOSIS — I49.3 PVC'S (PREMATURE VENTRICULAR CONTRACTIONS): ICD-10-CM

## 2019-08-16 DIAGNOSIS — I50.32 CHRONIC DIASTOLIC HEART FAILURE: Primary | Chronic | ICD-10-CM

## 2019-08-16 DIAGNOSIS — I35.0 NONRHEUMATIC AORTIC VALVE STENOSIS: ICD-10-CM

## 2019-08-16 DIAGNOSIS — I25.2 OLD MI (MYOCARDIAL INFARCTION): Chronic | ICD-10-CM

## 2019-08-16 DIAGNOSIS — I10 ESSENTIAL HYPERTENSION: ICD-10-CM

## 2019-08-16 DIAGNOSIS — N18.30 CHRONIC KIDNEY DISEASE, STAGE III (MODERATE): ICD-10-CM

## 2019-08-16 DIAGNOSIS — I25.118 CORONARY ARTERY DISEASE OF NATIVE ARTERY OF NATIVE HEART WITH STABLE ANGINA PECTORIS: Chronic | ICD-10-CM

## 2019-08-16 DIAGNOSIS — I10 ESSENTIAL HYPERTENSION: Chronic | ICD-10-CM

## 2019-08-16 DIAGNOSIS — I34.0 NON-RHEUMATIC MITRAL REGURGITATION: Chronic | ICD-10-CM

## 2019-08-16 DIAGNOSIS — R94.31 ABNORMAL ECG: ICD-10-CM

## 2019-08-16 DIAGNOSIS — I77.9 BILATERAL CAROTID ARTERY DISEASE, UNSPECIFIED TYPE: ICD-10-CM

## 2019-08-16 PROCEDURE — 99499 UNLISTED E&M SERVICE: CPT | Mod: HCNC,S$GLB,, | Performed by: INTERNAL MEDICINE

## 2019-08-16 PROCEDURE — 99214 OFFICE O/P EST MOD 30 MIN: CPT | Mod: HCNC,S$GLB,, | Performed by: INTERNAL MEDICINE

## 2019-08-16 PROCEDURE — 93010 EKG 12-LEAD: ICD-10-PCS | Mod: HCNC,S$GLB,, | Performed by: NUCLEAR MEDICINE

## 2019-08-16 PROCEDURE — 1101F PR PT FALLS ASSESS DOC 0-1 FALLS W/OUT INJ PAST YR: ICD-10-PCS | Mod: HCNC,CPTII,S$GLB, | Performed by: INTERNAL MEDICINE

## 2019-08-16 PROCEDURE — 93005 ELECTROCARDIOGRAM TRACING: CPT | Mod: HCNC,S$GLB,, | Performed by: INTERNAL MEDICINE

## 2019-08-16 PROCEDURE — 1101F PT FALLS ASSESS-DOCD LE1/YR: CPT | Mod: HCNC,CPTII,S$GLB, | Performed by: INTERNAL MEDICINE

## 2019-08-16 PROCEDURE — 93010 ELECTROCARDIOGRAM REPORT: CPT | Mod: HCNC,S$GLB,, | Performed by: NUCLEAR MEDICINE

## 2019-08-16 PROCEDURE — 99999 PR PBB SHADOW E&M-EST. PATIENT-LVL III: CPT | Mod: PBBFAC,HCNC,, | Performed by: INTERNAL MEDICINE

## 2019-08-16 PROCEDURE — 99214 PR OFFICE/OUTPT VISIT, EST, LEVL IV, 30-39 MIN: ICD-10-PCS | Mod: HCNC,S$GLB,, | Performed by: INTERNAL MEDICINE

## 2019-08-16 PROCEDURE — 99499 RISK ADDL DX/OHS AUDIT: ICD-10-PCS | Mod: HCNC,S$GLB,, | Performed by: INTERNAL MEDICINE

## 2019-08-16 PROCEDURE — 99999 PR PBB SHADOW E&M-EST. PATIENT-LVL III: ICD-10-PCS | Mod: PBBFAC,HCNC,, | Performed by: INTERNAL MEDICINE

## 2019-08-16 PROCEDURE — 93005 EKG 12-LEAD: ICD-10-PCS | Mod: HCNC,S$GLB,, | Performed by: INTERNAL MEDICINE

## 2019-08-16 NOTE — PROGRESS NOTES
Subjective:    Patient ID:  Anju Rivera is a 89 y.o. female who presents for evaluation of Essential hypertension; Hypertension; Hyperlipidemia; Coronary Artery Disease; Congestive Heart Failure; Peripheral Arterial Disease; Valvular Heart Disease; Shortness of Breath; and Risk Factor Management For Atherosclerosis      HPI  Pt returns for f/u.  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   Negative stress mpi 2/16.  Echo 2/16 showed normal LVEF, mild AS, mild PHTN.   Now here.  CAD seems stable.  She is not having any active anginal sxs on current medical tx.  She is weak and frail at her old age.  She has chronic CHRISTINE, unchanged.  Remains on home O2.  No falls.  She saw Dr. Jackson, [facility] surgery, last year for her PAD, with med mgt advised at her advanced age.  Echo 9/18 normal LV function, LVH, mild-mod AS.  BP is variable, elevated at times.  BNP test last year was stable.  Compliant with meds.  ecg today shows NSR, nonspecific abnormalities. No acute changes.   CRI and lipids stable.    Current Outpatient Medications:     albuterol (VENTOLIN HFA) 90 mcg/actuation inhaler, Inhale 2 puffs into the lungs every 4 (four) hours as needed for Wheezing., Disp: 18 g, Rfl: 3    albuterol-ipratropium (DUO-NEB) 2.5 mg-0.5 mg/3 mL nebulizer solution, USE 1 VIAL VIA NEBULIZER EVERY 6 TO 8 HOURS AS NEEDED FOR WHEEZING, Disp: 360 mL, Rfl: 6    allopurinol (ZYLOPRIM) 100 MG tablet, TAKE 2 TABLETS BY MOUTH ONCE DAILY, Disp: 180 tablet, Rfl: 1    aspirin (ECOTRIN) 81 MG EC tablet, Take 81 mg by mouth once daily., Disp: , Rfl:     bisacodyl (DULCOLAX) 5 mg EC tablet, Take 5 mg by mouth daily as needed for Constipation., Disp: , Rfl:     CARBOXYMETHYLCELLULOSE SODIUM (REFRESH OPHT),  "Apply 1 drop to eye daily as needed. , Disp: , Rfl:     citalopram (CELEXA) 40 MG tablet, TAKE 1 TABLET BY MOUTH DAILY, Disp: 90 tablet, Rfl: 1    clonazePAM (KLONOPIN) 0.5 MG tablet, Take 1 tablet (0.5 mg total) by mouth 3 (three) times daily., Disp: 90 tablet, Rfl: 1    HYDROcodone-acetaminophen (NORCO) 5-325 mg per tablet, Take 1/2 to 1 tablet one time during the the day as needed and 1 1/2 tablet at night, Disp: 75 tablet, Rfl: 0    latanoprost 0.005 % ophthalmic solution, Place 1 drop into both eyes every evening., Disp: 2.5 mL, Rfl: 12    levothyroxine (SYNTHROID) 50 MCG tablet, TAKE 1 TABLET BY MOUTH ONCE DAILY, Disp: 90 tablet, Rfl: 0    levothyroxine (SYNTHROID) 50 MCG tablet, TAKE 1 TABLET BY MOUTH ONCE DAILY, Disp: 90 tablet, Rfl: 0    levothyroxine (SYNTHROID) 50 MCG tablet, TAKE 1 TABLET BY MOUTH ONCE DAILY, Disp: 90 tablet, Rfl: 2    losartan (COZAAR) 50 MG tablet, TAKE 1 TABLET(50 MG) BY MOUTH EVERY DAY, Disp: 90 tablet, Rfl: 1    MULTIVITAMIN, Take 1 tablet by mouth Daily., Disp: , Rfl:     psyllium (METAMUCIL) powder, Take 1 packet by mouth once daily., Disp: , Rfl:     SYMBICORT 80-4.5 mcg/actuation HFAA, INHALE 2 PUFFS INTO THE LUNGS TWICE DAILY. RINSE MOUTH AFTER USE, Disp: 10.2 g, Rfl: 11      Review of Systems   Constitution: Positive for malaise/fatigue.   HENT: Negative.    Eyes: Negative.    Cardiovascular: Positive for dyspnea on exertion.   Respiratory: Positive for shortness of breath.    Endocrine: Negative.    Hematologic/Lymphatic: Negative.    Skin: Negative.    Musculoskeletal: Positive for arthritis, back pain and joint pain.   Gastrointestinal: Negative.    Genitourinary: Negative.    Neurological: Positive for weakness.   Psychiatric/Behavioral: Negative.    Allergic/Immunologic: Negative.        BP (!) 163/89 (BP Location: Left arm, Patient Position: Sitting)   Pulse 79   Ht 5' 4" (1.626 m)   Wt 38 kg (83 lb 12.4 oz)   BMI 14.38 kg/m²     Wt Readings from Last 3 " Encounters:   08/16/19 38 kg (83 lb 12.4 oz)   04/29/19 36.7 kg (80 lb 14.5 oz)   04/29/19 36.7 kg (80 lb 14.5 oz)     Temp Readings from Last 3 Encounters:   04/29/19 98.5 °F (36.9 °C) (Tympanic)   04/29/19 98.5 °F (36.9 °C) (Tympanic)   10/31/18 97.8 °F (36.6 °C) (Tympanic)     BP Readings from Last 3 Encounters:   08/16/19 (!) 163/89   04/29/19 (!) 160/80   04/29/19 (!) 162/70     Pulse Readings from Last 3 Encounters:   08/16/19 79   04/29/19 74   04/29/19 78          Objective:    Physical Exam   Constitutional: Vital signs are normal. She appears well-developed. She is cooperative. She does not have a sickly appearance. She does not appear ill. No distress.   HENT:   Head: Normocephalic.   Neck: Neck supple. Normal carotid pulses, no hepatojugular reflux and no JVD present. Carotid bruit is not present. No thyromegaly present.   Cardiovascular: Normal rate, regular rhythm, S1 normal, S2 normal, normal heart sounds and normal pulses. PMI is not displaced. Exam reveals no gallop and no friction rub.   No murmur heard.  Pulses:       Radial pulses are 2+ on the right side, and 2+ on the left side.   Pulmonary/Chest: Effort normal and breath sounds normal. She has no wheezes. She has no rales.   Abdominal: Soft. Normal appearance and bowel sounds are normal. She exhibits no pulsatile liver, no abdominal bruit and no mass. There is no tenderness.   Musculoskeletal: She exhibits no edema.   Lymphadenopathy:     She has no cervical adenopathy.   Neurological: She is alert.   Skin: Skin is warm. She is not diaphoretic.   Psychiatric: She has a normal mood and affect. Her behavior is normal.   Nursing note and vitals reviewed.      I have reviewed all pertinent labs and cardiac studies.    Component      Latest Ref Rng & Units 8/27/2018   BNP      0 - 99 pg/mL 106 (H)         Chemistry        Component Value Date/Time     04/29/2019 1553    K 4.8 04/29/2019 1553     04/29/2019 1553    CO2 25 04/29/2019 1553     BUN 41 (H) 04/29/2019 1553    CREATININE 1.3 04/29/2019 1553    GLU 86 04/29/2019 1553        Component Value Date/Time    CALCIUM 10.1 04/29/2019 1553    ALKPHOS 59 04/29/2019 1553    AST 29 04/29/2019 1553    ALT 14 04/29/2019 1553    BILITOT 0.4 04/29/2019 1553    ESTGFRAFRICA 42.0 (A) 04/29/2019 1553    EGFRNONAA 36.5 (A) 04/29/2019 1553        Lab Results   Component Value Date    WBC 5.77 10/19/2017    HGB 10.2 (L) 10/19/2017    HCT 32.9 (L) 10/19/2017    MCV 99 (H) 10/19/2017     10/19/2017     Lab Results   Component Value Date    HGBA1C 5.5 03/17/2008     Lab Results   Component Value Date    CHOL 190 04/29/2019    CHOL 189 04/17/2018    CHOL 179 08/01/2016     Lab Results   Component Value Date    HDL 84 (H) 04/29/2019    HDL 80 (H) 04/17/2018    HDL 61 08/01/2016     Lab Results   Component Value Date    LDLCALC 98.2 04/29/2019    LDLCALC 100.6 04/17/2018    LDLCALC 96.2 08/01/2016     Lab Results   Component Value Date    TRIG 39 04/29/2019    TRIG 42 04/17/2018    TRIG 109 08/01/2016     Lab Results   Component Value Date    CHOLHDL 44.2 04/29/2019    CHOLHDL 42.3 04/17/2018    CHOLHDL 34.1 08/01/2016           Assessment:       1. Chronic diastolic heart failure    2. Aortic valve disorder    3. Bilateral carotid artery disease, unspecified type    4. Chronic kidney disease, stage III (moderate)    5. Coronary artery disease of native artery of native heart with stable angina pectoris    6. HX of MI    7. Essential hypertension    8. Non-rheumatic mitral regurgitation    9. Mixed hyperlipidemia    10. Peripheral vascular disease    11. PVC's (premature ventricular contractions)    12. Shortness of breath    13. Nonrheumatic aortic valve stenosis    14. Abnormal ECG         Plan:             Stable chronic CV conditions on current medical tx.  Overall she has gotten frailer, more weak and with advanced lung disease requiring continuous home O2 for long time now.  Her diastolic CHF overall  seems compensated. No evidence of volume overload on exam today.  Reviewed tests, above medical conditions with pt and tx plan.  Continue medical tx for CV conditions.  F/u with vasc surgery prn for PAD.  Poor candidate for invasive CV procedures.  Conservative med mgt advised.  Cardiac low salt diet.  F/u 1 year, sooner if needed.

## 2019-08-19 DIAGNOSIS — M79.671 BILATERAL FOOT PAIN: ICD-10-CM

## 2019-08-19 DIAGNOSIS — G89.29 CHRONIC RIGHT SHOULDER PAIN: ICD-10-CM

## 2019-08-19 DIAGNOSIS — M79.672 BILATERAL FOOT PAIN: ICD-10-CM

## 2019-08-19 DIAGNOSIS — M25.511 CHRONIC RIGHT SHOULDER PAIN: ICD-10-CM

## 2019-08-19 RX ORDER — HYDROCODONE BITARTRATE AND ACETAMINOPHEN 5; 325 MG/1; MG/1
TABLET ORAL
Qty: 75 TABLET | Refills: 0 | Status: SHIPPED | OUTPATIENT
Start: 2019-08-19 | End: 2019-10-02 | Stop reason: SDUPTHER

## 2019-08-21 ENCOUNTER — TELEPHONE (OUTPATIENT)
Dept: INTERNAL MEDICINE | Facility: CLINIC | Age: 84
End: 2019-08-21

## 2019-09-03 DIAGNOSIS — J43.9 PULMONARY EMPHYSEMA, UNSPECIFIED EMPHYSEMA TYPE: ICD-10-CM

## 2019-09-03 NOTE — TELEPHONE ENCOUNTER
See [employer] message, pt's dtr wants to transfer rx's to [facility] pharmacy, updated in chart.    [place]  verified.    [place] 04/29/19  No future visit

## 2019-09-04 ENCOUNTER — PATIENT MESSAGE (OUTPATIENT)
Dept: INTERNAL MEDICINE | Facility: CLINIC | Age: 84
End: 2019-09-04

## 2019-09-04 RX ORDER — CLONAZEPAM 0.5 MG/1
0.5 TABLET ORAL 3 TIMES DAILY
Qty: 90 TABLET | Refills: 1 | Status: SHIPPED | OUTPATIENT
Start: 2019-09-04 | End: 2019-10-02 | Stop reason: SDUPTHER

## 2019-09-10 ENCOUNTER — PATIENT OUTREACH (OUTPATIENT)
Dept: OTHER | Facility: OTHER | Age: 84
End: 2019-09-10

## 2019-09-10 ENCOUNTER — PATIENT MESSAGE (OUTPATIENT)
Dept: INTERNAL MEDICINE | Facility: CLINIC | Age: 84
End: 2019-09-10

## 2019-09-10 NOTE — PROGRESS NOTES
"Digital Medicine: Health  Follow-Up      Spoke with patient's daughter, Ms. Mao, who reports that her mom's blood pressure is trending down. She discussed that her mom is staying hydrated and has been "doing a good job" doing that on her own. They are continuing to prepare a "low sodium diet" for their mom. She is concerned about getting a topical pain reliever gel for her refilled, as she believes that when the patient is in pain that it can "drive up her blood pressure". She and her  had a good vacation in [place] and she reports that her mom enjoyed her time at the [facility] center. She reports that the cuff is working better now that she has charged it.          Assessment/Plan    SDOH    Intervention/Plan    There are no preventive care reminders to display for this patient.    Last 5 Patient Entered Readings                                      Current 30 Day Average: 144/61     Recent Readings 9/4/2019 8/18/2019 8/9/2019 8/7/2019 8/2/2019    SBP (mmHg) 149 139 153 125 134    DBP (mmHg) 52 69 75 55 69    Pulse 70 77 64 70 70                "

## 2019-09-13 ENCOUNTER — TELEPHONE (OUTPATIENT)
Dept: INTERNAL MEDICINE | Facility: CLINIC | Age: 84
End: 2019-09-13

## 2019-09-13 NOTE — TELEPHONE ENCOUNTER
----- Message from Talia Tang sent at 9/13/2019 10:18 AM CDT -----  Contact: pt daughter   Type:  RX Refill Request    Who Called: elvia   Refill or New Rx: refill   RX Name and Strength: baclofen lidocaine  cream   How is the patient currently taking it? (ex. 1XDay): as needed  Is this a 30 day or 90 day RX: 90 day   Preferred Pharmacy with phone number:     [place] [facility]       Local or Mail Order: local   Ordering Provider:  Danny   Would the patient rather a call back or a response via [facility]? Call   Best Call Back Number: [phone] (home)   Additional Information:

## 2019-09-13 NOTE — TELEPHONE ENCOUNTER
I returned pts daughter call in regards to medication, I informed her that paperwork had been faxed off to [facility] twice. I informed her I will attempt to give them a call to follow up. //BJ

## 2019-09-23 ENCOUNTER — PATIENT OUTREACH (OUTPATIENT)
Dept: OTHER | Facility: OTHER | Age: 84
End: 2019-09-23

## 2019-09-23 NOTE — PROGRESS NOTES
High alert received for patient's BP over the weekend. Repeated and towards patient's normal. Remains above her goal of <140/90 mmHg.     LVM on daughter's cell. Consider increasing losartan dose.

## 2019-09-25 ENCOUNTER — PATIENT MESSAGE (OUTPATIENT)
Dept: INTERNAL MEDICINE | Facility: CLINIC | Age: 84
End: 2019-09-25

## 2019-09-30 ENCOUNTER — OFFICE VISIT (OUTPATIENT)
Dept: OPHTHALMOLOGY | Facility: CLINIC | Age: 84
End: 2019-09-30
Payer: MEDICARE

## 2019-09-30 DIAGNOSIS — H04.129 DRY EYE: ICD-10-CM

## 2019-09-30 DIAGNOSIS — Z96.1 PSEUDOPHAKIA OF BOTH EYES: ICD-10-CM

## 2019-09-30 DIAGNOSIS — H40.1133 PRIMARY OPEN ANGLE GLAUCOMA OF BOTH EYES, SEVERE STAGE: Primary | ICD-10-CM

## 2019-09-30 PROCEDURE — 92012 INTRM OPH EXAM EST PATIENT: CPT | Mod: HCNC,S$GLB,, | Performed by: OPHTHALMOLOGY

## 2019-09-30 PROCEDURE — 99999 PR PBB SHADOW E&M-EST. PATIENT-LVL II: CPT | Mod: PBBFAC,HCNC,, | Performed by: OPHTHALMOLOGY

## 2019-09-30 PROCEDURE — 92133 CPTRZD OPH DX IMG PST SGM ON: CPT | Mod: HCNC,S$GLB,, | Performed by: OPHTHALMOLOGY

## 2019-09-30 PROCEDURE — 92133 POSTERIOR SEGMENT OCT OPTIC NERVE(OCULAR COHERENCE TOMOGRAPHY) - OU - BOTH EYES: ICD-10-PCS | Mod: HCNC,S$GLB,, | Performed by: OPHTHALMOLOGY

## 2019-09-30 PROCEDURE — 99999 PR PBB SHADOW E&M-EST. PATIENT-LVL II: ICD-10-PCS | Mod: PBBFAC,HCNC,, | Performed by: OPHTHALMOLOGY

## 2019-09-30 PROCEDURE — 92012 PR EYE EXAM, EST PATIENT,INTERMED: ICD-10-PCS | Mod: HCNC,S$GLB,, | Performed by: OPHTHALMOLOGY

## 2019-09-30 NOTE — PROGRESS NOTES
SUBJECTIVE:   Anju Rivera is a 89 y.o. female   Corrected distance visual acuity was 20/50 in the right eye and 20/30 -1 in the left eye. Comments: Last MRX in the phoropter, patient forgot her glasses today.   Chief Complaint   Patient presents with    Glaucoma        HPI:  HPI     3 months glaucoma check (IOP check and review GOCT)    Patient states that when she types in 11 it looks like 111. Patient also   states that it takes her eyes a little while to adjust when she comes   inside from the bright lights.    1. PCIOL OU  2. Severe Coag with enlarged cups (init 38/18) Goal < 16  Vf progression OD 11/17 add Brimonidine  Brimonidine-D/C due to drowsiness,redness,irritation   Rhopressa QHS OU (not using due to redness)  3. PVD OD  4. H/O narrow angles  5. Blepharitis  6 YUNG OU      Latanoprost qhs OU  Systane PRN OU    Last edited by Shilpa Barnett on 9/30/2019  1:40 PM. (History)        Assessment /Plan :  1. Primary open angle glaucoma of both eyes, severe stage Doing well, IOP within acceptable range relative to target IOP and no evidence of progression. Continue current treatment. Reviewed importance of continued compliance with treatment and follow up.     2. Pseudophakia of both eyes  -- Condition stable, no therapeutic change required. Monitoring routinely.     3. Dry eye  -- Condition stable, no therapeutic change required. Monitoring routinely.       Return to clinic in 3-4 months  or as needed.  With Dilation, HVF 24-2 and SDP

## 2019-10-02 ENCOUNTER — PATIENT MESSAGE (OUTPATIENT)
Dept: INTERNAL MEDICINE | Facility: CLINIC | Age: 84
End: 2019-10-02

## 2019-10-02 DIAGNOSIS — M25.511 CHRONIC RIGHT SHOULDER PAIN: ICD-10-CM

## 2019-10-02 DIAGNOSIS — M79.672 BILATERAL FOOT PAIN: ICD-10-CM

## 2019-10-02 DIAGNOSIS — G89.29 CHRONIC RIGHT SHOULDER PAIN: ICD-10-CM

## 2019-10-02 DIAGNOSIS — M79.671 BILATERAL FOOT PAIN: ICD-10-CM

## 2019-10-02 DIAGNOSIS — J43.9 PULMONARY EMPHYSEMA, UNSPECIFIED EMPHYSEMA TYPE: ICD-10-CM

## 2019-10-03 ENCOUNTER — TELEPHONE (OUTPATIENT)
Dept: INTERNAL MEDICINE | Facility: CLINIC | Age: 84
End: 2019-10-03

## 2019-10-03 RX ORDER — CLONAZEPAM 0.5 MG/1
0.5 TABLET ORAL 3 TIMES DAILY
Qty: 90 TABLET | Refills: 1 | Status: SHIPPED | OUTPATIENT
Start: 2019-10-03 | End: 2019-11-01 | Stop reason: SDUPTHER

## 2019-10-03 RX ORDER — HYDROCODONE BITARTRATE AND ACETAMINOPHEN 5; 325 MG/1; MG/1
TABLET ORAL
Qty: 75 TABLET | Refills: 0 | Status: SHIPPED | OUTPATIENT
Start: 2019-10-03 | End: 2019-11-07 | Stop reason: SDUPTHER

## 2019-10-03 NOTE — TELEPHONE ENCOUNTER
----- Message from Jackie Jesus sent at 10/3/2019  8:03 AM CDT -----  [phone]  Would like to consult with nurse to get approval to fill two different medications because it's saying your not suppose to take both of those medications together. Please call back at [phone].  Thanks,  KJ

## 2019-10-03 NOTE — TELEPHONE ENCOUNTER
I s/w [place] at [facility] in regards to medication. She states she was pretty sure Dr. Delgado was aware of the medication pt was taking, she just needed to verify. Ms. Barahona thanked me for the call and ended call. //BJ

## 2019-10-08 ENCOUNTER — PATIENT OUTREACH (OUTPATIENT)
Dept: OTHER | Facility: OTHER | Age: 84
End: 2019-10-08

## 2019-10-21 ENCOUNTER — PATIENT MESSAGE (OUTPATIENT)
Dept: INTERNAL MEDICINE | Facility: CLINIC | Age: 84
End: 2019-10-21

## 2019-10-23 ENCOUNTER — TELEPHONE (OUTPATIENT)
Dept: INTERNAL MEDICINE | Facility: CLINIC | Age: 84
End: 2019-10-23

## 2019-10-23 DIAGNOSIS — S90.821A BLISTER (NONTHERMAL), RIGHT FOOT, INITIAL ENCOUNTER: Primary | ICD-10-CM

## 2019-10-23 PROCEDURE — G0180 MD CERTIFICATION HHA PATIENT: HCPCS | Mod: ,,, | Performed by: FAMILY MEDICINE

## 2019-10-23 PROCEDURE — G0180 PR HOME HEALTH MD CERTIFICATION: ICD-10-PCS | Mod: ,,, | Performed by: FAMILY MEDICINE

## 2019-10-23 NOTE — TELEPHONE ENCOUNTER
----- Message from Mona Patel sent at 10/23/2019  9:53 AM CDT -----  Contact: Patient's Daughter- Violetta Mao  Daughter is calling to check the status of the [facility], requesting home health for her mother's wound care. She would like [facility] to service her mother  [phone] (Violetta- patient's daughter)          [facility] # [phone]   Fax# [phone]

## 2019-10-23 NOTE — TELEPHONE ENCOUNTER
I s/w pts daughter in regards to referral for home health being placed. Pt's daughter stated they have already spoken with [facility]. She stated that someone would be coming out to there home. Pt thanked me for calling and ended call. //BJ

## 2019-10-30 ENCOUNTER — EXTERNAL HOME HEALTH (OUTPATIENT)
Dept: HOME HEALTH SERVICES | Facility: HOSPITAL | Age: 84
End: 2019-10-30
Payer: MEDICARE

## 2019-11-01 DIAGNOSIS — J43.9 PULMONARY EMPHYSEMA, UNSPECIFIED EMPHYSEMA TYPE: ICD-10-CM

## 2019-11-01 RX ORDER — CLONAZEPAM 0.5 MG/1
0.5 TABLET ORAL 3 TIMES DAILY
Qty: 90 TABLET | Refills: 1 | Status: SHIPPED | OUTPATIENT
Start: 2019-11-01 | End: 2019-12-02 | Stop reason: SDUPTHER

## 2019-11-07 DIAGNOSIS — G89.29 CHRONIC RIGHT SHOULDER PAIN: ICD-10-CM

## 2019-11-07 DIAGNOSIS — M79.672 BILATERAL FOOT PAIN: ICD-10-CM

## 2019-11-07 DIAGNOSIS — M25.511 CHRONIC RIGHT SHOULDER PAIN: ICD-10-CM

## 2019-11-07 DIAGNOSIS — M79.671 BILATERAL FOOT PAIN: ICD-10-CM

## 2019-11-07 RX ORDER — HYDROCODONE BITARTRATE AND ACETAMINOPHEN 5; 325 MG/1; MG/1
TABLET ORAL
Qty: 75 TABLET | Refills: 0 | Status: SHIPPED | OUTPATIENT
Start: 2019-11-07 | End: 2019-12-17 | Stop reason: SDUPTHER

## 2019-11-12 RX ORDER — LEVOTHYROXINE SODIUM 50 UG/1
TABLET ORAL
Qty: 90 TABLET | Refills: 0 | Status: SHIPPED | OUTPATIENT
Start: 2019-11-12 | End: 2020-03-02 | Stop reason: SDUPTHER

## 2019-11-19 NOTE — PROGRESS NOTES
Digital Medicine: Health  Follow-Up    Patient's daughter reports that patient had a fall on October 8 and other medical complications lately.    Her daughter has been having her own health issues that she has been dealing with as well as caring for her mom. She apologizes for not taking any recent readings and discussed that she will take more.    She discussed that her mom does not often have her feet flat on the floor when they take her blood pressure, and that lately they have been advising her to keep her feet propped up, due to recent problems that she has been having with her feet. She explained that the chair that her mom sits on rocks. We discussed best practices in monitoring technique and she admitted that it is difficult to ensure all instructions are followed as her mom has a complex medical situation and care needs. We reviewed that not following the best practice instructions for monitoring may effect the accuracy of the readings for her mom's blood pressure.    She discussed that she will try to start charging the cuff monthly for 6-8 hours.           INTERVENTION(S)  encouragement/support    PLAN  patient verbalizes understanding and additional monitoring needed      There are no preventive care reminders to display for this patient.    Last 5 Patient Entered Readings                                      Current 30 Day Average: 157/72     Recent Readings 11/3/2019 10/30/2019 10/22/2019 10/14/2019 10/12/2019    SBP (mmHg) 134 161 175 122 161    DBP (mmHg) 65 71 81 59 73    Pulse 61 68 67 65 66                      Diet Screening       Patient's daughter discussed that her mom has not much of an appetite and tends to prefer processed foods and frozen dinners over home-cooked food. It is important to her that her mom is able to continue to eat, although she often is not able to maintain a diet that is low in sodium or high in fresh fruits and vegetables. We reviewed that, despite her mom's  reliance on processed foods, that her diastolic value does often stay below 80. She continues to encourage her mom to stay hydrated.       SDOH

## 2019-12-02 DIAGNOSIS — J43.9 PULMONARY EMPHYSEMA, UNSPECIFIED EMPHYSEMA TYPE: ICD-10-CM

## 2019-12-03 RX ORDER — CLONAZEPAM 0.5 MG/1
0.5 TABLET ORAL 3 TIMES DAILY
Qty: 90 TABLET | Refills: 1 | Status: SHIPPED | OUTPATIENT
Start: 2019-12-03 | End: 2020-02-03 | Stop reason: SDUPTHER

## 2019-12-09 ENCOUNTER — PATIENT MESSAGE (OUTPATIENT)
Dept: INTERNAL MEDICINE | Facility: CLINIC | Age: 84
End: 2019-12-09

## 2019-12-09 DIAGNOSIS — J44.9 CHRONIC OBSTRUCTIVE PULMONARY DISEASE, UNSPECIFIED COPD TYPE: ICD-10-CM

## 2019-12-10 RX ORDER — IPRATROPIUM BROMIDE AND ALBUTEROL SULFATE 2.5; .5 MG/3ML; MG/3ML
SOLUTION RESPIRATORY (INHALATION)
Qty: 360 ML | Refills: 6 | Status: SHIPPED | OUTPATIENT
Start: 2019-12-10

## 2019-12-12 RX ORDER — ALLOPURINOL 100 MG/1
TABLET ORAL
Qty: 180 TABLET | Refills: 1 | Status: SHIPPED | OUTPATIENT
Start: 2019-12-12 | End: 2020-06-05

## 2019-12-17 ENCOUNTER — OFFICE VISIT (OUTPATIENT)
Dept: INTERNAL MEDICINE | Facility: CLINIC | Age: 84
End: 2019-12-17
Payer: MEDICARE

## 2019-12-17 VITALS
TEMPERATURE: 99 F | WEIGHT: 82 LBS | BODY MASS INDEX: 14 KG/M2 | HEART RATE: 85 BPM | SYSTOLIC BLOOD PRESSURE: 114 MMHG | DIASTOLIC BLOOD PRESSURE: 64 MMHG | OXYGEN SATURATION: 94 % | HEIGHT: 64 IN

## 2019-12-17 DIAGNOSIS — I10 ESSENTIAL HYPERTENSION: Chronic | ICD-10-CM

## 2019-12-17 DIAGNOSIS — M79.671 BILATERAL FOOT PAIN: ICD-10-CM

## 2019-12-17 DIAGNOSIS — M79.672 BILATERAL FOOT PAIN: ICD-10-CM

## 2019-12-17 DIAGNOSIS — G89.29 CHRONIC RIGHT SHOULDER PAIN: ICD-10-CM

## 2019-12-17 DIAGNOSIS — M25.511 CHRONIC RIGHT SHOULDER PAIN: ICD-10-CM

## 2019-12-17 DIAGNOSIS — J43.9 PULMONARY EMPHYSEMA, UNSPECIFIED EMPHYSEMA TYPE: Primary | Chronic | ICD-10-CM

## 2019-12-17 PROCEDURE — 99999 PR PBB SHADOW E&M-EST. PATIENT-LVL III: CPT | Mod: PBBFAC,HCNC,, | Performed by: FAMILY MEDICINE

## 2019-12-17 PROCEDURE — 99214 OFFICE O/P EST MOD 30 MIN: CPT | Mod: HCNC,S$GLB,, | Performed by: FAMILY MEDICINE

## 2019-12-17 PROCEDURE — 1159F MED LIST DOCD IN RCRD: CPT | Mod: HCNC,S$GLB,, | Performed by: FAMILY MEDICINE

## 2019-12-17 PROCEDURE — 99999 PR PBB SHADOW E&M-EST. PATIENT-LVL III: ICD-10-PCS | Mod: PBBFAC,HCNC,, | Performed by: FAMILY MEDICINE

## 2019-12-17 PROCEDURE — 1101F PR PT FALLS ASSESS DOC 0-1 FALLS W/OUT INJ PAST YR: ICD-10-PCS | Mod: HCNC,CPTII,S$GLB, | Performed by: FAMILY MEDICINE

## 2019-12-17 PROCEDURE — 99214 PR OFFICE/OUTPT VISIT, EST, LEVL IV, 30-39 MIN: ICD-10-PCS | Mod: HCNC,S$GLB,, | Performed by: FAMILY MEDICINE

## 2019-12-17 PROCEDURE — 1159F PR MEDICATION LIST DOCUMENTED IN MEDICAL RECORD: ICD-10-PCS | Mod: HCNC,S$GLB,, | Performed by: FAMILY MEDICINE

## 2019-12-17 PROCEDURE — 1126F PR PAIN SEVERITY QUANTIFIED, NO PAIN PRESENT: ICD-10-PCS | Mod: HCNC,S$GLB,, | Performed by: FAMILY MEDICINE

## 2019-12-17 PROCEDURE — 1126F AMNT PAIN NOTED NONE PRSNT: CPT | Mod: HCNC,S$GLB,, | Performed by: FAMILY MEDICINE

## 2019-12-17 PROCEDURE — 1101F PT FALLS ASSESS-DOCD LE1/YR: CPT | Mod: HCNC,CPTII,S$GLB, | Performed by: FAMILY MEDICINE

## 2019-12-17 RX ORDER — BUDESONIDE AND FORMOTEROL FUMARATE DIHYDRATE 160; 4.5 UG/1; UG/1
2 AEROSOL RESPIRATORY (INHALATION) EVERY 12 HOURS
Qty: 1 INHALER | Refills: 1 | Status: SHIPPED | OUTPATIENT
Start: 2019-12-17 | End: 2020-02-25 | Stop reason: CLARIF

## 2019-12-17 RX ORDER — HYDROCODONE BITARTRATE AND ACETAMINOPHEN 5; 325 MG/1; MG/1
TABLET ORAL
Qty: 75 TABLET | Refills: 0 | Status: CANCELLED | OUTPATIENT
Start: 2019-12-17

## 2019-12-17 RX ORDER — HYDROCODONE BITARTRATE AND ACETAMINOPHEN 5; 325 MG/1; MG/1
TABLET ORAL
Qty: 75 TABLET | Refills: 0 | Status: SHIPPED | OUTPATIENT
Start: 2019-12-17 | End: 2020-01-30 | Stop reason: SDUPTHER

## 2019-12-17 NOTE — PROGRESS NOTES
Subjective:       Patient ID: Anju Rivera is a 89 y.o. female.    Chief Complaint: Hospital Follow Up    Pt is a 89 year old has some upper resp exacerbation likely 2nd to sinus drainage. She went to hospital xray chest was fine. No fever. Pt has existing COPD and does take symbicort 80/4.5. Improved symptoms today but still coughing.    Review of Systems   Constitutional: Negative.    HENT: Positive for postnasal drip. Negative for ear pain and sore throat.    Respiratory: Positive for shortness of breath. Negative for chest tightness.    Cardiovascular: Negative for chest pain and leg swelling.   Gastrointestinal: Negative for abdominal pain and vomiting.   Genitourinary: Negative.    Musculoskeletal: Negative.  Negative for neck pain.   Skin: Negative for rash.   Neurological: Negative.  Negative for headaches.   Hematological: Negative.        Objective:      Physical Exam   Constitutional: She appears well-developed and well-nourished.   Cardiovascular: Normal rate and regular rhythm. Exam reveals no friction rub.   No murmur heard.  Pulmonary/Chest: Effort normal and breath sounds normal. No stridor. She has no wheezes.   Skin: Skin is warm and dry.   Psychiatric: She has a normal mood and affect. Her behavior is normal.       Assessment:       1. Pulmonary emphysema, unspecified emphysema type    2. Essential hypertension    3. Chronic right shoulder pain    4. Bilateral foot pain        Plan:       Pulmonary emphysema, unspecified emphysema type  Comments:  Will increase the symbocort 160/4.5    Essential hypertension  Comments:  Pt BP is well controlled    Chronic right shoulder pain  -     HYDROcodone-acetaminophen (NORCO) 5-325 mg per tablet; Take 1/2 to 1 tablet one time during the the day as needed and 1 1/2 tablet at night  Dispense: 75 tablet; Refill: 0    Bilateral foot pain  -     HYDROcodone-acetaminophen (NORCO) 5-325 mg per tablet; Take 1/2 to 1 tablet one time during the the day as needed  and 1 1/2 tablet at night  Dispense: 75 tablet; Refill: 0    Other orders  -     budesonide-formoterol 160-4.5 mcg (SYMBICORT) 160-4.5 mcg/actuation HFAA; Inhale 2 puffs into the lungs every 12 (twelve) hours. Controller  Dispense: 1 Inhaler; Refill: 1

## 2019-12-17 NOTE — TELEPHONE ENCOUNTER
I returned call to pharmacy in regards to medication. Ms. Barahona the pharmacist stated that Norco was on back order but she would order it, and it would be in on Friday. She also stated that Norco and Klonopin has drug interaction. I informed her that pt had been taking medication together for a while. Ms. Barahona thanked me and ended call. //BJ

## 2019-12-17 NOTE — TELEPHONE ENCOUNTER
----- Message from Radha Casey sent at 12/17/2019 11:26 AM CST -----  Contact: [facility] - Ms Barahona  Stated the pt medication (HYDROcodone-acetaminophen (NORCO) 5-325 mg per tablet) is out of stock and also wanted to let you the pt takes (clonazePAM (KLONOPIN) 0.5 MG tablet) that has a reaction with Norco, she can be reached at [phone] Thanks

## 2019-12-24 ENCOUNTER — PATIENT OUTREACH (OUTPATIENT)
Dept: OTHER | Facility: OTHER | Age: 84
End: 2019-12-24

## 2019-12-31 DIAGNOSIS — J43.9 PULMONARY EMPHYSEMA, UNSPECIFIED EMPHYSEMA TYPE: ICD-10-CM

## 2019-12-31 RX ORDER — CLONAZEPAM 0.5 MG/1
0.5 TABLET ORAL 3 TIMES DAILY
Qty: 90 TABLET | Refills: 1 | Status: CANCELLED | OUTPATIENT
Start: 2019-12-31 | End: 2020-12-30

## 2020-01-02 ENCOUNTER — PATIENT MESSAGE (OUTPATIENT)
Dept: INTERNAL MEDICINE | Facility: CLINIC | Age: 85
End: 2020-01-02

## 2020-01-02 ENCOUNTER — TELEPHONE (OUTPATIENT)
Dept: OPHTHALMOLOGY | Facility: CLINIC | Age: 85
End: 2020-01-02

## 2020-01-06 ENCOUNTER — OFFICE VISIT (OUTPATIENT)
Dept: OPHTHALMOLOGY | Facility: CLINIC | Age: 85
End: 2020-01-06
Payer: MEDICARE

## 2020-01-06 DIAGNOSIS — Z96.1 PSEUDOPHAKIA OF BOTH EYES: ICD-10-CM

## 2020-01-06 DIAGNOSIS — H40.1133 PRIMARY OPEN ANGLE GLAUCOMA OF BOTH EYES, SEVERE STAGE: Primary | ICD-10-CM

## 2020-01-06 PROCEDURE — 99999 PR PBB SHADOW E&M-EST. PATIENT-LVL II: ICD-10-PCS | Mod: PBBFAC,HCNC,, | Performed by: OPHTHALMOLOGY

## 2020-01-06 PROCEDURE — 92083 HUMPHREY VISUAL FIELD - OU - BOTH EYES: ICD-10-PCS | Mod: HCNC,S$GLB,, | Performed by: OPHTHALMOLOGY

## 2020-01-06 PROCEDURE — 92014 COMPRE OPH EXAM EST PT 1/>: CPT | Mod: HCNC,S$GLB,, | Performed by: OPHTHALMOLOGY

## 2020-01-06 PROCEDURE — 92083 EXTENDED VISUAL FIELD XM: CPT | Mod: HCNC,S$GLB,, | Performed by: OPHTHALMOLOGY

## 2020-01-06 PROCEDURE — 92250 FUNDUS PHOTOGRAPHY W/I&R: CPT | Mod: HCNC,S$GLB,, | Performed by: OPHTHALMOLOGY

## 2020-01-06 PROCEDURE — 92014 PR EYE EXAM, EST PATIENT,COMPREHESV: ICD-10-PCS | Mod: HCNC,S$GLB,, | Performed by: OPHTHALMOLOGY

## 2020-01-06 PROCEDURE — 99999 PR PBB SHADOW E&M-EST. PATIENT-LVL II: CPT | Mod: PBBFAC,HCNC,, | Performed by: OPHTHALMOLOGY

## 2020-01-06 PROCEDURE — 92250 COLOR FUNDUS PHOTOGRAPHY - OU - BOTH EYES: ICD-10-PCS | Mod: HCNC,S$GLB,, | Performed by: OPHTHALMOLOGY

## 2020-01-06 NOTE — PROGRESS NOTES
SUBJECTIVE:   Anju Rivera is a 89 y.o. female   Corrected distance visual acuity was 20/50 in the right eye and 20/25 in the left eye.   Chief Complaint   Patient presents with    Glaucoma     3-4 mth HVF, dilation, sdp. pt doing fine with current specs.         HPI:  HPI     Glaucoma      Additional comments: 3-4 mth HVF, dilation, sdp. pt doing fine with   current specs.               Comments     1. PCIOL OU  2. Severe Coag with enlarged cups (init 38/18) Goal < 16  Vf progression OD 11/17 add Brimonidine  Brimonidine-D/C due to drowsiness,redness,irritation   Rhopressa QHS OU (not using due to redness)  3. PVD OD  4. H/O narrow angles  5. Blepharitis  6 YUNG OU      Latanoprost qhs OU  Systane PRN OU          Last edited by Summer Allen MA on 1/6/2020  9:28 AM. (History)        Assessment /Plan :  1. Primary open angle glaucoma of both eyes, severe stage Doing well, IOP within acceptable range relative to target IOP and no evidence of progression. Continue current treatment. Reviewed importance of continued compliance with treatment and follow up.      Splinter Hem OD will change Latanoprost to Vyzulta qhs ou    2. Pseudophakia of both eyes  -- Condition stable, no therapeutic change required. Monitoring routinely.           Return to clinic in 3 months  or as needed.  With IOP Check and GOCT

## 2020-01-08 RX ORDER — LOSARTAN POTASSIUM 50 MG/1
TABLET ORAL
Qty: 90 TABLET | Refills: 1 | Status: SHIPPED | OUTPATIENT
Start: 2020-01-08 | End: 2020-03-07 | Stop reason: SDUPTHER

## 2020-01-09 DIAGNOSIS — J43.9 PULMONARY EMPHYSEMA, UNSPECIFIED EMPHYSEMA TYPE: ICD-10-CM

## 2020-01-10 RX ORDER — ALBUTEROL SULFATE 90 UG/1
2 AEROSOL, METERED RESPIRATORY (INHALATION) EVERY 4 HOURS PRN
Qty: 18 G | Refills: 3 | Status: SHIPPED | OUTPATIENT
Start: 2020-01-10 | End: 2021-01-09

## 2020-01-21 ENCOUNTER — PATIENT OUTREACH (OUTPATIENT)
Dept: OTHER | Facility: OTHER | Age: 85
End: 2020-01-21

## 2020-01-22 ENCOUNTER — TELEPHONE (OUTPATIENT)
Dept: PHARMACY | Facility: CLINIC | Age: 85
End: 2020-01-22

## 2020-01-22 NOTE — TELEPHONE ENCOUNTER
Good Afternoon,     The prior authorization for Anju Rivera's Vyzulta prescription has been APPROVED FROM 1/22/2020 TO 12/31/2020 with copayment of $99.00.       Patient's daughter has been notified of the decision on 1/22/2020 and patient's daughter states she she will speak to her Mom when she wakes up.  She is not sure if her Mom will want to pay the copayment.    If there are any additional questions or concerns, please contact me.    Thank You!   Kori Bell CPhT, B.A  Patient Care Advocate   [facility]  Phone: [phone] Ext [phone]  Fax: [phone]

## 2020-01-27 ENCOUNTER — OFFICE VISIT (OUTPATIENT)
Dept: PULMONOLOGY | Facility: CLINIC | Age: 85
End: 2020-01-27
Payer: MEDICARE

## 2020-01-27 ENCOUNTER — HOSPITAL ENCOUNTER (OUTPATIENT)
Dept: RADIOLOGY | Facility: HOSPITAL | Age: 85
Discharge: HOME | End: 2020-01-27
Attending: NURSE PRACTITIONER
Payer: MEDICARE

## 2020-01-27 ENCOUNTER — OFFICE VISIT (OUTPATIENT)
Dept: INTERNAL MEDICINE | Facility: CLINIC | Age: 85
End: 2020-01-27
Payer: MEDICARE

## 2020-01-27 VITALS
BODY MASS INDEX: 13.06 KG/M2 | DIASTOLIC BLOOD PRESSURE: 76 MMHG | WEIGHT: 76.5 LBS | HEART RATE: 85 BPM | OXYGEN SATURATION: 92 % | RESPIRATION RATE: 15 BRPM | SYSTOLIC BLOOD PRESSURE: 120 MMHG | HEIGHT: 64 IN

## 2020-01-27 VITALS
WEIGHT: 82 LBS | SYSTOLIC BLOOD PRESSURE: 130 MMHG | DIASTOLIC BLOOD PRESSURE: 70 MMHG | OXYGEN SATURATION: 95 % | HEART RATE: 79 BPM | BODY MASS INDEX: 14 KG/M2 | HEIGHT: 64 IN | TEMPERATURE: 99 F

## 2020-01-27 DIAGNOSIS — I27.20 PULMONARY HYPERTENSION: Chronic | ICD-10-CM

## 2020-01-27 DIAGNOSIS — I49.3 PVC'S (PREMATURE VENTRICULAR CONTRACTIONS): ICD-10-CM

## 2020-01-27 DIAGNOSIS — M25.511 CHRONIC RIGHT SHOULDER PAIN: ICD-10-CM

## 2020-01-27 DIAGNOSIS — J43.9 PULMONARY EMPHYSEMA, UNSPECIFIED EMPHYSEMA TYPE: Primary | Chronic | ICD-10-CM

## 2020-01-27 DIAGNOSIS — I73.9 PERIPHERAL VASCULAR DISEASE: ICD-10-CM

## 2020-01-27 DIAGNOSIS — Z85.828 HISTORY OF SKIN CANCER: ICD-10-CM

## 2020-01-27 DIAGNOSIS — I77.1 TORTUOUS AORTA: ICD-10-CM

## 2020-01-27 DIAGNOSIS — E78.2 MIXED HYPERLIPIDEMIA: Chronic | ICD-10-CM

## 2020-01-27 DIAGNOSIS — I35.0 NONRHEUMATIC AORTIC VALVE STENOSIS: ICD-10-CM

## 2020-01-27 DIAGNOSIS — I35.9 AORTIC VALVE DISORDER: Chronic | ICD-10-CM

## 2020-01-27 DIAGNOSIS — S98.112A AMPUTATION OF LEFT GREAT TOE: ICD-10-CM

## 2020-01-27 DIAGNOSIS — Z85.3 HISTORY OF BREAST CANCER: ICD-10-CM

## 2020-01-27 DIAGNOSIS — I25.118 CORONARY ARTERY DISEASE OF NATIVE ARTERY OF NATIVE HEART WITH STABLE ANGINA PECTORIS: Chronic | ICD-10-CM

## 2020-01-27 DIAGNOSIS — I10 ESSENTIAL HYPERTENSION: Chronic | ICD-10-CM

## 2020-01-27 DIAGNOSIS — S90.821A BLISTER (NONTHERMAL), RIGHT FOOT, INITIAL ENCOUNTER: ICD-10-CM

## 2020-01-27 DIAGNOSIS — R80.9 PROTEINURIA, UNSPECIFIED TYPE: ICD-10-CM

## 2020-01-27 DIAGNOSIS — I65.23 BILATERAL CAROTID ARTERY STENOSIS: ICD-10-CM

## 2020-01-27 DIAGNOSIS — R94.31 ABNORMAL ECG: ICD-10-CM

## 2020-01-27 DIAGNOSIS — I25.2 OLD MI (MYOCARDIAL INFARCTION): Chronic | ICD-10-CM

## 2020-01-27 DIAGNOSIS — J43.9 PULMONARY EMPHYSEMA, UNSPECIFIED EMPHYSEMA TYPE: Chronic | ICD-10-CM

## 2020-01-27 DIAGNOSIS — H40.1130 CHRONIC OPEN ANGLE GLAUCOMA OF BOTH EYES, UNSPECIFIED GLAUCOMA STAGE: ICD-10-CM

## 2020-01-27 DIAGNOSIS — M79.672 BILATERAL FOOT PAIN: ICD-10-CM

## 2020-01-27 DIAGNOSIS — R05.9 COUGH: ICD-10-CM

## 2020-01-27 DIAGNOSIS — M19.011 PRIMARY OSTEOARTHRITIS OF RIGHT SHOULDER: ICD-10-CM

## 2020-01-27 DIAGNOSIS — G89.29 CHRONIC RIGHT SHOULDER PAIN: ICD-10-CM

## 2020-01-27 DIAGNOSIS — I50.32 CHRONIC DIASTOLIC HEART FAILURE: Chronic | ICD-10-CM

## 2020-01-27 DIAGNOSIS — R06.02 SHORTNESS OF BREATH: ICD-10-CM

## 2020-01-27 DIAGNOSIS — R52 PAIN: ICD-10-CM

## 2020-01-27 DIAGNOSIS — Z00.00 ENCOUNTER FOR PREVENTIVE HEALTH EXAMINATION: Primary | ICD-10-CM

## 2020-01-27 DIAGNOSIS — F32.5 MAJOR DEPRESSIVE DISORDER WITH SINGLE EPISODE, IN REMISSION: ICD-10-CM

## 2020-01-27 DIAGNOSIS — J44.9 CHRONIC OBSTRUCTIVE PULMONARY DISEASE, UNSPECIFIED COPD TYPE: ICD-10-CM

## 2020-01-27 DIAGNOSIS — Z96.1 PSEUDOPHAKIA OF BOTH EYES: ICD-10-CM

## 2020-01-27 DIAGNOSIS — R63.6 UNDERWEIGHT: ICD-10-CM

## 2020-01-27 DIAGNOSIS — M79.671 BILATERAL FOOT PAIN: ICD-10-CM

## 2020-01-27 DIAGNOSIS — I34.0 NONRHEUMATIC MITRAL VALVE REGURGITATION: Chronic | ICD-10-CM

## 2020-01-27 DIAGNOSIS — R06.02 SOB (SHORTNESS OF BREATH): ICD-10-CM

## 2020-01-27 DIAGNOSIS — N18.30 CHRONIC KIDNEY DISEASE, STAGE III (MODERATE): ICD-10-CM

## 2020-01-27 DIAGNOSIS — M81.0 AGE-RELATED OSTEOPOROSIS WITHOUT CURRENT PATHOLOGICAL FRACTURE: ICD-10-CM

## 2020-01-27 DIAGNOSIS — H40.1133 PRIMARY OPEN ANGLE GLAUCOMA OF BOTH EYES, SEVERE STAGE: ICD-10-CM

## 2020-01-27 DIAGNOSIS — E03.9 ACQUIRED HYPOTHYROIDISM: ICD-10-CM

## 2020-01-27 DIAGNOSIS — F41.9 ANXIETY: ICD-10-CM

## 2020-01-27 DIAGNOSIS — H52.7 REFRACTIVE ERROR: ICD-10-CM

## 2020-01-27 PROCEDURE — 1101F PR PT FALLS ASSESS DOC 0-1 FALLS W/OUT INJ PAST YR: ICD-10-PCS | Mod: HCNC,CPTII,S$GLB, | Performed by: NURSE PRACTITIONER

## 2020-01-27 PROCEDURE — 99499 RISK ADDL DX/OHS AUDIT: ICD-10-PCS | Mod: HCNC,S$GLB,, | Performed by: PHYSICIAN ASSISTANT

## 2020-01-27 PROCEDURE — 99999 PR PBB SHADOW E&M-EST. PATIENT-LVL V: ICD-10-PCS | Mod: PBBFAC,HCNC,, | Performed by: PHYSICIAN ASSISTANT

## 2020-01-27 PROCEDURE — 1159F MED LIST DOCD IN RCRD: CPT | Mod: HCNC,S$GLB,, | Performed by: NURSE PRACTITIONER

## 2020-01-27 PROCEDURE — 99999 PR PBB SHADOW E&M-EST. PATIENT-LVL V: CPT | Mod: PBBFAC,HCNC,, | Performed by: PHYSICIAN ASSISTANT

## 2020-01-27 PROCEDURE — 71046 X-RAY EXAM CHEST 2 VIEWS: CPT | Mod: 26,HCNC,, | Performed by: RADIOLOGY

## 2020-01-27 PROCEDURE — 99999 PR PBB SHADOW E&M-EST. PATIENT-LVL V: ICD-10-PCS | Mod: PBBFAC,HCNC,, | Performed by: NURSE PRACTITIONER

## 2020-01-27 PROCEDURE — 99214 OFFICE O/P EST MOD 30 MIN: CPT | Mod: HCNC,S$GLB,, | Performed by: NURSE PRACTITIONER

## 2020-01-27 PROCEDURE — 99499 UNLISTED E&M SERVICE: CPT | Mod: HCNC,S$GLB,, | Performed by: PHYSICIAN ASSISTANT

## 2020-01-27 PROCEDURE — 99214 PR OFFICE/OUTPT VISIT, EST, LEVL IV, 30-39 MIN: ICD-10-PCS | Mod: HCNC,S$GLB,, | Performed by: NURSE PRACTITIONER

## 2020-01-27 PROCEDURE — 1159F PR MEDICATION LIST DOCUMENTED IN MEDICAL RECORD: ICD-10-PCS | Mod: HCNC,S$GLB,, | Performed by: NURSE PRACTITIONER

## 2020-01-27 PROCEDURE — 71046 X-RAY EXAM CHEST 2 VIEWS: CPT | Mod: TC,HCNC

## 2020-01-27 PROCEDURE — G0439 PPPS, SUBSEQ VISIT: HCPCS | Mod: HCNC,S$GLB,, | Performed by: PHYSICIAN ASSISTANT

## 2020-01-27 PROCEDURE — 99999 PR PBB SHADOW E&M-EST. PATIENT-LVL V: CPT | Mod: PBBFAC,HCNC,, | Performed by: NURSE PRACTITIONER

## 2020-01-27 PROCEDURE — G0439 PR MEDICARE ANNUAL WELLNESS SUBSEQUENT VISIT: ICD-10-PCS | Mod: HCNC,S$GLB,, | Performed by: PHYSICIAN ASSISTANT

## 2020-01-27 PROCEDURE — 71046 XR CHEST PA AND LATERAL: ICD-10-PCS | Mod: 26,HCNC,, | Performed by: RADIOLOGY

## 2020-01-27 PROCEDURE — 1101F PT FALLS ASSESS-DOCD LE1/YR: CPT | Mod: HCNC,CPTII,S$GLB, | Performed by: NURSE PRACTITIONER

## 2020-01-27 RX ORDER — DOXYCYCLINE 100 MG/1
100 CAPSULE ORAL 2 TIMES DAILY
Qty: 20 CAPSULE | Refills: 0 | Status: SHIPPED | OUTPATIENT
Start: 2020-01-27 | End: 2020-09-18

## 2020-01-27 RX ORDER — METHYLPREDNISOLONE 4 MG/1
TABLET ORAL
Qty: 1 PACKAGE | Refills: 0 | Status: SHIPPED | OUTPATIENT
Start: 2020-01-27 | End: 2020-07-27

## 2020-01-27 NOTE — PROGRESS NOTES
"Anju Rivera presented for a  Medicare AWV and comprehensive Health Risk Assessment today. The following components were reviewed and updated:    · Medical history  · Family History  · Social history  · Allergies and Current Medications  · Health Risk Assessment  · Health Maintenance  · Care Team     ** See Completed Assessments for Annual Wellness Visit within the encounter summary.**       The following assessments were completed:  · Living Situation  · CAGE  · Depression Screening  · Timed Get Up and Go  · Whisper Test  · Cognitive Function Screening  · Nutrition Screening  · ADL Screening  · PAQ Screening    Vitals:    01/27/20 1420   BP: 130/70   BP Location: Left arm   Patient Position: Sitting   BP Method: Medium (Manual)   Pulse: 79   Temp: 99.1 °F (37.3 °C)   TempSrc: Tympanic   SpO2: 95%   Weight: 37.2 kg (82 lb 0.2 oz)   Height: 5' 4" (1.626 m)     Body mass index is 14.08 kg/m².  Physical Exam   Constitutional: She is oriented to person, place, and time. She appears well-developed and well-nourished. No distress.   HENT:   Head: Normocephalic and atraumatic.   Cardiovascular: Normal rate, regular rhythm, normal heart sounds and intact distal pulses. Exam reveals no gallop and no friction rub.   No murmur heard.  Pulmonary/Chest: Effort normal and breath sounds normal. No respiratory distress. She has no wheezes. She has no rales.   Musculoskeletal: Normal range of motion.   Neurological: She is alert and oriented to person, place, and time.   Skin: Skin is warm. Capillary refill takes less than 2 seconds. No rash noted. She is not diaphoretic.   Psychiatric: She has a normal mood and affect. Her behavior is normal. Judgment and thought content normal.   Nursing note and vitals reviewed.        Diagnoses and health risks identified today and associated recommendations/orders:    1. Encounter for preventive health examination  -completed today.  Health Maintenance Due   Topic Date Due    TETANUS " VACCINE  07/21/1947    Sign Pain Contract  07/21/1947    Urine Drug Screen  07/21/1947    Naloxone Prescription  07/21/1947    Shingles Vaccine (2 of 3) 09/21/2017    DEXA SCAN  08/15/2019   -patient needs follow up with her PCP to address some of her healthcare maintenance. Will schedule.     2. Underweight  -weight has been stable for the past year.   -Stable and controlled. Continue current treatment plan as previously prescribed with your PCP.     3. Tortuous aorta  -CXR 10/4/2016  -stable on asa daily  -Stable and controlled. Continue current treatment plan as previously prescribed with your PCP.     4. PVC's (premature ventricular contractions)  -Stable and controlled. Continue current treatment plan as previously prescribed with your cardiologist.   -holter 2/24/2016    5. Pulmonary hypertension  -Stable and controlled. Continue current treatment plan as previously prescribed with your PCP.     6. Peripheral vascular disease  -Stable and controlled. Continue current treatment plan as previously prescribed with your vascular surgeon.   -KESHA 7/18/2018: the rt kesha is suggestive of mild  disease the left kesha is suggestive of moderate disease  there is diffuise calcification with triphasic waveforms in the cfa sfa and popliteals bilaterally.there is 50-99% stenosis of the rt sfa ostium and left mid sfa. the rt pta is occluded the left pta has a hemodynamically significant stenosis 50-99% range   the left peroneal is occluded    7. Nonrheumatic aortic valve stenosis  -2D echo 9/11/2018: CONCLUSIONS     1 - Concentric hypertrophy.     2 - No wall motion abnormalities.     3 - Normal left ventricular systolic function (EF 55-60%).     4 - Normal left ventricular diastolic function.     5 - Normal right ventricular systolic function .     6 - The estimated PA systolic pressure is 30 mmHg.     7 - Mild to moderate aortic stenosis, DEEDEE = 1.29 cm2, AVAi = 0.97 cm2/m2, peak velocity = 2.7 m/s, mean gradient = 19  mmHg.   -Stable and controlled. Continue current treatment plan as previously prescribed with your cardiologist, DR. Trejo    8. Nonrheumatic mitral valve regurgitation  -Stable and controlled. Continue current treatment plan as previously prescribed with your cardiologist, Dr. Trejo  -2D echo 1/26/2015:   CONCLUSIONS     1 - Normal left ventricular systolic function (EF 55-60%).     2 - Normal left ventricular diastolic function.     3 - Normal right ventricular systolic function .     4 - Mild to moderate mitral regurgitation.     5 - Mild tricuspid regurgitation.     9. Mixed hyperlipidemia  -Stable and controlled. Continue current treatment plan as previously prescribed with your PCP  Lab Results   Component Value Date    CHOL 190 04/29/2019    CHOL 189 04/17/2018    CHOL 179 08/01/2016     Lab Results   Component Value Date    HDL 84 (H) 04/29/2019    HDL 80 (H) 04/17/2018    HDL 61 08/01/2016     Lab Results   Component Value Date    LDLCALC 98.2 04/29/2019    LDLCALC 100.6 04/17/2018    LDLCALC 96.2 08/01/2016     Lab Results   Component Value Date    TRIG 39 04/29/2019    TRIG 42 04/17/2018    TRIG 109 08/01/2016     Lab Results   Component Value Date    CHOLHDL 44.2 04/29/2019    CHOLHDL 42.3 04/17/2018    CHOLHDL 34.1 08/01/2016       10. Essential hypertension  -Stable and controlled. Continue current treatment plan as previously prescribed with your cardiologist.    11. Chronic diastolic heart failure  -2D echo 2/5/2013: CONCLUSIONS     1 - Normal left ventricular function (EF 55%).     2 - Diastolic dysfunction.     3 - Concentric remodeling.     12. Bilateral carotid artery stenosis  -Carotid ultrasound 1/22/2012  -Stable and controlled. Continue current treatment plan as previously prescribed with your cardiologist and vascular surgeon.    13. Aortic valve disorder  -2D echo 9/11/2018: The aortic valve is moderately sclerotic. The peak velocity obtained across the aortic valve is 2.7 m/s, which  translates to a peak gradient of 29 mmHg. The mean gradient is 19 mmHg. Using a left ventricular outflow tract diameter of 2.0   cm, a left ventricular outflow tract velocity time integral of 25 cm, and a peak instantaneous transvalvular velocity time integral of 61 cm, the calculated aortic valve area is 1.29 cm2(AVAi is 0.97 cm2/m2), consistent with mild to moderate aortic   stenosis.   -Stable and controlled. Continue current treatment plan as previously prescribed with your cardiologist.    14. 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 Continue current treatment plan as previously prescribed with your Cardiologist, DR. Trejo    15. Coronary artery disease of native artery of native heart with stable angina pectoris  -Stable and controlled. Continue current treatment plan as previously prescribed with your cardiologist.  myocardial infarction in the 80's - right brachial or radial complication requiring amputation of her 4th - 5th digits on her right hand    16. Abnormal ECG  -Stable and controlled. Continue current treatment plan as previously prescribed with your cardiologist.    17. Pulmonary emphysema, unspecified emphysema type  -Stable and controlled on oxygen, symbicort, and duoneb. Continue current treatment plan as previously prescribed with your pulmonologist  -PFT 12/6/2016    18. Shortness of breath  -Stable on oxygen and inhalers. Continue current treatment plan as previously prescribed with your pulmonologist.    19. Major depressive disorder with single episode, in remission  -Stable and controlled on celexa. Continue current treatment plan as previously prescribed with your PCP.     20. Anxiety  -Stable and controlled on klonopin. Continue current treatment plan as previously prescribed with your PCP.     21. Chronic kidney disease, stage III (moderate)  Lab Results    Component Value Date    CREATININE 1.3 04/29/2019    BUN 41 (H) 04/29/2019     04/29/2019    K 4.8 04/29/2019     04/29/2019    CO2 25 04/29/2019     -Stable and controlled. Continue current treatment plan as previously prescribed with your PCP.     22. Acquired hypothyroidism  Lab Results   Component Value Date    TSH 1.456 04/29/2019     -Stable and controlled. Continue current treatment plan as previously prescribed with your PCP.     23. Proteinuria, unspecified type  -Stable and controlled. Continue current treatment plan as previously prescribed with your PCP.     24. Primary osteoarthritis of right shoulder  -Stable and controlled. Continue current treatment plan as previously prescribed with your PCP.     25. Pain  -Stable and controlled. Continue current treatment plan as previously prescribed with your PCP.     26. Age-related osteoporosis without current pathological fracture  -DEXA 8/15/2017  -Stable and controlled. Continue current treatment plan as previously prescribed with your PCP.     27. History of skin cancer  -recommend yearly skin checks with dermatologist  -Stable and controlled. Continue current treatment plan as previously prescribed with your PCP and dermatologist.    28. History of breast cancer  -1996. S/P right mastectomy  -Stable and controlled. Continue current treatment plan as previously prescribed with your PCP.    29. Chronic right shoulder pain  -Stable and controlled. Continue current treatment plan as previously prescribed with your PCP.     30. Amputation of left great toe  -Stable and controlled. Continue current treatment plan as previously prescribed with your PCP.     31. Bilateral foot pain  -Stable and controlled. Continue current treatment plan as previously prescribed with your PCP.    32. Blister (nonthermal), right foot, initial encounter  -resolved  -Stable and controlled. Continue current treatment plan as previously prescribed with your PCP.     33.  Chronic open angle glaucoma of both eyes, unspecified glaucoma stage  -Stable and controlled. Continue current treatment plan as previously prescribed with your ophthalmologist, DR. Verduzco.     34. Refractive error  -Stable and controlled. Continue current treatment plan as previously prescribed with your ophthalmologist, DR. Verduzco.     35. Pseudophakia of both eyes  -Stable and controlled. Continue current treatment plan as previously prescribed with your ophthalmologist, DR. Verduzco.     36. Primary open angle glaucoma of both eyes, severe stage  -Stable and controlled. Continue current treatment plan as previously prescribed with your ophthalmologist, DR. Verduzco.     Provided Anju with a 5-10 year written screening schedule and personal prevention plan. Recommendations were developed using the USPSTF age appropriate recommendations. Education, counseling, and referrals were provided as needed. After Visit Summary printed and given to patient which includes a list of additional screenings\tests needed.    Follow up if symptoms worsen or fail to improve.    Talisha Tracy PA-C  I offered to discuss end of life issues, including information on how to make advance directives that the patient could use to name someone who would make medical decisions on their behalf if they became too ill to make themselves.    ___Patient declined  _X_Patient is interested, I provided paper work and offered to discuss.

## 2020-01-27 NOTE — PATIENT INSTRUCTIONS
Counseling and Referral of Other Preventative  (Italic type indicates deductible and co-insurance are waived)    Patient Name: Anju Rivera  Today's Date: 1/27/2020    Health Maintenance       Date Due Completion Date    TETANUS VACCINE 07/21/1947 ---    Sign Pain Contract 07/21/1947 ---    Urine Drug Screen 07/21/1947 ---    Naloxone Prescription 07/21/1947 ---    Shingles Vaccine (2 of 3) 09/21/2017 7/27/2017    DEXA SCAN 08/15/2019 8/15/2017    Lipid Panel 04/29/2020 4/29/2019    Aspirin/Antiplatelet Therapy 01/06/2021 1/6/2020        No orders of the defined types were placed in this encounter.    The following information is provided to all patients.  This information is to help you find resources for any of the problems found today that may be affecting your health:                Living healthy guide: www.[facility].[place].gov      Understanding Diabetes: www.diabetes.org      Eating healthy: www.cdc.gov/healthyweight      [facility] home safety checklist: www.cdc.gov/steadi/patient.html      Agency on Aging: www.goea.[place].[facility]      Alcoholics anonymous (AA): www.aa.org      Physical Activity: www.darien.nih.gov/hi4neyt      Tobacco use: www.quitwithusla.org

## 2020-01-27 NOTE — PROGRESS NOTES
"Ms. Mao checked in via voicemessage and reports that Ms. Rivera has had an upper respiratory infection and that she is beginning to feel better. She reports that she has been taking "breathing treatments" - and notes that she hasn't been taking as frequent readings as she realizes that both factors can increase her blood pressure. She explained that she will resume taking Ms. Rivera's blood pressure regularly.   "

## 2020-01-28 NOTE — PROGRESS NOTES
"Subjective:      Patient ID: Anju Rivera is a 89 y.o. female.    Chief Complaint: COPD    HPI  Patient with her preop med for evaluation her cough and shortness of Breath, postnasal drip. She is compliant with Symbicort assistance from family.  She use with spacer.  She has difficulty ambulating and severe muscle weakness with arthritis.  He is able to tolerate her neb treatments twice a day.     Patient Active Problem List   Diagnosis    Coronary artery disease    Chronic diastolic heart failure    Mitral regurgitation    COPD (chronic obstructive pulmonary disease)    Hypertension    Aortic valve disorder    Pulmonary hypertension    Mixed hyperlipidemia    COAG (chronic open-angle glaucoma) - Both Eyes    HX of MI    Hypothyroidism    Peripheral vascular disease    Amputation of left great toe    Anxiety    Shortness of breath    PVC's (premature ventricular contractions)    Proteinuria    Chronic kidney disease, stage III (moderate)    History of breast cancer    History of skin cancer    Major depression    Carotid artery disease    Tortuous aorta    Pain    Primary open angle glaucoma of both eyes, severe stage    Pseudophakia of both eyes    Refractive error    Primary osteoarthritis of right shoulder    Osteoporosis    Bilateral foot pain    Chronic right shoulder pain    Nonrheumatic aortic valve stenosis    Abnormal ECG       /76   Pulse 85   Resp 15   Ht 5' 4" (1.626 m)   Wt 34.7 kg (76 lb 8 oz)   SpO2 (!) 92% Comment: 2 LPM  BMI 13.13 kg/m²   Body mass index is 13.13 kg/m².    Review of Systems   Constitutional: Positive for fatigue.   HENT: Positive for postnasal drip.    Respiratory: Positive for cough and shortness of breath.    Musculoskeletal: Positive for gait problem.     Objective:      Physical Exam   Constitutional: She appears cachectic.   Neck: Normal range of motion. Neck supple.   Cardiovascular: Normal rate and regular rhythm. "   Pulmonary/Chest: Effort normal.   Mild end expiratory wheezing   Abdominal: Soft.   Musculoskeletal: She exhibits no edema.   Neurological: She is alert.     Personal Diagnostic Review      Results for orders placed during the hospital encounter of 01/27/20   X-Ray Chest PA And Lateral    Narrative EXAMINATION:  XR CHEST PA AND LATERAL    CLINICAL HISTORY:  Chronic obstructive pulmonary disease, unspecified    TECHNIQUE:  PA and lateral views of the chest were performed.    COMPARISON:  01/28/2019 chest radiographs    FINDINGS:  Bilateral similar appearing chronic changes in the lungs with biapical areas of pleuroparenchymal scarring.  Coarsening of the bronchovascular markings.  Hyperexpanded appearance of the lungs.  No new or confluent airspace opacity.  Cardiomediastinal silhouette is not enlarged.  Aortic atherosclerosis is noted.  Multilevel degenerative changes of the spine with osteopenia.  Chronic appearing deformity of the right proximal humerus.  Hiatal hernia.  Surgical clips in the right axilla.  Detail limited by rotation.      Impression Unchanged appearance of the chest.  No active disease      Electronically signed by: Jose L Power MD  Date:    01/27/2020  Time:    18:42         Assessment:       1. Pulmonary emphysema, unspecified emphysema type    2. SOB (shortness of breath)    3. Cough        Outpatient Encounter Medications as of 1/27/2020   Medication Sig Dispense Refill    albuterol (VENTOLIN HFA) 90 mcg/actuation inhaler Inhale 2 puffs into the lungs every 4 (four) hours as needed for Wheezing. 18 g 3    albuterol-ipratropium (DUO-NEB) 2.5 mg-0.5 mg/3 mL nebulizer solution USE 1 VIAL VIA NEBULIZER EVERY 6 TO 8 HOURS AS NEEDED FOR WHEEZING 360 mL 6    allopurinol (ZYLOPRIM) 100 MG tablet TAKE 2 TABLETS BY MOUTH ONCE DAILY 180 tablet 1    aspirin (ECOTRIN) 81 MG EC tablet Take 81 mg by mouth once daily.      bisacodyl (DULCOLAX) 5 mg EC tablet Take 5 mg by mouth daily as needed for  Constipation.      budesonide-formoterol 160-4.5 mcg (SYMBICORT) 160-4.5 mcg/actuation HFAA Inhale 2 puffs into the lungs every 12 (twelve) hours. Controller 1 Inhaler 1    CARBOXYMETHYLCELLULOSE SODIUM (REFRESH OPHT) Apply 1 drop to eye daily as needed.       citalopram (CELEXA) 40 MG tablet TAKE 1 TABLET BY MOUTH DAILY 90 tablet 1    clonazePAM (KLONOPIN) 0.5 MG tablet Take 1 tablet (0.5 mg total) by mouth 3 (three) times daily. 90 tablet 1    HYDROcodone-acetaminophen (NORCO) 5-325 mg per tablet Take 1/2 to 1 tablet one time during the the day as needed and 1 1/2 tablet at night 75 tablet 0    latanoprost 0.005 % ophthalmic solution Place 1 drop into both eyes every evening. 2.5 mL 12    latanoprostene bunod (VYZULTA) 0.024 % Drop Apply 1 drop to eye every evening. 2.5 mL 12    levothyroxine (SYNTHROID) 50 MCG tablet TAKE 1 TABLET BY MOUTH ONCE DAILY 90 tablet 0    losartan (COZAAR) 50 MG tablet TAKE ONE TABLET BY MOUTH ONCE A DAY 90 tablet 1    OXYGEN-AIR DELIVERY SYSTEMS MISC 2 L by Misc.(Non-Drug; Combo Route) route. Per min      psyllium (METAMUCIL) powder Take 1 packet by mouth once daily.      SYMBICORT 80-4.5 mcg/actuation HFAA INHALE 2 PUFFS INTO THE LUNGS TWICE DAILY. RINSE MOUTH AFTER USE 10.2 g 11    UNKNOWN TO PATIENT Cough syrup as needed      doxycycline (MONODOX) 100 MG capsule Take 1 capsule (100 mg total) by mouth 2 (two) times daily. 20 capsule 0    levothyroxine (SYNTHROID) 50 MCG tablet TAKE 1 TABLET BY MOUTH ONCE DAILY (Patient not taking: Reported on 1/27/2020) 90 tablet 0    levothyroxine (SYNTHROID) 50 MCG tablet TAKE 1 TABLET BY MOUTH ONCE DAILY (Patient not taking: Reported on 1/27/2020) 90 tablet 0    levothyroxine (SYNTHROID) 50 MCG tablet TAKE 1 TABLET BY MOUTH ONCE DAILY (Patient not taking: Reported on 1/27/2020) 90 tablet 2    methylPREDNISolone (MEDROL DOSEPACK) 4 mg tablet use as directed 1 Package 0    MULTIVITAMIN Take 1 tablet by mouth Daily.       No  facility-administered encounter medications on file as of 1/27/2020.      No orders of the defined types were placed in this encounter.    Plan:        Problem List Items Addressed This Visit        Pulmonary    COPD (chronic obstructive pulmonary disease) - Primary (Chronic)    Relevant Medications    doxycycline (MONODOX) 100 MG capsule    methylPREDNISolone (MEDROL DOSEPACK) 4 mg tablet      Other Visit Diagnoses     SOB (shortness of breath)        Cough           Call if symptoms not improved.  UDS symptoms worsen.

## 2020-01-30 DIAGNOSIS — G89.29 CHRONIC RIGHT SHOULDER PAIN: ICD-10-CM

## 2020-01-30 DIAGNOSIS — M79.672 BILATERAL FOOT PAIN: ICD-10-CM

## 2020-01-30 DIAGNOSIS — M79.671 BILATERAL FOOT PAIN: ICD-10-CM

## 2020-01-30 DIAGNOSIS — M25.511 CHRONIC RIGHT SHOULDER PAIN: ICD-10-CM

## 2020-01-31 RX ORDER — HYDROCODONE BITARTRATE AND ACETAMINOPHEN 5; 325 MG/1; MG/1
TABLET ORAL
Qty: 75 TABLET | Refills: 0 | Status: SHIPPED | OUTPATIENT
Start: 2020-01-31 | End: 2020-03-02 | Stop reason: SDUPTHER

## 2020-02-03 DIAGNOSIS — J43.9 PULMONARY EMPHYSEMA, UNSPECIFIED EMPHYSEMA TYPE: ICD-10-CM

## 2020-02-04 RX ORDER — CLONAZEPAM 0.5 MG/1
0.5 TABLET ORAL 3 TIMES DAILY
Qty: 90 TABLET | Refills: 1 | Status: SHIPPED | OUTPATIENT
Start: 2020-02-04 | End: 2020-03-02 | Stop reason: SDUPTHER

## 2020-02-14 RX ORDER — CITALOPRAM 40 MG/1
TABLET, FILM COATED ORAL
Qty: 90 TABLET | Refills: 2 | Status: SHIPPED | OUTPATIENT
Start: 2020-02-14 | End: 2020-11-12 | Stop reason: SDUPTHER

## 2020-02-23 ENCOUNTER — PATIENT MESSAGE (OUTPATIENT)
Dept: PULMONOLOGY | Facility: CLINIC | Age: 85
End: 2020-02-23

## 2020-02-24 DIAGNOSIS — J44.9 CHRONIC OBSTRUCTIVE PULMONARY DISEASE, UNSPECIFIED COPD TYPE: Primary | ICD-10-CM

## 2020-02-25 RX ORDER — FLUTICASONE FUROATE AND VILANTEROL 100; 25 UG/1; UG/1
1 POWDER RESPIRATORY (INHALATION) DAILY
Qty: 1 EACH | Refills: 11 | Status: SHIPPED | OUTPATIENT
Start: 2020-02-25 | End: 2021-03-05

## 2020-03-02 ENCOUNTER — PATIENT MESSAGE (OUTPATIENT)
Dept: INTERNAL MEDICINE | Facility: CLINIC | Age: 85
End: 2020-03-02

## 2020-03-02 DIAGNOSIS — M79.672 BILATERAL FOOT PAIN: ICD-10-CM

## 2020-03-02 DIAGNOSIS — G89.29 CHRONIC RIGHT SHOULDER PAIN: ICD-10-CM

## 2020-03-02 DIAGNOSIS — J43.9 PULMONARY EMPHYSEMA, UNSPECIFIED EMPHYSEMA TYPE: ICD-10-CM

## 2020-03-02 DIAGNOSIS — M25.511 CHRONIC RIGHT SHOULDER PAIN: ICD-10-CM

## 2020-03-02 DIAGNOSIS — M79.671 BILATERAL FOOT PAIN: ICD-10-CM

## 2020-03-02 RX ORDER — CLONAZEPAM 0.5 MG/1
0.5 TABLET ORAL 3 TIMES DAILY
Qty: 90 TABLET | Refills: 1 | Status: SHIPPED | OUTPATIENT
Start: 2020-03-02 | End: 2020-03-31 | Stop reason: SDUPTHER

## 2020-03-02 RX ORDER — HYDROCODONE BITARTRATE AND ACETAMINOPHEN 5; 325 MG/1; MG/1
TABLET ORAL
Qty: 75 TABLET | Refills: 0 | Status: SHIPPED | OUTPATIENT
Start: 2020-03-02 | End: 2020-04-13 | Stop reason: SDUPTHER

## 2020-03-02 RX ORDER — LEVOTHYROXINE SODIUM 50 UG/1
50 TABLET ORAL DAILY
Qty: 90 TABLET | Refills: 0 | Status: SHIPPED | OUTPATIENT
Start: 2020-03-02 | End: 2020-08-25

## 2020-03-09 RX ORDER — LOSARTAN POTASSIUM 50 MG/1
TABLET ORAL
Qty: 90 TABLET | Refills: 1 | Status: SHIPPED | OUTPATIENT
Start: 2020-03-09 | End: 2020-08-31

## 2020-03-19 NOTE — PROGRESS NOTES
Digital Medicine: Health  Follow-Up    Patient's daughter reports that her mom had no symptoms associated with her recent higher readings.    We discussed that her mom's in-person office visit readings were lower than her home readings. We reviewed technique and daughter explained that they usually encourage her mom to keep her feet flat on the floor, though she isn't always able to do so. She may start to label the note field for the reading if Ms. Rivera's feet are elevated.     I suggested that they compare the digital cuff with an in-office cuff reading the next time that they are able.     She has been charging the cuff monthly.              INTERVENTION(S)  reviewed monitoring technique and encouragement/support      There are no preventive care reminders to display for this patient.    Last 5 Patient Entered Readings                                      Current 30 Day Average: 155/80     Recent Readings 3/18/2020 3/16/2020 3/16/2020 3/10/2020 3/7/2020    SBP (mmHg) 154 183 173 141 157    DBP (mmHg) 80 94 94 68 76    Pulse 72 65 65 69 63                      Diet Screening       Patient's daughter reports that she is trying to encourage her mom to eat less sodium. She explained that her mom likes to eat food that is higher in sodium and she believes it may be why she has gotten higher readings lately.      SDOH

## 2020-03-31 DIAGNOSIS — J43.9 PULMONARY EMPHYSEMA, UNSPECIFIED EMPHYSEMA TYPE: ICD-10-CM

## 2020-03-31 RX ORDER — CLONAZEPAM 0.5 MG/1
0.5 TABLET ORAL 3 TIMES DAILY
Qty: 90 TABLET | Refills: 1 | Status: SHIPPED | OUTPATIENT
Start: 2020-03-31 | End: 2020-04-30 | Stop reason: SDUPTHER

## 2020-04-02 ENCOUNTER — PATIENT MESSAGE (OUTPATIENT)
Dept: OPHTHALMOLOGY | Facility: CLINIC | Age: 85
End: 2020-04-02

## 2020-04-02 DIAGNOSIS — H40.1133 PRIMARY OPEN ANGLE GLAUCOMA OF BOTH EYES, SEVERE STAGE: ICD-10-CM

## 2020-04-07 NOTE — PROGRESS NOTES
Digital Medicine: Clinician Follow-Up    Called patient and daughter for follow up.    Wants to check BP cuff once COVID pandemic is controlled. Continues to work on technique.     Patient endorses adherence to medication regimen. Patient denies hypotensive s/sx (lightheadedness, dizziness, nausea, fatigue); patient denies hypertensive s/sx (SOB, CP, severe headaches, changes in vision). Instructed patient to seek medical care if BP > 160/100 and is accompanied by hypertensive s/sx associated, patient confirms understanding.     Daughter, Violetta, states recent elevations likely from breathing treatments being administered, mixed with some stress and anxiety surrounding the COVID pandemic.     Reviewed recent readings. Per 2017 ACC/ AHA HTN guidelines (goal of BP < 140/90), current 30-day average is above goal. Controlled readings in clinic.       The history is provided by the patient.     Follow Up  Follow-up reason(s): reading review and routine education      Readings are trending down       INTERVENTION(S)  reviewed appropriate dose schedule, reviewed monitoring technique and encouragement/support    PLAN  patient verbalizes understanding, Health  follow up and continue monitoring    Continue current medication regimen. I will continue to monitor regularly and will follow-up in 2 to 3 months, sooner if blood pressure begins to trend upward or downward.     Encouraged patient/daughter to obtain cuff comparison when pandemic stabilizes.     Patient denies having questions or concerns. Patient has my contact information and knows to call with any concerns or clinical changes.      There are no preventive care reminders to display for this patient.    Last 5 Patient Entered Readings                                      Current 30 Day Average: 155/80     Recent Readings 3/30/2020 3/29/2020 3/27/2020 3/26/2020 3/25/2020    SBP (mmHg) 137 129 156 166 174    DBP (mmHg) 74 70 79 85 83    Pulse 72 77 69 64 64              Hypertension Medications             losartan (COZAAR) 50 MG tablet TAKE ONE TABLET BY MOUTH ONCE A DAY                 Screenings

## 2020-04-13 DIAGNOSIS — M25.511 CHRONIC RIGHT SHOULDER PAIN: ICD-10-CM

## 2020-04-13 DIAGNOSIS — M79.671 BILATERAL FOOT PAIN: ICD-10-CM

## 2020-04-13 DIAGNOSIS — M79.672 BILATERAL FOOT PAIN: ICD-10-CM

## 2020-04-13 DIAGNOSIS — G89.29 CHRONIC RIGHT SHOULDER PAIN: ICD-10-CM

## 2020-04-13 RX ORDER — HYDROCODONE BITARTRATE AND ACETAMINOPHEN 5; 325 MG/1; MG/1
TABLET ORAL
Qty: 75 TABLET | Refills: 0 | Status: SHIPPED | OUTPATIENT
Start: 2020-04-13 | End: 2020-05-19 | Stop reason: SDUPTHER

## 2020-04-17 ENCOUNTER — PATIENT OUTREACH (OUTPATIENT)
Dept: OTHER | Facility: OTHER | Age: 85
End: 2020-04-17

## 2020-04-30 DIAGNOSIS — J43.9 PULMONARY EMPHYSEMA, UNSPECIFIED EMPHYSEMA TYPE: ICD-10-CM

## 2020-05-01 RX ORDER — CLONAZEPAM 0.5 MG/1
0.5 TABLET ORAL 3 TIMES DAILY
Qty: 90 TABLET | Refills: 1 | Status: SHIPPED | OUTPATIENT
Start: 2020-05-01 | End: 2020-05-31 | Stop reason: SDUPTHER

## 2020-05-19 DIAGNOSIS — M79.672 BILATERAL FOOT PAIN: ICD-10-CM

## 2020-05-19 DIAGNOSIS — M79.671 BILATERAL FOOT PAIN: ICD-10-CM

## 2020-05-19 DIAGNOSIS — G89.29 CHRONIC RIGHT SHOULDER PAIN: ICD-10-CM

## 2020-05-19 DIAGNOSIS — M25.511 CHRONIC RIGHT SHOULDER PAIN: ICD-10-CM

## 2020-05-19 RX ORDER — HYDROCODONE BITARTRATE AND ACETAMINOPHEN 5; 325 MG/1; MG/1
TABLET ORAL
Qty: 75 TABLET | Refills: 0 | Status: SHIPPED | OUTPATIENT
Start: 2020-05-19 | End: 2020-06-29 | Stop reason: SDUPTHER

## 2020-05-29 RX ORDER — LEVOTHYROXINE SODIUM 50 UG/1
TABLET ORAL
Qty: 90 TABLET | Refills: 0 | Status: SHIPPED | OUTPATIENT
Start: 2020-05-29 | End: 2020-11-24 | Stop reason: SDUPTHER

## 2020-05-31 DIAGNOSIS — J43.9 PULMONARY EMPHYSEMA, UNSPECIFIED EMPHYSEMA TYPE: ICD-10-CM

## 2020-06-01 ENCOUNTER — TELEPHONE (OUTPATIENT)
Dept: OPHTHALMOLOGY | Facility: CLINIC | Age: 85
End: 2020-06-01

## 2020-06-01 RX ORDER — CLONAZEPAM 0.5 MG/1
0.5 TABLET ORAL 3 TIMES DAILY
Qty: 90 TABLET | Refills: 1 | Status: SHIPPED | OUTPATIENT
Start: 2020-06-01 | End: 2020-06-29 | Stop reason: SDUPTHER

## 2020-06-01 NOTE — TELEPHONE ENCOUNTER
Dr. Verduzco received a fax from [employer] [facility] requesting to change Vyzulta to either Dorzolamide/Timolol or Timolol. I called and the patient is paying $200 for the Vyzulta. Dr. Verduzco will review the patient's chart and discuss this matter at the patient's next appointment on Friday.

## 2020-06-04 ENCOUNTER — PATIENT OUTREACH (OUTPATIENT)
Dept: ADMINISTRATIVE | Facility: OTHER | Age: 85
End: 2020-06-04

## 2020-06-05 ENCOUNTER — OFFICE VISIT (OUTPATIENT)
Dept: OPHTHALMOLOGY | Facility: CLINIC | Age: 85
End: 2020-06-05
Payer: MEDICARE

## 2020-06-05 DIAGNOSIS — H04.129 DRY EYE: ICD-10-CM

## 2020-06-05 DIAGNOSIS — Z96.1 PSEUDOPHAKIA OF BOTH EYES: ICD-10-CM

## 2020-06-05 DIAGNOSIS — H40.1133 PRIMARY OPEN ANGLE GLAUCOMA OF BOTH EYES, SEVERE STAGE: Primary | ICD-10-CM

## 2020-06-05 PROCEDURE — 92012 PR EYE EXAM, EST PATIENT,INTERMED: ICD-10-PCS | Mod: HCNC,S$GLB,, | Performed by: OPHTHALMOLOGY

## 2020-06-05 PROCEDURE — 92133 CPTRZD OPH DX IMG PST SGM ON: CPT | Mod: HCNC,S$GLB,, | Performed by: OPHTHALMOLOGY

## 2020-06-05 PROCEDURE — 99999 PR PBB SHADOW E&M-EST. PATIENT-LVL II: CPT | Mod: PBBFAC,HCNC,, | Performed by: OPHTHALMOLOGY

## 2020-06-05 PROCEDURE — 92133 POSTERIOR SEGMENT OCT OPTIC NERVE(OCULAR COHERENCE TOMOGRAPHY) - OU - BOTH EYES: ICD-10-PCS | Mod: HCNC,S$GLB,, | Performed by: OPHTHALMOLOGY

## 2020-06-05 PROCEDURE — 99999 PR PBB SHADOW E&M-EST. PATIENT-LVL II: ICD-10-PCS | Mod: PBBFAC,HCNC,, | Performed by: OPHTHALMOLOGY

## 2020-06-05 PROCEDURE — 92012 INTRM OPH EXAM EST PATIENT: CPT | Mod: HCNC,S$GLB,, | Performed by: OPHTHALMOLOGY

## 2020-06-05 RX ORDER — ALLOPURINOL 100 MG/1
TABLET ORAL
Qty: 180 TABLET | Refills: 0 | Status: SHIPPED | OUTPATIENT
Start: 2020-06-05 | End: 2020-09-12 | Stop reason: SDUPTHER

## 2020-06-05 RX ORDER — BIMATOPROST 0.3 MG/ML
1 SOLUTION/ DROPS OPHTHALMIC NIGHTLY
Qty: 2.5 ML | Refills: 12 | Status: SHIPPED | OUTPATIENT
Start: 2020-06-05

## 2020-06-05 NOTE — PROGRESS NOTES
SUBJECTIVE  Anju CARISSA Rivera is 89 y.o. female  Corrected distance visual acuity was 20/50 in the right eye and 20/25 -2 in the left eye.   Chief Complaint   Patient presents with    Glaucoma          HPI     Pt here for 3 month iop check with goct         1. PCIOL OU  2. Severe Coag with enlarged cups (init 38/18)OD>OS Goal < 16  Vf progression OD 11/17 add Brimonidine  Brimonidine-D/C due to drowsiness,redness,irritation   Rhopressa QHS OU (not using due to redness)  3. PVD OD  4. H/O narrow angles  5. Blepharitis  6 YUNG OU      vyzulta qhs OU  Systane PRN OU    Last edited by Evi Callejas MA on 6/5/2020 11:14 AM. (History)         Assessment /Plan :  1. Primary open angle glaucoma of both eyes, severe stage will change vyzulta to  Bimatoprost 0.03% can use vyzulta until out   2. Pseudophakia of both eyes  -- Condition stable, no therapeutic change required. Monitoring routinely.     3. Dry eye recommend at's qid ou            RTC 2 months iop check

## 2020-06-05 NOTE — PROGRESS NOTES
Chart reviewed.   Immunizations: Triggered Imm Registry     Orders placed: n/a  Upcoming appts to satisfy HUMBLE topics: n/a

## 2020-06-09 ENCOUNTER — PATIENT OUTREACH (OUTPATIENT)
Dept: OTHER | Facility: OTHER | Age: 85
End: 2020-06-09

## 2020-06-17 RX ORDER — BIMATOPROST 0.3 MG/ML
1 SOLUTION/ DROPS OPHTHALMIC NIGHTLY
Qty: 30 DROP | Refills: 11 | Status: CANCELLED | OUTPATIENT
Start: 2020-06-17 | End: 2021-06-17

## 2020-06-17 RX ORDER — BIMATOPROST 0.3 MG/ML
1 SOLUTION/ DROPS OPHTHALMIC NIGHTLY
Qty: 2.5 ML | Refills: 12 | Status: CANCELLED | OUTPATIENT
Start: 2020-06-17

## 2020-06-18 RX ORDER — BIMATOPROST 0.1 MG/ML
1 SOLUTION/ DROPS OPHTHALMIC NIGHTLY
Qty: 2.5 ML | Refills: 11 | Status: SHIPPED | OUTPATIENT
Start: 2020-06-18 | End: 2021-06-18

## 2020-06-29 ENCOUNTER — OFFICE VISIT (OUTPATIENT)
Dept: INTERNAL MEDICINE | Facility: CLINIC | Age: 85
End: 2020-06-29
Payer: MEDICARE

## 2020-06-29 DIAGNOSIS — I10 ESSENTIAL HYPERTENSION: Chronic | ICD-10-CM

## 2020-06-29 DIAGNOSIS — F32.5 MAJOR DEPRESSIVE DISORDER WITH SINGLE EPISODE, IN REMISSION: ICD-10-CM

## 2020-06-29 DIAGNOSIS — I25.118 CORONARY ARTERY DISEASE OF NATIVE ARTERY OF NATIVE HEART WITH STABLE ANGINA PECTORIS: Chronic | ICD-10-CM

## 2020-06-29 DIAGNOSIS — F41.9 ANXIETY: ICD-10-CM

## 2020-06-29 DIAGNOSIS — J43.9 PULMONARY EMPHYSEMA, UNSPECIFIED EMPHYSEMA TYPE: Primary | Chronic | ICD-10-CM

## 2020-06-29 DIAGNOSIS — E03.9 ACQUIRED HYPOTHYROIDISM: ICD-10-CM

## 2020-06-29 DIAGNOSIS — G89.29 CHRONIC RIGHT SHOULDER PAIN: ICD-10-CM

## 2020-06-29 DIAGNOSIS — M25.511 CHRONIC RIGHT SHOULDER PAIN: ICD-10-CM

## 2020-06-29 PROCEDURE — 1101F PT FALLS ASSESS-DOCD LE1/YR: CPT | Mod: HCNC,CPTII,95, | Performed by: FAMILY MEDICINE

## 2020-06-29 PROCEDURE — 1159F MED LIST DOCD IN RCRD: CPT | Mod: HCNC,95,, | Performed by: FAMILY MEDICINE

## 2020-06-29 PROCEDURE — 1159F PR MEDICATION LIST DOCUMENTED IN MEDICAL RECORD: ICD-10-PCS | Mod: HCNC,95,, | Performed by: FAMILY MEDICINE

## 2020-06-29 PROCEDURE — 99214 PR OFFICE/OUTPT VISIT, EST, LEVL IV, 30-39 MIN: ICD-10-PCS | Mod: HCNC,95,, | Performed by: FAMILY MEDICINE

## 2020-06-29 PROCEDURE — 99214 OFFICE O/P EST MOD 30 MIN: CPT | Mod: HCNC,95,, | Performed by: FAMILY MEDICINE

## 2020-06-29 PROCEDURE — 99499 UNLISTED E&M SERVICE: CPT | Mod: HCNC,95,, | Performed by: FAMILY MEDICINE

## 2020-06-29 PROCEDURE — 99499 RISK ADDL DX/OHS AUDIT: ICD-10-PCS | Mod: HCNC,95,, | Performed by: FAMILY MEDICINE

## 2020-06-29 PROCEDURE — 1101F PR PT FALLS ASSESS DOC 0-1 FALLS W/OUT INJ PAST YR: ICD-10-PCS | Mod: HCNC,CPTII,95, | Performed by: FAMILY MEDICINE

## 2020-06-29 RX ORDER — CLONAZEPAM 0.5 MG/1
0.5 TABLET ORAL 3 TIMES DAILY
Qty: 90 TABLET | Refills: 1 | Status: SHIPPED | OUTPATIENT
Start: 2020-06-29 | End: 2020-07-28 | Stop reason: SDUPTHER

## 2020-06-29 RX ORDER — HYDROCODONE BITARTRATE AND ACETAMINOPHEN 5; 325 MG/1; MG/1
TABLET ORAL
Qty: 75 TABLET | Refills: 0 | Status: SHIPPED | OUTPATIENT
Start: 2020-06-29 | End: 2020-08-04 | Stop reason: SDUPTHER

## 2020-06-29 NOTE — PROGRESS NOTES
Subjective:       Patient ID: Anju Rivera is a 89 y.o. female.    Chief Complaint: No chief complaint on file.    The patient location is: Home  The chief complaint leading to consultation is: F/U  Visit type: AudioVisual    Face to Face time with patient: 15 minutes of total time spent on the encounter, which includes face to face time and non-face to face time preparing to see the patient (eg, review of tests), Obtaining and/or reviewing separately obtained history, Documenting clinical information in the electronic or other health record, Independently interpreting results (not separately reported) and communicating results to the patient/family/caregiver, or Care coordination (not separately reported).         Each patient to whom he or she provides medical services by telemedicine is:  (1) informed of the relationship between the physician and patient and the respective role of any other health care provider with respect to management of the patient; and (2) notified that he or she may decline to receive medical services by telemedicine and may withdraw from such care at any time.    Notes:      Pt is a 89 year old who is over all stable with chronic conditions. Pt is feeling weak at times. Discussed supplemental nutrition. No cough or Fever. SOB is stable.    Review of Systems   Constitutional: Negative for activity change and unexpected weight change.   HENT: Negative for hearing loss, rhinorrhea and trouble swallowing.    Eyes: Negative for discharge and visual disturbance.   Respiratory: Positive for chest tightness and wheezing.    Cardiovascular: Negative for chest pain and palpitations.   Gastrointestinal: Negative for blood in stool, constipation, diarrhea and vomiting.   Endocrine: Negative for polydipsia and polyuria.   Genitourinary: Negative for difficulty urinating, dysuria, hematuria and menstrual problem.   Musculoskeletal: Positive for arthralgias and joint swelling. Negative for neck  pain.   Neurological: Positive for weakness. Negative for headaches.   Psychiatric/Behavioral: Positive for dysphoric mood. Negative for confusion.         Objective:      Physical Exam  Constitutional:       Appearance: Normal appearance.   Neck:      Musculoskeletal: Normal range of motion and neck supple.   Neurological:      General: No focal deficit present.      Mental Status: She is alert and oriented to person, place, and time.   Psychiatric:         Mood and Affect: Mood normal.         Assessment:       1. Pulmonary emphysema, unspecified emphysema type    2. Essential hypertension    3. Coronary artery disease of native artery of native heart with stable angina pectoris    4. Acquired hypothyroidism    5. Anxiety    6. Major depressive disorder with single episode, in remission    7. Chronic right shoulder pain        Plan:       Pulmonary emphysema, unspecified emphysema type  Comments:  Stable at this time. Still using sup 02  Orders:  -     clonazePAM (KLONOPIN) 0.5 MG tablet; Take 1 tablet (0.5 mg total) by mouth 3 (three) times daily.  Dispense: 90 tablet; Refill: 1    Essential hypertension  Comments:  BP was reported as stable  Orders:  -     CBC auto differential; Future; Expected date: 06/29/2020  -     Comprehensive metabolic panel; Future; Expected date: 06/29/2020    Coronary artery disease of native artery of native heart with stable angina pectoris  Comments:  stable    Acquired hypothyroidism  Comments:  thyroid hormone is stable  Orders:  -     T4, free; Future; Expected date: 06/29/2020  -     TSH; Future; Expected date: 06/29/2020    Anxiety  Comments:  Will continue with the klonopin    Major depressive disorder with single episode, in remission  Comments:  stable    Chronic right shoulder pain  Comments:  Will continue with the norco as needed  Orders:  -     HYDROcodone-acetaminophen (NORCO) 5-325 mg per tablet; Take 1/2 to 1 tablet one time during the the day as needed and 1 1/2  tablet at night  Dispense: 75 tablet; Refill: 0       Consult Start Time: 07/01/2020 10:45  Consult End Time: 07/01/2020 11:01

## 2020-07-01 ENCOUNTER — LAB VISIT (OUTPATIENT)
Dept: LAB | Facility: HOSPITAL | Age: 85
End: 2020-07-01
Attending: FAMILY MEDICINE
Payer: MEDICARE

## 2020-07-01 DIAGNOSIS — I10 ESSENTIAL HYPERTENSION: Chronic | ICD-10-CM

## 2020-07-01 DIAGNOSIS — E03.9 ACQUIRED HYPOTHYROIDISM: ICD-10-CM

## 2020-07-01 LAB
ALBUMIN SERPL BCP-MCNC: 3.2 G/DL (ref 3.5–5.2)
ALP SERPL-CCNC: 66 U/L (ref 55–135)
ALT SERPL W/O P-5'-P-CCNC: 10 U/L (ref 10–44)
ANION GAP SERPL CALC-SCNC: 9 MMOL/L (ref 8–16)
AST SERPL-CCNC: 22 U/L (ref 10–40)
BASOPHILS # BLD AUTO: 0.01 K/UL (ref 0–0.2)
BASOPHILS NFR BLD: 0.1 % (ref 0–1.9)
BILIRUB SERPL-MCNC: 0.2 MG/DL (ref 0.1–1)
BUN SERPL-MCNC: 34 MG/DL (ref 8–23)
CALCIUM SERPL-MCNC: 9.2 MG/DL (ref 8.7–10.5)
CHLORIDE SERPL-SCNC: 103 MMOL/L (ref 95–110)
CO2 SERPL-SCNC: 25 MMOL/L (ref 23–29)
CREAT SERPL-MCNC: 1.4 MG/DL (ref 0.5–1.4)
DIFFERENTIAL METHOD: ABNORMAL
EOSINOPHIL # BLD AUTO: 0.2 K/UL (ref 0–0.5)
EOSINOPHIL NFR BLD: 2.5 % (ref 0–8)
ERYTHROCYTE [DISTWIDTH] IN BLOOD BY AUTOMATED COUNT: 13.8 % (ref 11.5–14.5)
EST. GFR  (AFRICAN AMERICAN): 38.2 ML/MIN/1.73 M^2
EST. GFR  (NON AFRICAN AMERICAN): 33.1 ML/MIN/1.73 M^2
GLUCOSE SERPL-MCNC: 62 MG/DL (ref 70–110)
HCT VFR BLD AUTO: 30.1 % (ref 37–48.5)
HGB BLD-MCNC: 9 G/DL (ref 12–16)
IMM GRANULOCYTES # BLD AUTO: 0.02 K/UL (ref 0–0.04)
IMM GRANULOCYTES NFR BLD AUTO: 0.3 % (ref 0–0.5)
LYMPHOCYTES # BLD AUTO: 1.1 K/UL (ref 1–4.8)
LYMPHOCYTES NFR BLD: 14.8 % (ref 18–48)
MCH RBC QN AUTO: 31 PG (ref 27–31)
MCHC RBC AUTO-ENTMCNC: 29.9 G/DL (ref 32–36)
MCV RBC AUTO: 104 FL (ref 82–98)
MONOCYTES # BLD AUTO: 0.6 K/UL (ref 0.3–1)
MONOCYTES NFR BLD: 8.3 % (ref 4–15)
NEUTROPHILS # BLD AUTO: 5.6 K/UL (ref 1.8–7.7)
NEUTROPHILS NFR BLD: 74 % (ref 38–73)
NRBC BLD-RTO: 0 /100 WBC
PLATELET # BLD AUTO: 225 K/UL (ref 150–350)
PMV BLD AUTO: 12.1 FL (ref 9.2–12.9)
POTASSIUM SERPL-SCNC: 4.8 MMOL/L (ref 3.5–5.1)
PROT SERPL-MCNC: 5.9 G/DL (ref 6–8.4)
RBC # BLD AUTO: 2.9 M/UL (ref 4–5.4)
SODIUM SERPL-SCNC: 137 MMOL/L (ref 136–145)
T4 FREE SERPL-MCNC: 0.83 NG/DL (ref 0.71–1.51)
TSH SERPL DL<=0.005 MIU/L-ACNC: 1.43 UIU/ML (ref 0.4–4)
WBC # BLD AUTO: 7.5 K/UL (ref 3.9–12.7)

## 2020-07-01 PROCEDURE — 84439 ASSAY OF FREE THYROXINE: CPT | Mod: HCNC

## 2020-07-01 PROCEDURE — 84443 ASSAY THYROID STIM HORMONE: CPT | Mod: HCNC

## 2020-07-01 PROCEDURE — 36415 COLL VENOUS BLD VENIPUNCTURE: CPT | Mod: HCNC

## 2020-07-01 PROCEDURE — 80053 COMPREHEN METABOLIC PANEL: CPT | Mod: HCNC

## 2020-07-01 PROCEDURE — 85025 COMPLETE CBC W/AUTO DIFF WBC: CPT | Mod: HCNC

## 2020-07-26 ENCOUNTER — PATIENT OUTREACH (OUTPATIENT)
Dept: ADMINISTRATIVE | Facility: OTHER | Age: 85
End: 2020-07-26

## 2020-07-26 NOTE — PROGRESS NOTES
Requested updates within Care Everywhere.  Patient's chart was reviewed for overdue HUMBLE topics.  Immunizations reconciled.

## 2020-07-27 ENCOUNTER — OFFICE VISIT (OUTPATIENT)
Dept: PULMONOLOGY | Facility: CLINIC | Age: 85
End: 2020-07-27
Payer: MEDICARE

## 2020-07-27 VITALS
DIASTOLIC BLOOD PRESSURE: 70 MMHG | HEIGHT: 63 IN | BODY MASS INDEX: 13.6 KG/M2 | OXYGEN SATURATION: 98 % | SYSTOLIC BLOOD PRESSURE: 142 MMHG | WEIGHT: 76.75 LBS | RESPIRATION RATE: 18 BRPM | HEART RATE: 75 BPM

## 2020-07-27 DIAGNOSIS — R06.02 SOB (SHORTNESS OF BREATH): ICD-10-CM

## 2020-07-27 DIAGNOSIS — R06.02 SHORTNESS OF BREATH: ICD-10-CM

## 2020-07-27 DIAGNOSIS — I50.32 CHRONIC DIASTOLIC HEART FAILURE: ICD-10-CM

## 2020-07-27 DIAGNOSIS — J43.9 PULMONARY EMPHYSEMA, UNSPECIFIED EMPHYSEMA TYPE: ICD-10-CM

## 2020-07-27 DIAGNOSIS — J44.9 CHRONIC OBSTRUCTIVE PULMONARY DISEASE, UNSPECIFIED COPD TYPE: Primary | ICD-10-CM

## 2020-07-27 PROCEDURE — 99214 PR OFFICE/OUTPT VISIT, EST, LEVL IV, 30-39 MIN: ICD-10-PCS | Mod: HCNC,S$GLB,, | Performed by: NURSE PRACTITIONER

## 2020-07-27 PROCEDURE — 99999 PR PBB SHADOW E&M-EST. PATIENT-LVL V: ICD-10-PCS | Mod: PBBFAC,HCNC,, | Performed by: NURSE PRACTITIONER

## 2020-07-27 PROCEDURE — 1159F MED LIST DOCD IN RCRD: CPT | Mod: HCNC,S$GLB,, | Performed by: NURSE PRACTITIONER

## 2020-07-27 PROCEDURE — 1101F PR PT FALLS ASSESS DOC 0-1 FALLS W/OUT INJ PAST YR: ICD-10-PCS | Mod: HCNC,CPTII,S$GLB, | Performed by: NURSE PRACTITIONER

## 2020-07-27 PROCEDURE — 99999 PR PBB SHADOW E&M-EST. PATIENT-LVL V: CPT | Mod: PBBFAC,HCNC,, | Performed by: NURSE PRACTITIONER

## 2020-07-27 PROCEDURE — 99214 OFFICE O/P EST MOD 30 MIN: CPT | Mod: HCNC,S$GLB,, | Performed by: NURSE PRACTITIONER

## 2020-07-27 PROCEDURE — 1101F PT FALLS ASSESS-DOCD LE1/YR: CPT | Mod: HCNC,CPTII,S$GLB, | Performed by: NURSE PRACTITIONER

## 2020-07-27 PROCEDURE — 1159F PR MEDICATION LIST DOCUMENTED IN MEDICAL RECORD: ICD-10-PCS | Mod: HCNC,S$GLB,, | Performed by: NURSE PRACTITIONER

## 2020-07-27 NOTE — PROGRESS NOTES
"Subjective:      Patient ID: Anju Rivera is a 89 y.o. female.    Chief Complaint: COPD    COPD  Associated symptoms include arthralgias, fatigue and weakness.   Follow up for COPD. Now on BREO. Taking Duoneb 2x day.   Stable breathing. In wheelchair. Weight 76 lb today. She is going to start back on Boost.   Anemic on iron supplements.  On 2L of oxygen and benefiting from use.     Patient Active Problem List   Diagnosis    Coronary artery disease    Chronic diastolic heart failure    Mitral regurgitation    COPD (chronic obstructive pulmonary disease)    Hypertension    Aortic valve disorder    Pulmonary hypertension    Mixed hyperlipidemia    COAG (chronic open-angle glaucoma) - Both Eyes    HX of MI    Hypothyroidism    Peripheral vascular disease    Amputation of left great toe    Anxiety    Shortness of breath    PVC's (premature ventricular contractions)    Proteinuria    Chronic kidney disease, stage III (moderate)    History of breast cancer    History of skin cancer    Major depression    Carotid artery disease    Tortuous aorta    Pain    Primary open angle glaucoma of both eyes, severe stage    Pseudophakia of both eyes    Refractive error    Primary osteoarthritis of right shoulder    Osteoporosis    Bilateral foot pain    Chronic right shoulder pain    Nonrheumatic aortic valve stenosis    Abnormal ECG         BP (!) 142/70   Pulse 75   Resp 18   Ht 5' 3" (1.6 m)   Wt 34.8 kg (76 lb 11.5 oz)   SpO2 98%   BMI 13.59 kg/m²   Body mass index is 13.59 kg/m².    Review of Systems   Constitutional: Positive for fatigue.   HENT: Negative.    Respiratory: Positive for dyspnea on extertion.    Cardiovascular: Negative.    Musculoskeletal: Positive for arthralgias and gait problem.   Gastrointestinal: Negative.    Neurological: Positive for weakness.   Psychiatric/Behavioral: Negative.      Objective:      Physical Exam  Constitutional:       Appearance: She is " underweight.   HENT:      Head: Normocephalic and atraumatic.   Neck:      Musculoskeletal: Normal range of motion and neck supple.   Cardiovascular:      Rate and Rhythm: Normal rate and regular rhythm.   Pulmonary:      Effort: Pulmonary effort is normal.      Breath sounds: Normal breath sounds.   Skin:     General: Skin is warm and dry.   Neurological:      General: No focal deficit present.      Mental Status: She is alert and oriented to person, place, and time.   Psychiatric:         Mood and Affect: Mood normal.         Behavior: Behavior normal.       Personal Diagnostic Review      Results for orders placed during the hospital encounter of 01/27/20   X-Ray Chest PA And Lateral    Narrative EXAMINATION:  XR CHEST PA AND LATERAL    CLINICAL HISTORY:  Chronic obstructive pulmonary disease, unspecified    TECHNIQUE:  PA and lateral views of the chest were performed.    COMPARISON:  01/28/2019 chest radiographs    FINDINGS:  Bilateral similar appearing chronic changes in the lungs with biapical areas of pleuroparenchymal scarring.  Coarsening of the bronchovascular markings.  Hyperexpanded appearance of the lungs.  No new or confluent airspace opacity.  Cardiomediastinal silhouette is not enlarged.  Aortic atherosclerosis is noted.  Multilevel degenerative changes of the spine with osteopenia.  Chronic appearing deformity of the right proximal humerus.  Hiatal hernia.  Surgical clips in the right axilla.  Detail limited by rotation.      Impression Unchanged appearance of the chest.  No active disease      Electronically signed by: Jose L Power MD  Date:    01/27/2020  Time:    18:42         Assessment:       1. Chronic obstructive pulmonary disease, unspecified COPD type    2. Pulmonary emphysema, unspecified emphysema type    3. SOB (shortness of breath)    4. Shortness of breath    5. Chronic diastolic heart failure        Outpatient Encounter Medications as of 7/27/2020   Medication Sig Dispense Refill     albuterol (VENTOLIN HFA) 90 mcg/actuation inhaler Inhale 2 puffs into the lungs every 4 (four) hours as needed for Wheezing. 18 g 3    albuterol-ipratropium (DUO-NEB) 2.5 mg-0.5 mg/3 mL nebulizer solution USE 1 VIAL VIA NEBULIZER EVERY 6 TO 8 HOURS AS NEEDED FOR WHEEZING 360 mL 6    allopurinoL (ZYLOPRIM) 100 MG tablet TAKE 2 TABLETS BY MOUTH ONCE DAILY 180 tablet 0    aspirin (ECOTRIN) 81 MG EC tablet Take 81 mg by mouth once daily.      bimatoprost (LUMIGAN) 0.03 % ophthalmic drops Place 1 drop into both eyes nightly. 2.5 mL 12    bisacodyl (DULCOLAX) 5 mg EC tablet Take 5 mg by mouth daily as needed for Constipation.      CARBOXYMETHYLCELLULOSE SODIUM (REFRESH OPHT) Apply 1 drop to eye daily as needed.       citalopram (CELEXA) 40 MG tablet TAKE ONE TABLET BY MOUTH ONE TIME DAILY  90 tablet 2    clonazePAM (KLONOPIN) 0.5 MG tablet Take 1 tablet (0.5 mg total) by mouth 3 (three) times daily. 90 tablet 1    doxycycline (MONODOX) 100 MG capsule Take 1 capsule (100 mg total) by mouth 2 (two) times daily. 20 capsule 0    fluticasone furoate-vilanterol (BREO ELLIPTA) 100-25 mcg/dose diskus inhaler Inhale 1 puff into the lungs once daily. 1 each 11    HYDROcodone-acetaminophen (NORCO) 5-325 mg per tablet Take 1/2 to 1 tablet one time during the the day as needed and 1 1/2 tablet at night 75 tablet 0    latanoprost 0.005 % ophthalmic solution Place 1 drop into both eyes every evening. 2.5 mL 12    latanoprostene bunod (VYZULTA) 0.024 % Drop Apply 1 drop to eye every evening. 2.5 mL 12    levothyroxine (SYNTHROID) 50 MCG tablet TAKE 1 TABLET BY MOUTH ONCE DAILY 90 tablet 0    levothyroxine (SYNTHROID) 50 MCG tablet TAKE 1 TABLET BY MOUTH ONCE DAILY 90 tablet 0    levothyroxine (SYNTHROID) 50 MCG tablet TAKE 1 TABLET BY MOUTH ONCE DAILY 90 tablet 2    levothyroxine (SYNTHROID) 50 MCG tablet Take 1 tablet (50 mcg total) by mouth once daily. 90 tablet 0    levothyroxine (SYNTHROID) 50 MCG tablet TAKE ONE  TABLET BY MOUTH ONE TIME DAILY  90 tablet 0    losartan (COZAAR) 50 MG tablet TAKE ONE TABLET BY MOUTH ONCE A DAY 90 tablet 1    LUMIGAN 0.01 % Drop Place 1 drop into both eyes every evening. 2.5 mL 11    MULTIVITAMIN Take 1 tablet by mouth Daily.      OXYGEN-AIR DELIVERY SYSTEMS MISC 2 L by Misc.(Non-Drug; Combo Route) route. Per min      psyllium (METAMUCIL) powder Take 1 packet by mouth once daily.      UNKNOWN TO PATIENT Cough syrup as needed      [DISCONTINUED] methylPREDNISolone (MEDROL DOSEPACK) 4 mg tablet use as directed 1 Package 0     No facility-administered encounter medications on file as of 7/27/2020.      No orders of the defined types were placed in this encounter.    Plan:        Problem List Items Addressed This Visit        Pulmonary    COPD (chronic obstructive pulmonary disease) - Primary (Chronic)    Overview     BREO, Albuterol, Duoneb.  2L oxygen and benefiting from use.         Current Assessment & Plan     Stable            Cardiac/Vascular    Chronic diastolic heart failure (Chronic)    Overview     -2D echo 2/5/2013: CONCLUSIONS     1 - Normal left ventricular function (EF 55%).     2 - Diastolic dysfunction.     3 - Concentric remodeling.             Other    Shortness of breath    Current Assessment & Plan     STable           Other Visit Diagnoses     SOB (shortness of breath)

## 2020-07-28 DIAGNOSIS — J43.9 PULMONARY EMPHYSEMA, UNSPECIFIED EMPHYSEMA TYPE: Chronic | ICD-10-CM

## 2020-07-30 RX ORDER — CLONAZEPAM 0.5 MG/1
0.5 TABLET ORAL 3 TIMES DAILY
Qty: 90 TABLET | Refills: 1 | Status: SHIPPED | OUTPATIENT
Start: 2020-07-30 | End: 2020-08-27 | Stop reason: SDUPTHER

## 2020-08-04 DIAGNOSIS — G89.29 CHRONIC RIGHT SHOULDER PAIN: ICD-10-CM

## 2020-08-04 DIAGNOSIS — M25.511 CHRONIC RIGHT SHOULDER PAIN: ICD-10-CM

## 2020-08-05 RX ORDER — HYDROCODONE BITARTRATE AND ACETAMINOPHEN 5; 325 MG/1; MG/1
TABLET ORAL
Qty: 75 TABLET | Refills: 0 | Status: SHIPPED | OUTPATIENT
Start: 2020-08-05 | End: 2020-09-09 | Stop reason: SDUPTHER

## 2020-08-06 ENCOUNTER — PATIENT OUTREACH (OUTPATIENT)
Dept: ADMINISTRATIVE | Facility: OTHER | Age: 85
End: 2020-08-06

## 2020-08-07 ENCOUNTER — OFFICE VISIT (OUTPATIENT)
Dept: OPHTHALMOLOGY | Facility: CLINIC | Age: 85
End: 2020-08-07
Payer: MEDICARE

## 2020-08-07 DIAGNOSIS — H04.129 DRY EYE: ICD-10-CM

## 2020-08-07 DIAGNOSIS — Z96.1 PSEUDOPHAKIA OF BOTH EYES: ICD-10-CM

## 2020-08-07 DIAGNOSIS — H40.1133 PRIMARY OPEN ANGLE GLAUCOMA OF BOTH EYES, SEVERE STAGE: Primary | ICD-10-CM

## 2020-08-07 PROCEDURE — 92012 PR EYE EXAM, EST PATIENT,INTERMED: ICD-10-PCS | Mod: HCNC,S$GLB,, | Performed by: OPHTHALMOLOGY

## 2020-08-07 PROCEDURE — 92012 INTRM OPH EXAM EST PATIENT: CPT | Mod: HCNC,S$GLB,, | Performed by: OPHTHALMOLOGY

## 2020-08-07 PROCEDURE — 99999 PR PBB SHADOW E&M-EST. PATIENT-LVL III: ICD-10-PCS | Mod: PBBFAC,HCNC,, | Performed by: OPHTHALMOLOGY

## 2020-08-07 PROCEDURE — 99999 PR PBB SHADOW E&M-EST. PATIENT-LVL III: CPT | Mod: PBBFAC,HCNC,, | Performed by: OPHTHALMOLOGY

## 2020-08-07 NOTE — PROGRESS NOTES
SUBJECTIVE  Anju CARISSA Rivera is 89 y.o. female  Corrected distance visual acuity was 20/50 +1 in the right eye and 20/30 in the left eye.   Chief Complaint   Patient presents with    Glaucoma          HPI     Pt here for 2m IOP chk. No pain or discomfort. Pt's daughter says they   started a new lid scrub for blepharitis, but cannot remember the name,   100% compliant with gtts. Pt states she needs a stronger bifocal in her   glasses as it is getting harder to read.     Pt can get out of w/c     1. PCIOL OU  2. Severe Coag with enlarged cups (init 38/18)OD>OS Goal < 16  Vf progression OD 11/17 add Brimonidine  Brimonidine-D/C due to drowsiness,redness,irritation   Rhopressa QHS OU (not using due to redness)  3. PVD OD  4. H/O narrow angles  5. Blepharitis  6 YUNG OU      Bimatoprost 0.03% QHS OU  Systane PRN OU    Last edited by Milton Rene, Patient Care Assistant on 8/7/2020 11:02   AM. (History)         Assessment /Plan :  1. Primary open angle glaucoma of both eyes, severe stage   Doing well, IOP within acceptable range relative to target IOP and no evidence of progression. Continue current treatment. Reviewed importance of continued compliance with treatment and follow up.   well on change from vyzulta to bimatoprost .03%    Return to clinic in 3-4 months  or as needed.  With IOP Check     2. Pseudophakia of both eyes    3. Dry eye

## 2020-08-27 DIAGNOSIS — J43.9 PULMONARY EMPHYSEMA, UNSPECIFIED EMPHYSEMA TYPE: Chronic | ICD-10-CM

## 2020-08-31 RX ORDER — CLONAZEPAM 0.5 MG/1
0.5 TABLET ORAL 3 TIMES DAILY
Qty: 90 TABLET | Refills: 1 | Status: SHIPPED | OUTPATIENT
Start: 2020-08-31 | End: 2020-09-28 | Stop reason: SDUPTHER

## 2020-08-31 RX ORDER — LOSARTAN POTASSIUM 50 MG/1
TABLET ORAL
Qty: 90 TABLET | Refills: 0 | Status: SHIPPED | OUTPATIENT
Start: 2020-08-31 | End: 2020-12-14 | Stop reason: SDUPTHER

## 2020-09-09 DIAGNOSIS — G89.29 CHRONIC RIGHT SHOULDER PAIN: ICD-10-CM

## 2020-09-09 DIAGNOSIS — M25.511 CHRONIC RIGHT SHOULDER PAIN: ICD-10-CM

## 2020-09-10 RX ORDER — HYDROCODONE BITARTRATE AND ACETAMINOPHEN 5; 325 MG/1; MG/1
TABLET ORAL
Qty: 75 TABLET | Refills: 0 | Status: SHIPPED | OUTPATIENT
Start: 2020-09-10 | End: 2020-10-15 | Stop reason: SDUPTHER

## 2020-09-14 RX ORDER — ALLOPURINOL 100 MG/1
200 TABLET ORAL DAILY
Qty: 180 TABLET | Refills: 1 | Status: SHIPPED | OUTPATIENT
Start: 2020-09-14

## 2020-09-18 ENCOUNTER — LAB VISIT (OUTPATIENT)
Dept: LAB | Facility: HOSPITAL | Age: 85
End: 2020-09-18
Attending: FAMILY MEDICINE
Payer: MEDICARE

## 2020-09-18 ENCOUNTER — PATIENT MESSAGE (OUTPATIENT)
Dept: INTERNAL MEDICINE | Facility: CLINIC | Age: 85
End: 2020-09-18

## 2020-09-18 ENCOUNTER — OFFICE VISIT (OUTPATIENT)
Dept: INTERNAL MEDICINE | Facility: CLINIC | Age: 85
End: 2020-09-18
Payer: MEDICARE

## 2020-09-18 VITALS
DIASTOLIC BLOOD PRESSURE: 88 MMHG | OXYGEN SATURATION: 94 % | TEMPERATURE: 99 F | BODY MASS INDEX: 14.25 KG/M2 | HEIGHT: 63 IN | SYSTOLIC BLOOD PRESSURE: 142 MMHG | HEART RATE: 90 BPM | WEIGHT: 80.44 LBS | RESPIRATION RATE: 20 BRPM

## 2020-09-18 DIAGNOSIS — D64.9 ANEMIA, UNSPECIFIED TYPE: Primary | ICD-10-CM

## 2020-09-18 DIAGNOSIS — D64.9 ANEMIA, UNSPECIFIED TYPE: ICD-10-CM

## 2020-09-18 DIAGNOSIS — L03.039 CELLULITIS OF TOE, UNSPECIFIED LATERALITY: ICD-10-CM

## 2020-09-18 DIAGNOSIS — N30.00 ACUTE CYSTITIS WITHOUT HEMATURIA: ICD-10-CM

## 2020-09-18 DIAGNOSIS — I10 ESSENTIAL HYPERTENSION: Chronic | ICD-10-CM

## 2020-09-18 LAB
BASOPHILS # BLD AUTO: 0.02 K/UL (ref 0–0.2)
BASOPHILS NFR BLD: 0.3 % (ref 0–1.9)
DIFFERENTIAL METHOD: ABNORMAL
EOSINOPHIL # BLD AUTO: 0.1 K/UL (ref 0–0.5)
EOSINOPHIL NFR BLD: 1 % (ref 0–8)
ERYTHROCYTE [DISTWIDTH] IN BLOOD BY AUTOMATED COUNT: 13.9 % (ref 11.5–14.5)
HCT VFR BLD AUTO: 31.5 % (ref 37–48.5)
HGB BLD-MCNC: 9.7 G/DL (ref 12–16)
IMM GRANULOCYTES # BLD AUTO: 0.01 K/UL (ref 0–0.04)
IMM GRANULOCYTES NFR BLD AUTO: 0.1 % (ref 0–0.5)
LYMPHOCYTES # BLD AUTO: 1.1 K/UL (ref 1–4.8)
LYMPHOCYTES NFR BLD: 14.2 % (ref 18–48)
MCH RBC QN AUTO: 31.5 PG (ref 27–31)
MCHC RBC AUTO-ENTMCNC: 30.8 G/DL (ref 32–36)
MCV RBC AUTO: 102 FL (ref 82–98)
MONOCYTES # BLD AUTO: 0.8 K/UL (ref 0.3–1)
MONOCYTES NFR BLD: 9.9 % (ref 4–15)
NEUTROPHILS # BLD AUTO: 5.7 K/UL (ref 1.8–7.7)
NEUTROPHILS NFR BLD: 74.5 % (ref 38–73)
NRBC BLD-RTO: 0 /100 WBC
PLATELET # BLD AUTO: 263 K/UL (ref 150–350)
PMV BLD AUTO: 11.4 FL (ref 9.2–12.9)
RBC # BLD AUTO: 3.08 M/UL (ref 4–5.4)
WBC # BLD AUTO: 7.65 K/UL (ref 3.9–12.7)

## 2020-09-18 PROCEDURE — 99999 PR PBB SHADOW E&M-EST. PATIENT-LVL V: ICD-10-PCS | Mod: PBBFAC,HCNC,, | Performed by: FAMILY MEDICINE

## 2020-09-18 PROCEDURE — 99214 PR OFFICE/OUTPT VISIT, EST, LEVL IV, 30-39 MIN: ICD-10-PCS | Mod: HCNC,S$GLB,, | Performed by: FAMILY MEDICINE

## 2020-09-18 PROCEDURE — 85025 COMPLETE CBC W/AUTO DIFF WBC: CPT | Mod: HCNC

## 2020-09-18 PROCEDURE — 99214 OFFICE O/P EST MOD 30 MIN: CPT | Mod: HCNC,S$GLB,, | Performed by: FAMILY MEDICINE

## 2020-09-18 PROCEDURE — 1101F PR PT FALLS ASSESS DOC 0-1 FALLS W/OUT INJ PAST YR: ICD-10-PCS | Mod: HCNC,CPTII,S$GLB, | Performed by: FAMILY MEDICINE

## 2020-09-18 PROCEDURE — 99999 PR PBB SHADOW E&M-EST. PATIENT-LVL V: CPT | Mod: PBBFAC,HCNC,, | Performed by: FAMILY MEDICINE

## 2020-09-18 PROCEDURE — 1159F MED LIST DOCD IN RCRD: CPT | Mod: HCNC,S$GLB,, | Performed by: FAMILY MEDICINE

## 2020-09-18 PROCEDURE — 1159F PR MEDICATION LIST DOCUMENTED IN MEDICAL RECORD: ICD-10-PCS | Mod: HCNC,S$GLB,, | Performed by: FAMILY MEDICINE

## 2020-09-18 PROCEDURE — 36415 COLL VENOUS BLD VENIPUNCTURE: CPT | Mod: HCNC

## 2020-09-18 PROCEDURE — 1101F PT FALLS ASSESS-DOCD LE1/YR: CPT | Mod: HCNC,CPTII,S$GLB, | Performed by: FAMILY MEDICINE

## 2020-09-18 RX ORDER — SULFAMETHOXAZOLE AND TRIMETHOPRIM 800; 160 MG/1; MG/1
1 TABLET ORAL 2 TIMES DAILY
Qty: 20 TABLET | Refills: 0 | Status: SHIPPED | OUTPATIENT
Start: 2020-09-18 | End: 2020-09-21

## 2020-09-18 RX ORDER — FERROUS SULFATE 325(65) MG
TABLET ORAL
COMMUNITY
Start: 2020-07-28

## 2020-09-18 NOTE — PROGRESS NOTES
Subjective:       Patient ID: Anju Rivera is a 89 y.o. female.    Chief Complaint: Follow-up    Pt is a 89 year old who is here for check up. Pt does have hypothyroidism. Pt has very bad flow to the lower extremities and the     Review of Systems   Constitutional: Negative.    Respiratory: Negative.    Cardiovascular: Negative.    Genitourinary: Negative.    Hematological: Negative.    Psychiatric/Behavioral: Negative.          Objective:      Physical Exam  Constitutional:       Appearance: Normal appearance.   Neck:      Musculoskeletal: Normal range of motion and neck supple.   Cardiovascular:      Rate and Rhythm: Normal rate and regular rhythm.   Neurological:      General: No focal deficit present.      Mental Status: She is alert and oriented to person, place, and time.         Assessment:       1. Anemia, unspecified type    2. Essential hypertension        Plan:       Anemia, unspecified type  -     CBC auto differential; Future; Expected date: 09/18/2020    Essential hypertension  -     Comprehensive metabolic panel; Future; Expected date: 09/18/2020    Other orders  -     sulfamethoxazole-trimethoprim 800-160mg (BACTRIM DS) 800-160 mg Tab; Take 1 tablet by mouth 2 (two) times daily.  Dispense: 20 tablet; Refill: 0

## 2020-09-21 RX ORDER — DOXYCYCLINE HYCLATE 100 MG
100 TABLET ORAL EVERY 12 HOURS
Qty: 20 TABLET | Refills: 0 | Status: SHIPPED | OUTPATIENT
Start: 2020-09-21 | End: 2020-11-24 | Stop reason: ALTCHOICE

## 2020-09-23 ENCOUNTER — PATIENT MESSAGE (OUTPATIENT)
Dept: INTERNAL MEDICINE | Facility: CLINIC | Age: 85
End: 2020-09-23

## 2020-09-25 DIAGNOSIS — I25.118 CORONARY ARTERY DISEASE OF NATIVE ARTERY OF NATIVE HEART WITH STABLE ANGINA PECTORIS: Primary | Chronic | ICD-10-CM

## 2020-09-27 ENCOUNTER — PATIENT OUTREACH (OUTPATIENT)
Dept: ADMINISTRATIVE | Facility: OTHER | Age: 85
End: 2020-09-27

## 2020-09-28 ENCOUNTER — OFFICE VISIT (OUTPATIENT)
Dept: CARDIOLOGY | Facility: CLINIC | Age: 85
End: 2020-09-28
Payer: MEDICARE

## 2020-09-28 ENCOUNTER — HOSPITAL ENCOUNTER (OUTPATIENT)
Dept: CARDIOLOGY | Facility: HOSPITAL | Age: 85
Discharge: HOME | End: 2020-09-28
Attending: INTERNAL MEDICINE
Payer: MEDICARE

## 2020-09-28 VITALS
HEART RATE: 75 BPM | OXYGEN SATURATION: 98 % | SYSTOLIC BLOOD PRESSURE: 104 MMHG | HEIGHT: 63 IN | WEIGHT: 80 LBS | BODY MASS INDEX: 14.18 KG/M2 | DIASTOLIC BLOOD PRESSURE: 70 MMHG

## 2020-09-28 DIAGNOSIS — I10 ESSENTIAL HYPERTENSION: Chronic | ICD-10-CM

## 2020-09-28 DIAGNOSIS — R06.02 SHORTNESS OF BREATH: ICD-10-CM

## 2020-09-28 DIAGNOSIS — I73.9 PERIPHERAL VASCULAR DISEASE: ICD-10-CM

## 2020-09-28 DIAGNOSIS — I50.32 CHRONIC DIASTOLIC HEART FAILURE: Chronic | ICD-10-CM

## 2020-09-28 DIAGNOSIS — I25.2 OLD MI (MYOCARDIAL INFARCTION): Chronic | ICD-10-CM

## 2020-09-28 DIAGNOSIS — I49.3 PVC'S (PREMATURE VENTRICULAR CONTRACTIONS): ICD-10-CM

## 2020-09-28 DIAGNOSIS — I35.9 AORTIC VALVE DISORDER: Chronic | ICD-10-CM

## 2020-09-28 DIAGNOSIS — I27.20 PULMONARY HYPERTENSION: Chronic | ICD-10-CM

## 2020-09-28 DIAGNOSIS — I25.118 CORONARY ARTERY DISEASE OF NATIVE ARTERY OF NATIVE HEART WITH STABLE ANGINA PECTORIS: Chronic | ICD-10-CM

## 2020-09-28 DIAGNOSIS — I35.0 NONRHEUMATIC AORTIC VALVE STENOSIS: ICD-10-CM

## 2020-09-28 DIAGNOSIS — E78.2 MIXED HYPERLIPIDEMIA: Chronic | ICD-10-CM

## 2020-09-28 DIAGNOSIS — J43.9 PULMONARY EMPHYSEMA, UNSPECIFIED EMPHYSEMA TYPE: Chronic | ICD-10-CM

## 2020-09-28 DIAGNOSIS — I34.0 NONRHEUMATIC MITRAL VALVE REGURGITATION: Chronic | ICD-10-CM

## 2020-09-28 DIAGNOSIS — R94.31 ABNORMAL ECG: Primary | ICD-10-CM

## 2020-09-28 DIAGNOSIS — I65.23 BILATERAL CAROTID ARTERY STENOSIS: ICD-10-CM

## 2020-09-28 PROCEDURE — 1159F PR MEDICATION LIST DOCUMENTED IN MEDICAL RECORD: ICD-10-PCS | Mod: HCNC,S$GLB,, | Performed by: INTERNAL MEDICINE

## 2020-09-28 PROCEDURE — 99214 OFFICE O/P EST MOD 30 MIN: CPT | Mod: HCNC,S$GLB,, | Performed by: INTERNAL MEDICINE

## 2020-09-28 PROCEDURE — 93005 ELECTROCARDIOGRAM TRACING: CPT | Mod: HCNC

## 2020-09-28 PROCEDURE — 93010 EKG 12-LEAD: ICD-10-PCS | Mod: HCNC,,, | Performed by: INTERNAL MEDICINE

## 2020-09-28 PROCEDURE — 99999 PR PBB SHADOW E&M-EST. PATIENT-LVL III: ICD-10-PCS | Mod: PBBFAC,HCNC,, | Performed by: INTERNAL MEDICINE

## 2020-09-28 PROCEDURE — 99499 RISK ADDL DX/OHS AUDIT: ICD-10-PCS | Mod: HCNC,S$GLB,, | Performed by: INTERNAL MEDICINE

## 2020-09-28 PROCEDURE — 99499 UNLISTED E&M SERVICE: CPT | Mod: HCNC,S$GLB,, | Performed by: INTERNAL MEDICINE

## 2020-09-28 PROCEDURE — 1126F PR PAIN SEVERITY QUANTIFIED, NO PAIN PRESENT: ICD-10-PCS | Mod: HCNC,S$GLB,, | Performed by: INTERNAL MEDICINE

## 2020-09-28 PROCEDURE — 99214 PR OFFICE/OUTPT VISIT, EST, LEVL IV, 30-39 MIN: ICD-10-PCS | Mod: HCNC,S$GLB,, | Performed by: INTERNAL MEDICINE

## 2020-09-28 PROCEDURE — 1101F PT FALLS ASSESS-DOCD LE1/YR: CPT | Mod: HCNC,CPTII,S$GLB, | Performed by: INTERNAL MEDICINE

## 2020-09-28 PROCEDURE — 1126F AMNT PAIN NOTED NONE PRSNT: CPT | Mod: HCNC,S$GLB,, | Performed by: INTERNAL MEDICINE

## 2020-09-28 PROCEDURE — 93010 ELECTROCARDIOGRAM REPORT: CPT | Mod: HCNC,,, | Performed by: INTERNAL MEDICINE

## 2020-09-28 PROCEDURE — 1159F MED LIST DOCD IN RCRD: CPT | Mod: HCNC,S$GLB,, | Performed by: INTERNAL MEDICINE

## 2020-09-28 PROCEDURE — 1101F PR PT FALLS ASSESS DOC 0-1 FALLS W/OUT INJ PAST YR: ICD-10-PCS | Mod: HCNC,CPTII,S$GLB, | Performed by: INTERNAL MEDICINE

## 2020-09-28 PROCEDURE — 99999 PR PBB SHADOW E&M-EST. PATIENT-LVL III: CPT | Mod: PBBFAC,HCNC,, | Performed by: INTERNAL MEDICINE

## 2020-09-28 NOTE — PROGRESS NOTES
Subjective:    Patient ID:  Anju Rivera is a 89 y.o. female who presents for evaluation of Coronary Artery Disease, Congestive Heart Failure, Hyperlipidemia, Hypertension, Peripheral Arterial Disease, Shortness of Breath, and Carotid Artery Disease      HPI Pt returns for f/u.  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   Negative stress mpi 2/16.  Echo 2/16 showed normal LVEF, mild AS, mild PHTN.   She saw Dr. Jackson, [facility] surgery, last year for her PAD, with med mgt advised at her advanced age.  Echo 9/18 normal LV function, LVH, mild-mod AS.  Now here.  No active anginal sxs.  CHF seems stable.  She has chronic dyspnea.  On home O2.  She is more frail, weak with advanced age.  Has PAD, leg pains and discoloration.  On tx for possible L LE cellulitis by PCP.  ecg today NSR, LVH.  No acute changes.  BP controlled.   Enrolled in digital HTN program.  On asa qd.    Current Outpatient Medications   Medication Instructions    albuterol (VENTOLIN HFA) 90 mcg/actuation inhaler 2 puffs, Inhalation, Every 4 hours PRN    albuterol-ipratropium (DUO-NEB) 2.5 mg-0.5 mg/3 mL nebulizer solution USE 1 VIAL VIA NEBULIZER EVERY 6 TO 8 HOURS AS NEEDED FOR WHEEZING    allopurinoL (ZYLOPRIM) 200 mg, Oral, Daily    aspirin (ECOTRIN) 81 mg, Oral, Daily    bimatoprost (LUMIGAN) 0.03 % ophthalmic drops 1 drop, Both Eyes, Nightly    bisacodyL (DULCOLAX) 5 mg, Oral, Daily PRN    CARBOXYMETHYLCELLULOSE SODIUM (REFRESH OPHT) 1 drop, Ophthalmic, Daily PRN    citalopram (CELEXA) 40 MG tablet TAKE ONE TABLET BY MOUTH ONE TIME DAILY     clonazePAM (KLONOPIN) 0.5 mg, Oral, 3 times daily    doxycycline (VIBRA-TABS) 100 mg, Oral, Every 12 hours    facial-body wipes (CLEANSING EYELID WIPES  "TOP) No dose, route, or frequency recorded.    ferrous sulfate (IRON, FERROUS SULFATE,) 325 mg (65 mg iron) Tab tablet No dose, route, or frequency recorded.    fluticasone furoate-vilanterol (BREO ELLIPTA) 100-25 mcg/dose diskus inhaler 1 puff, Inhalation, Daily    HYDROcodone-acetaminophen (NORCO) 5-325 mg per tablet Take 1/2 to 1 tablet one time during the the day as needed and 1 1/2 tablet at night    latanoprost 0.005 % ophthalmic solution 1 drop, Both Eyes, Nightly    latanoprostene bunod (VYZULTA) 0.024 % Drop 1 drop, Ophthalmic, Nightly    levothyroxine (SYNTHROID) 50 MCG tablet TAKE 1 TABLET BY MOUTH ONCE DAILY    levothyroxine (SYNTHROID) 50 MCG tablet TAKE 1 TABLET BY MOUTH ONCE DAILY    levothyroxine (SYNTHROID) 50 MCG tablet TAKE 1 TABLET BY MOUTH ONCE DAILY    levothyroxine (SYNTHROID) 50 MCG tablet TAKE ONE TABLET BY MOUTH ONE TIME DAILY     levothyroxine (SYNTHROID) 50 mcg, Oral, Daily    losartan (COZAAR) 50 MG tablet TAKE ONE TABLET BY MOUTH ONCE A DAY    LUMIGAN 0.01 % Drop 1 drop, Both Eyes, Nightly    MULTIVITAMIN 1 tablet, Oral, Daily    OXYGEN-AIR DELIVERY SYSTEMS MISC 2 L, Misc.(Non-Drug; Combo Route), Per min    psyllium (METAMUCIL) powder 1 packet, Oral, Daily    UNKNOWN TO PATIENT Cough syrup as needed          Review of Systems   Constitution: Positive for malaise/fatigue and weight loss.   HENT: Negative.    Eyes: Negative.    Cardiovascular: Positive for claudication and dyspnea on exertion.   Respiratory: Positive for shortness of breath.    Endocrine: Negative.    Hematologic/Lymphatic: Negative.    Skin: Positive for rash.   Musculoskeletal: Positive for arthritis and joint pain.   Gastrointestinal: Negative.    Genitourinary: Negative.    Neurological: Positive for weakness.   Psychiatric/Behavioral: Negative.    Allergic/Immunologic: Negative.        /70 (BP Location: Left arm, Patient Position: Sitting, BP Method: Medium (Manual))   Pulse 75   Ht 5' 3" (1.6 " m)   Wt 36.3 kg (80 lb)   SpO2 98%   BMI 14.17 kg/m²     Wt Readings from Last 3 Encounters:   09/28/20 36.3 kg (80 lb)   09/18/20 36.5 kg (80 lb 7.5 oz)   07/27/20 34.8 kg (76 lb 11.5 oz)     Temp Readings from Last 3 Encounters:   09/18/20 99.1 °F (37.3 °C) (Tympanic)   01/27/20 99.1 °F (37.3 °C) (Tympanic)   12/17/19 98.6 °F (37 °C) (Tympanic)     BP Readings from Last 3 Encounters:   09/28/20 104/70   09/18/20 (!) 142/88   07/27/20 (!) 142/70     Pulse Readings from Last 3 Encounters:   09/28/20 75   09/18/20 90   07/27/20 75          Objective:    Physical Exam   Constitutional: She is oriented to person, place, and time. Vital signs are normal. She is active and cooperative. She has a sickly appearance. She appears ill. No distress.   Frail weak appearance   HENT:   Head: Normocephalic.   Neck: Neck supple. No JVD present. Carotid bruit is not present. No thyromegaly present.   Cardiovascular: Normal rate, regular rhythm, S1 normal, S2 normal and normal pulses. PMI is not displaced. Exam reveals no gallop and no friction rub.   Murmur heard.   Medium-pitched midsystolic murmur is present at the upper left sternal border.  Pulses:       Radial pulses are 2+ on the right side and 2+ on the left side.   Pulmonary/Chest: Effort normal. Tachypnea noted. She has no wheezes. She has rhonchi. She has no rales.   Abdominal: Soft. Normal appearance and bowel sounds are normal. There is no abdominal tenderness.   Musculoskeletal:         General: Edema present.   Lymphadenopathy:     She has no cervical adenopathy.   Neurological: She is alert and oriented to person, place, and time.   Skin: She is not diaphoretic. There is erythema.   Psychiatric: She has a normal mood and affect.   Nursing note and vitals reviewed.    I have reviewed all pertinent labs and cardiac studies.      Chemistry        Component Value Date/Time     07/01/2020 1208    K 4.8 07/01/2020 1208     07/01/2020 1208    CO2 25 07/01/2020  1208    BUN 34 (H) 07/01/2020 1208    CREATININE 1.4 07/01/2020 1208    GLU 62 (L) 07/01/2020 1208        Component Value Date/Time    CALCIUM 9.2 07/01/2020 1208    ALKPHOS 66 07/01/2020 1208    AST 22 07/01/2020 1208    ALT 10 07/01/2020 1208    BILITOT 0.2 07/01/2020 1208    ESTGFRAFRICA 38.2 (A) 07/01/2020 1208    EGFRNONAA 33.1 (A) 07/01/2020 1208        Lab Results   Component Value Date    WBC 7.65 09/18/2020    HGB 9.7 (L) 09/18/2020    HCT 31.5 (L) 09/18/2020     (H) 09/18/2020     09/18/2020       Lab Results   Component Value Date    HGBA1C 5.5 03/17/2008     Lab Results   Component Value Date    CHOL 190 04/29/2019    CHOL 189 04/17/2018    CHOL 179 08/01/2016     Lab Results   Component Value Date    HDL 84 (H) 04/29/2019    HDL 80 (H) 04/17/2018    HDL 61 08/01/2016     Lab Results   Component Value Date    LDLCALC 98.2 04/29/2019    LDLCALC 100.6 04/17/2018    LDLCALC 96.2 08/01/2016     Lab Results   Component Value Date    TRIG 39 04/29/2019    TRIG 42 04/17/2018    TRIG 109 08/01/2016     Lab Results   Component Value Date    CHOLHDL 44.2 04/29/2019    CHOLHDL 42.3 04/17/2018    CHOLHDL 34.1 08/01/2016           Assessment:       1. Abnormal ECG    2. Pulmonary emphysema, unspecified emphysema type    3. Nonrheumatic mitral valve regurgitation    4. Chronic diastolic heart failure    5. Coronary artery disease of native artery of native heart with stable angina pectoris    6. Pulmonary hypertension    7. Aortic valve disorder    8. Essential hypertension    9. Mixed hyperlipidemia    10. HX of MI    11. Shortness of breath    12. PVC's (premature ventricular contractions)    13. Bilateral carotid artery stenosis    14. Peripheral vascular disease    15. Nonrheumatic aortic valve stenosis         Plan:            Stable cardiovascular conditions at present time on current medical treatment.  Reviewed all tests and above medical conditions with patient in detail and formulated treatment  plan.  Continue optimal medical treatment for cardiovascular conditions.  Cardiac low salt diet discussed.  No changes in meds today.  Continue digital HTN program.  F/u with Dr. Jackson, vascular surgery, for any worsening PAD leg pain issues.  Conservative mgt advised at her advanced age/frail condition.  F/u in 1 year.

## 2020-09-28 NOTE — PROGRESS NOTES
"Digital Medicine: Clinician Follow-Up    Called patient to follow up. BP above goal, but very well controlled in clinic today.   Discussed technique with daughter, Violetta. No deficiencies identified.   Denies hypertensive symptoms.     Discussed that "mom" is not like she used to be, she is changing and her age is showing. Violetta considers her BP well controlled in the morning because it is after her Klonopin. She admits to increased sodium in her diet, but mom is selective on what she eats, so to ensure she is eating, they are feeding her what she will take in.     Has not charged cuff in a few weeks, will charge it tonight.     The history is provided by caregiver. The patient has granted authorization to speak to this person.      Review of patient's allergies indicates:   -- Atorvastatin -- Other (See Comments)    --  Dry mouth   -- Calcium channel blocking agent diltiazem analogues   Follow-up reason(s): routine follow up.     Hypertension    Readings are trending up   Patient is not experiencing signs/symptoms of hypotension.  Patient is not experiencing signs/symptoms of hypertension.          Last 5 Patient Entered Readings                                      Current 30 Day Average: 154/81     Recent Readings 9/21/2020 9/13/2020 9/5/2020 8/26/2020 8/17/2020    SBP (mmHg) 162 150 151 145 153    DBP (mmHg) 81 82 80 71 74    Pulse 81 71 72 72 69                 Depression Screening  Did not address depression screening.    Sleep Apnea Screening    Did not address sleep apnea screening.     Medication Affordability Screening  Did not address medication affordability screening.     Medication Adherence-Medication adherence was assessed.          ASSESSMENT(S)  Patients BP average is 154/81 mmHg, which is above goal. Patient's BP goal is less than or equal to 140/90 per 2017 ACC/AHA Hypertension Guidelines.     Given large discrepancy between in office and home readings, advised she charge cuff, obtain varying reading " for 2 weeks, and then we will discuss.     Take losartan 50 mg nightly.       Hypertension Plan  Additional monitoring needed. Varying times  Continue current therapy.  Instructed to charge device.       Addressed patient questions and patient has my contact information if needed prior to next outreach. Patient verbalizes understanding.                Topic    Lipid (Cholesterol) Test      There are no preventive care reminders to display for this patient.    Hypertension Medications             losartan (COZAAR) 50 MG tablet TAKE ONE TABLET BY MOUTH ONCE A DAY

## 2020-09-29 RX ORDER — CLONAZEPAM 0.5 MG/1
0.5 TABLET ORAL 3 TIMES DAILY
Qty: 90 TABLET | Refills: 1 | Status: SHIPPED | OUTPATIENT
Start: 2020-09-29 | End: 2020-10-30 | Stop reason: SDUPTHER

## 2020-10-03 ENCOUNTER — PATIENT MESSAGE (OUTPATIENT)
Dept: INTERNAL MEDICINE | Facility: CLINIC | Age: 85
End: 2020-10-03

## 2020-10-05 ENCOUNTER — TELEPHONE (OUTPATIENT)
Dept: INTERNAL MEDICINE | Facility: CLINIC | Age: 85
End: 2020-10-05

## 2020-10-05 RX ORDER — CEPHALEXIN 500 MG/1
500 CAPSULE ORAL EVERY 12 HOURS
Qty: 14 CAPSULE | Refills: 0 | Status: SHIPPED | OUTPATIENT
Start: 2020-10-05 | End: 2020-11-24 | Stop reason: ALTCHOICE

## 2020-10-07 NOTE — TELEPHONE ENCOUNTER
I s/w pt's daughter to verify that pt has been taking keflex in replace of the macrobid. She stated that they picked it up on Monday and she has been taking it since. //BJ

## 2020-10-15 DIAGNOSIS — N18.30 STAGE 3 CHRONIC KIDNEY DISEASE, UNSPECIFIED WHETHER STAGE 3A OR 3B CKD: ICD-10-CM

## 2020-10-15 DIAGNOSIS — I25.2 OLD MI (MYOCARDIAL INFARCTION): Chronic | ICD-10-CM

## 2020-10-15 DIAGNOSIS — J43.9 PULMONARY EMPHYSEMA, UNSPECIFIED EMPHYSEMA TYPE: Primary | Chronic | ICD-10-CM

## 2020-10-30 DIAGNOSIS — J43.9 PULMONARY EMPHYSEMA, UNSPECIFIED EMPHYSEMA TYPE: Chronic | ICD-10-CM

## 2020-11-02 ENCOUNTER — PATIENT MESSAGE (OUTPATIENT)
Dept: PRIMARY CARE CLINIC | Facility: CLINIC | Age: 85
End: 2020-11-02

## 2020-11-02 ENCOUNTER — PATIENT MESSAGE (OUTPATIENT)
Dept: INTERNAL MEDICINE | Facility: CLINIC | Age: 85
End: 2020-11-02

## 2020-11-02 RX ORDER — CLONAZEPAM 0.5 MG/1
0.5 TABLET ORAL 3 TIMES DAILY
Qty: 90 TABLET | Refills: 1 | Status: SHIPPED | OUTPATIENT
Start: 2020-11-02 | End: 2021-11-02

## 2020-11-10 ENCOUNTER — TELEPHONE (OUTPATIENT)
Dept: INTERNAL MEDICINE | Facility: CLINIC | Age: 85
End: 2020-11-10

## 2020-11-10 ENCOUNTER — TELEPHONE (OUTPATIENT)
Dept: PRIMARY CARE CLINIC | Facility: CLINIC | Age: 85
End: 2020-11-10

## 2020-11-10 NOTE — TELEPHONE ENCOUNTER
----- Message from Molly Carroll sent at 11/10/2020 10:31 AM CST -----  Type:  Needs Medical Advice    Who Called:   Georgie with [facility]   Symptoms (please be specific): Dr Delgado was pt's pcp and being his is leaving she has chosen Dr Hamm as her PCP//pt  has an appt with Dr Hamm on 12-17-20///she is admitted to [facility] right now and should be discharged tomorrow//pt had a fall and has a fractured Clavicular and Humerus//is calling to let Dr Hamm know because pt will need  to follow her//pt has [employer]   How long has patient had these symptoms:     Pharmacy name and phone #:     Would the patient rather a call back or a response via [facility]?   Call back   Best Call Back Number:    [phone] or office [phone]  Additional Information:  please call///[email or web address]

## 2020-11-10 NOTE — TELEPHONE ENCOUNTER
I cannot write orders for patient even though she is a Dr Delgado's patient, I have never seen her before. She can establish with another PCP with sooner availability if needing newer orders. She can establish with any at the [place] location instead of me if needed.

## 2020-11-10 NOTE — TELEPHONE ENCOUNTER
----- Message from Madison Koenig sent at 11/10/2020 12:54 PM CST -----  Contact: Arielle/[facility]  Arielle is calling to get orders for Home Health please call her at [phone] for a verbal okay.    Thanks  Td

## 2020-11-11 NOTE — PROGRESS NOTES
Digital Medicine: Health  Follow-Up    The history is provided by caregiver. The patient has granted authorization to speak to this person.             Reason for review: Blood pressure not at goal        Topics Covered on Call: fall resulting in hospitalization/new responsible provider    Additional Follow-up details: Mrs. Mao reports that her mom fell on Monday 11/9/20 and broke her collar bone and may have broke her wrist. She is hospitalized, and Mrs. Mao requests a two week hiatus.     She explained that her mom will have home health care for a period of time following her discharge from the hospital.     She explained that Dr. Hamm will be Ms. Rivera's new primary care physician. I changed her hypertension responsible provider information on her care teams.             Diet-Not assessed          Physical Activity-Not assessed    Medication Adherence-Medication Adherence not addressed.      Substance, Sleep, Stress-Not assessed              Topic    Lipid (Cholesterol) Test          Last 5 Patient Entered Readings                                      Current 30 Day Average: 145/75     Recent Readings 11/5/2020 10/28/2020 10/22/2020 10/21/2020 10/21/2020    SBP (mmHg) 152 130 134 173 168    DBP (mmHg) 68 62 61 87 93    Pulse 69 71 67 62 69

## 2020-11-11 NOTE — TELEPHONE ENCOUNTER
Informed glenna that Dr Delgado cannot write home health orders, because he is leaving [facility] in a few days. I also informed her that the pt has chosen Josefina Hamm as her new PCP, but she will need an appt first before getting home health orders. She verbalized understanding.

## 2020-11-12 RX ORDER — CITALOPRAM 40 MG/1
TABLET, FILM COATED ORAL
Qty: 90 TABLET | Refills: 2 | Status: SHIPPED | OUTPATIENT
Start: 2020-11-12 | End: 2020-12-17

## 2020-11-12 NOTE — TELEPHONE ENCOUNTER
----- Message from Irma Crook sent at 11/12/2020  1:30 PM CST -----  Contact: [facility]  1. What is the name of the medication you are requesting? Citalotram  2. What is the dose? 40mg  3. How do you take the medication? Orally, topically, etc? N/a  4. How often do you take this medication? N/a  5. Do you need a 30 day or 90 day supply? N/a  6. How many refills are you requesting? N/a  7. What is your preferred pharmacy and location of the pharmacy? [facility]  8. Who can we contact with further questions? [facility] at  [phone]

## 2020-11-16 ENCOUNTER — LAB VISIT (OUTPATIENT)
Dept: LAB | Facility: HOSPITAL | Age: 85
End: 2020-11-16
Payer: MEDICARE

## 2020-11-16 DIAGNOSIS — R68.89 MECHANICAL AND MOTOR PROBLEMS WITH INTERNAL ORGANS: Primary | ICD-10-CM

## 2020-11-16 LAB
ERYTHROCYTE [DISTWIDTH] IN BLOOD BY AUTOMATED COUNT: 14.2 % (ref 11.5–14.5)
HCT VFR BLD AUTO: 24.7 % (ref 37–48.5)
HGB BLD-MCNC: 7.7 G/DL (ref 12–16)
MCH RBC QN AUTO: 31.7 PG (ref 27–31)
MCHC RBC AUTO-ENTMCNC: 31.2 G/DL (ref 32–36)
MCV RBC AUTO: 102 FL (ref 82–98)
PLATELET # BLD AUTO: 190 K/UL (ref 150–350)
PMV BLD AUTO: 12.7 FL (ref 9.2–12.9)
RBC # BLD AUTO: 2.43 M/UL (ref 4–5.4)
WBC # BLD AUTO: 7.17 K/UL (ref 3.9–12.7)

## 2020-11-16 PROCEDURE — 85027 COMPLETE CBC AUTOMATED: CPT | Mod: HCNC

## 2020-11-17 ENCOUNTER — PATIENT MESSAGE (OUTPATIENT)
Dept: PRIMARY CARE CLINIC | Facility: CLINIC | Age: 85
End: 2020-11-17

## 2020-11-22 DIAGNOSIS — D64.9 ANEMIA, UNSPECIFIED TYPE: Primary | ICD-10-CM

## 2020-11-23 ENCOUNTER — TELEPHONE (OUTPATIENT)
Dept: INTERNAL MEDICINE | Facility: CLINIC | Age: 85
End: 2020-11-23

## 2020-11-23 NOTE — TELEPHONE ENCOUNTER
Spoke w/ pt's daughter, informed her of the need for Ms. Rivera to have her blood redrawn and an appt with a provider. She stated that today was not a good day because HH was coming and she has a F/U w/ the surgeon for her broken collarbone. I informed the daughter I would relay this information to Dr. Santos and return her call.          Phoned pt's daughter, no answer and unable to leave voicemail/jj

## 2020-11-24 ENCOUNTER — OFFICE VISIT (OUTPATIENT)
Dept: INTERNAL MEDICINE | Facility: CLINIC | Age: 85
End: 2020-11-24
Payer: MEDICARE

## 2020-11-24 ENCOUNTER — HOSPITAL ENCOUNTER (OUTPATIENT)
Dept: RADIOLOGY | Facility: HOSPITAL | Age: 85
Discharge: HOME | End: 2020-11-24
Attending: PHYSICIAN ASSISTANT
Payer: MEDICARE

## 2020-11-24 VITALS
BODY MASS INDEX: 14.07 KG/M2 | WEIGHT: 79.38 LBS | OXYGEN SATURATION: 93 % | TEMPERATURE: 100 F | HEART RATE: 77 BPM | HEIGHT: 63 IN

## 2020-11-24 DIAGNOSIS — I10 ESSENTIAL HYPERTENSION: Chronic | ICD-10-CM

## 2020-11-24 DIAGNOSIS — J43.9 PULMONARY EMPHYSEMA, UNSPECIFIED EMPHYSEMA TYPE: Chronic | ICD-10-CM

## 2020-11-24 DIAGNOSIS — D64.9 ANEMIA, UNSPECIFIED TYPE: ICD-10-CM

## 2020-11-24 DIAGNOSIS — R06.02 SHORTNESS OF BREATH: ICD-10-CM

## 2020-11-24 DIAGNOSIS — R64 CACHEXIA: ICD-10-CM

## 2020-11-24 DIAGNOSIS — R06.02 SHORTNESS OF BREATH: Primary | ICD-10-CM

## 2020-11-24 DIAGNOSIS — D62 ACUTE BLOOD LOSS ANEMIA: ICD-10-CM

## 2020-11-24 PROCEDURE — 1159F PR MEDICATION LIST DOCUMENTED IN MEDICAL RECORD: ICD-10-PCS | Mod: HCNC,S$GLB,, | Performed by: PHYSICIAN ASSISTANT

## 2020-11-24 PROCEDURE — 71046 X-RAY EXAM CHEST 2 VIEWS: CPT | Mod: TC,HCNC

## 2020-11-24 PROCEDURE — 1159F MED LIST DOCD IN RCRD: CPT | Mod: HCNC,S$GLB,, | Performed by: PHYSICIAN ASSISTANT

## 2020-11-24 PROCEDURE — 99214 PR OFFICE/OUTPT VISIT, EST, LEVL IV, 30-39 MIN: ICD-10-PCS | Mod: HCNC,S$GLB,, | Performed by: PHYSICIAN ASSISTANT

## 2020-11-24 PROCEDURE — 1126F AMNT PAIN NOTED NONE PRSNT: CPT | Mod: HCNC,S$GLB,, | Performed by: PHYSICIAN ASSISTANT

## 2020-11-24 PROCEDURE — 3288F FALL RISK ASSESSMENT DOCD: CPT | Mod: HCNC,CPTII,S$GLB, | Performed by: PHYSICIAN ASSISTANT

## 2020-11-24 PROCEDURE — 71046 X-RAY EXAM CHEST 2 VIEWS: CPT | Mod: 26,HCNC,, | Performed by: RADIOLOGY

## 2020-11-24 PROCEDURE — 1100F PTFALLS ASSESS-DOCD GE2>/YR: CPT | Mod: HCNC,CPTII,S$GLB, | Performed by: PHYSICIAN ASSISTANT

## 2020-11-24 PROCEDURE — 1126F PR PAIN SEVERITY QUANTIFIED, NO PAIN PRESENT: ICD-10-PCS | Mod: HCNC,S$GLB,, | Performed by: PHYSICIAN ASSISTANT

## 2020-11-24 PROCEDURE — 99999 PR PBB SHADOW E&M-EST. PATIENT-LVL V: CPT | Mod: PBBFAC,HCNC,, | Performed by: PHYSICIAN ASSISTANT

## 2020-11-24 PROCEDURE — 3288F PR FALLS RISK ASSESSMENT DOCUMENTED: ICD-10-PCS | Mod: HCNC,CPTII,S$GLB, | Performed by: PHYSICIAN ASSISTANT

## 2020-11-24 PROCEDURE — 99999 PR PBB SHADOW E&M-EST. PATIENT-LVL V: ICD-10-PCS | Mod: PBBFAC,HCNC,, | Performed by: PHYSICIAN ASSISTANT

## 2020-11-24 PROCEDURE — 99214 OFFICE O/P EST MOD 30 MIN: CPT | Mod: HCNC,S$GLB,, | Performed by: PHYSICIAN ASSISTANT

## 2020-11-24 PROCEDURE — 71046 XR CHEST PA AND LATERAL: ICD-10-PCS | Mod: 26,HCNC,, | Performed by: RADIOLOGY

## 2020-11-24 PROCEDURE — 1100F PR PT FALLS ASSESS DOC 2+ FALLS/FALL W/INJURY/YR: ICD-10-PCS | Mod: HCNC,CPTII,S$GLB, | Performed by: PHYSICIAN ASSISTANT

## 2020-11-24 NOTE — PROGRESS NOTES
Subjective:      Patient ID: Anju Rivera is a 89 y.o. female.    Chief Complaint: Labs Only    HPI  Here today to have blood work completed after a traumatic fall 11/9. She is still reporting some weakness and shortness of breath. Her recent labs indicated some anemia but she did have a significant amount of blood loss from the fall. Denies any blood in the stool/dark tary stools. Denies any bruising. No obvious evidence of bleeding. Her wounds have stopped bleeding as well.   She would like a blood count and iron studies completed.   She does report improvement since her fall.     Patient Active Problem List   Diagnosis    Coronary artery disease    Chronic diastolic heart failure    Mitral regurgitation    COPD (chronic obstructive pulmonary disease)    Hypertension    Aortic valve disorder    Pulmonary hypertension    Mixed hyperlipidemia    COAG (chronic open-angle glaucoma) - Both Eyes    HX of MI    Hypothyroidism    Peripheral vascular disease    Amputation of left great toe    Anxiety    Shortness of breath    PVC's (premature ventricular contractions)    Proteinuria    Chronic kidney disease, stage III (moderate)    History of breast cancer    History of skin cancer    Major depression    Carotid artery disease    Tortuous aorta    Pain    Primary open angle glaucoma of both eyes, severe stage    Pseudophakia of both eyes    Refractive error    Primary osteoarthritis of right shoulder    Osteoporosis    Bilateral foot pain    Chronic right shoulder pain    Nonrheumatic aortic valve stenosis    Abnormal ECG    Cachexia       Current Outpatient Medications:     albuterol (VENTOLIN HFA) 90 mcg/actuation inhaler, Inhale 2 puffs into the lungs every 4 (four) hours as needed for Wheezing., Disp: 18 g, Rfl: 3    albuterol-ipratropium (DUO-NEB) 2.5 mg-0.5 mg/3 mL nebulizer solution, USE 1 VIAL VIA NEBULIZER EVERY 6 TO 8 HOURS AS NEEDED FOR WHEEZING, Disp: 360 mL, Rfl:  6    allopurinoL (ZYLOPRIM) 100 MG tablet, Take 2 tablets (200 mg total) by mouth once daily., Disp: 180 tablet, Rfl: 1    aspirin (ECOTRIN) 81 MG EC tablet, Take 81 mg by mouth once daily., Disp: , Rfl:     bimatoprost (LUMIGAN) 0.03 % ophthalmic drops, Place 1 drop into both eyes nightly., Disp: 2.5 mL, Rfl: 12    bisacodyl (DULCOLAX) 5 mg EC tablet, Take 5 mg by mouth daily as needed for Constipation., Disp: , Rfl:     CARBOXYMETHYLCELLULOSE SODIUM (REFRESH OPHT), Apply 1 drop to eye daily as needed. , Disp: , Rfl:     citalopram (CELEXA) 40 MG tablet, TAKE ONE TABLET BY MOUTH ONE TIME DAILY, Disp: 90 tablet, Rfl: 2    clonazePAM (KLONOPIN) 0.5 MG tablet, Take 1 tablet (0.5 mg total) by mouth 3 (three) times daily., Disp: 90 tablet, Rfl: 1    facial-body wipes (CLEANSING EYELID WIPES TOP), , Disp: , Rfl:     ferrous sulfate (IRON, FERROUS SULFATE,) 325 mg (65 mg iron) Tab tablet, , Disp: , Rfl:     fluticasone furoate-vilanterol (BREO ELLIPTA) 100-25 mcg/dose diskus inhaler, Inhale 1 puff into the lungs once daily., Disp: 1 each, Rfl: 11    HYDROcodone-acetaminophen (NORCO) 5-325 mg per tablet, Take 1/2 to 1 tablet one time during the the day as needed and 1 1/2 tablet at night, Disp: 75 tablet, Rfl: 0    latanoprost 0.005 % ophthalmic solution, Place 1 drop into both eyes every evening., Disp: 2.5 mL, Rfl: 12    latanoprostene bunod (VYZULTA) 0.024 % Drop, Apply 1 drop to eye every evening., Disp: 2.5 mL, Rfl: 12    losartan (COZAAR) 50 MG tablet, TAKE ONE TABLET BY MOUTH ONCE A DAY, Disp: 90 tablet, Rfl: 0    LUMIGAN 0.01 % Drop, Place 1 drop into both eyes every evening., Disp: 2.5 mL, Rfl: 11    MULTIVITAMIN, Take 1 tablet by mouth Daily., Disp: , Rfl:     OXYGEN-AIR DELIVERY SYSTEMS MISC, 2 L by Misc.(Non-Drug; Combo Route) route. Per min, Disp: , Rfl:     psyllium (METAMUCIL) powder, Take 1 packet by mouth once daily., Disp: , Rfl:     UNKNOWN TO PATIENT, Cough syrup as needed, Disp: ,  "Rfl:     levothyroxine (SYNTHROID) 50 MCG tablet, Take 1 tablet (50 mcg total) by mouth once daily., Disp: 90 tablet, Rfl: 1    Review of Systems   Constitutional: Negative for activity change, appetite change, chills, diaphoresis, fatigue, fever and unexpected weight change.   HENT: Negative.  Negative for congestion, hearing loss, postnasal drip, rhinorrhea, sore throat, trouble swallowing and voice change.    Eyes: Negative.  Negative for visual disturbance.   Respiratory: Positive for shortness of breath. Negative for cough, choking, chest tightness, wheezing and stridor.    Cardiovascular: Negative for chest pain, palpitations and leg swelling.   Gastrointestinal: Negative for abdominal distention, abdominal pain, blood in stool, constipation, diarrhea, nausea and vomiting.   Endocrine: Negative for cold intolerance, heat intolerance, polydipsia and polyuria.   Genitourinary: Negative.  Negative for difficulty urinating and frequency.   Musculoskeletal: Negative for arthralgias, back pain, gait problem, joint swelling and myalgias.   Skin: Negative for color change, pallor, rash and wound.   Neurological: Negative for dizziness, tremors, weakness, light-headedness, numbness and headaches.   Hematological: Negative for adenopathy.   Psychiatric/Behavioral: Negative for behavioral problems, confusion, self-injury, sleep disturbance and suicidal ideas. The patient is not nervous/anxious.      Objective:   Pulse 77   Temp 100 °F (37.8 °C) (Tympanic)   Ht 5' 3" (1.6 m)   Wt 36 kg (79 lb 5.9 oz)   SpO2 (!) 93%   BMI 14.06 kg/m²     Physical Exam  Vitals signs and nursing note reviewed.   Constitutional:       General: She is not in acute distress.     Appearance: Normal appearance. She is well-developed. She is not ill-appearing, toxic-appearing or diaphoretic.   HENT:      Head: Normocephalic and atraumatic.   Cardiovascular:      Rate and Rhythm: Normal rate and regular rhythm.      Heart sounds: Normal heart " sounds. No murmur. No friction rub. No gallop.    Pulmonary:      Effort: Pulmonary effort is normal. No respiratory distress.      Breath sounds: Normal breath sounds. No wheezing or rales.   Musculoskeletal: Normal range of motion.   Skin:     General: Skin is warm.      Capillary Refill: Capillary refill takes less than 2 seconds.      Findings: No rash.   Neurological:      General: No focal deficit present.      Mental Status: She is alert and oriented to person, place, and time.   Psychiatric:         Mood and Affect: Mood normal.         Behavior: Behavior normal.         Thought Content: Thought content normal.         Judgment: Judgment normal.       X-Ray Chest PA And Lateral  Narrative: EXAMINATION:  XR CHEST PA AND LATERAL    CLINICAL HISTORY:  Shortness of breath    TECHNIQUE:  PA and lateral views of the chest were performed.    COMPARISON:  January 27, 2012    FINDINGS:  Interval increased blunting and indistinctness of the CP angles and diaphragms consisting with increasing effusions.  Diffuse prominence interstitial markings throughout both lungs.  There is lucency and air-fluid levels projecting through the cardiac silhouette consistent with hiatal or paraesophageal hernia.  Posterior layering effusions noted.  No pneumothorax.  Postsurgical changes right axilla.  Healed fractures bilateral humeri.  Extensive degenerative change right shoulder.  Generalized osteopenia and accentuated kyphosis with poorly visualized T-spine.  Multi level insufficiency fractures excluded.  Tortuous aorta with atherosclerotic calcification  Impression: Interval worsening increased bilateral effusions and visualization of large hiatal or paraesophageal hernia.    Scattered fibrotic changes bilaterally and diffuse prominence interstitial markings.    Postsurgical changes as above.    Generalized osteopenia and multiple healed fractures noted involving the extremities as detailed.    Electronically signed by: Christopher  MD Dre  Date:    11/24/2020  Time:    15:07    Lab Visit on 11/24/2020   Component Date Value Ref Range Status    WBC 11/24/2020 5.93  3.90 - 12.70 K/uL Final    RBC 11/24/2020 2.37* 4.00 - 5.40 M/uL Final    Hemoglobin 11/24/2020 7.5* 12.0 - 16.0 g/dL Final    Hematocrit 11/24/2020 25.1* 37.0 - 48.5 % Final    MCV 11/24/2020 106* 82 - 98 fL Final    MCH 11/24/2020 31.6* 27.0 - 31.0 pg Final    MCHC 11/24/2020 29.9* 32.0 - 36.0 g/dL Final    RDW 11/24/2020 16.8* 11.5 - 14.5 % Final    Platelets 11/24/2020 294  150 - 350 K/uL Final    MPV 11/24/2020 11.6  9.2 - 12.9 fL Final    Immature Granulocytes 11/24/2020 0.3  0.0 - 0.5 % Final    Gran # (ANC) 11/24/2020 4.4  1.8 - 7.7 K/uL Final    Immature Grans (Abs) 11/24/2020 0.02  0.00 - 0.04 K/uL Final    Comment: Mild elevation in immature granulocytes is non specific and   can be seen in a variety of conditions including stress response,   acute inflammation, trauma and pregnancy. Correlation with other   laboratory and clinical findings is essential.      Lymph # 11/24/2020 1.0  1.0 - 4.8 K/uL Final    Mono # 11/24/2020 0.5  0.3 - 1.0 K/uL Final    Eos # 11/24/2020 0.1  0.0 - 0.5 K/uL Final    Baso # 11/24/2020 0.02  0.00 - 0.20 K/uL Final    nRBC 11/24/2020 0  0 /100 WBC Final    Gran % 11/24/2020 73.6* 38.0 - 73.0 % Final    Lymph % 11/24/2020 16.0* 18.0 - 48.0 % Final    Mono % 11/24/2020 8.6  4.0 - 15.0 % Final    Eosinophil % 11/24/2020 1.2  0.0 - 8.0 % Final    Basophil % 11/24/2020 0.3  0.0 - 1.9 % Final    Differential Method 11/24/2020 Automated   Final    Ferritin 11/24/2020 65  20.0 - 300.0 ng/mL Final    Iron 11/24/2020 77  30 - 160 ug/dL Final    Transferrin 11/24/2020 198* 200 - 375 mg/dL Final    TIBC 11/24/2020 293  250 - 450 ug/dL Final    Saturated Iron 11/24/2020 26  20 - 50 % Final       Assessment:     1. Shortness of breath    2. Pulmonary emphysema, unspecified emphysema type    3. Essential hypertension    4.  Acute blood loss anemia    5. Cachexia    6. Anemia, unspecified type      Plan:   Shortness of breath  -     X-Ray Chest PA And Lateral; Future; Expected date: 11/24/2020    Pulmonary emphysema, unspecified emphysema type    Essential hypertension    Acute blood loss anemia  -     Ambulatory referral/consult to Hematology / Oncology; Future; Expected date: 12/04/2020    Cachexia    Anemia, unspecified type  -     Ambulatory referral/consult to Gastroenterology    -cbc and iron studies today  -follow up with pulm. Pleural effusions noted on cxr. Advised to go to the ER if shortness of breath/weakness worsens.   -if worsening anemia noted with set up apt with gi and hematology    Follow up if symptoms worsen or fail to improve.

## 2020-11-24 NOTE — TELEPHONE ENCOUNTER
Spoke w/ pt's DYLLAN, informed him of the need for the pt to have a cbc redrawn and a visit with a provider. Appt scheduled w/ OBDULIA Tracy today @ 1:20 PM. DYLLAN verbalized understanding/sanchez

## 2020-11-25 ENCOUNTER — PATIENT MESSAGE (OUTPATIENT)
Dept: INTERNAL MEDICINE | Facility: CLINIC | Age: 85
End: 2020-11-25

## 2020-11-25 DIAGNOSIS — J90 PLEURAL EFFUSION, BILATERAL: Primary | ICD-10-CM

## 2020-11-27 ENCOUNTER — TELEPHONE (OUTPATIENT)
Dept: INTERNAL MEDICINE | Facility: CLINIC | Age: 85
End: 2020-11-27

## 2020-11-27 ENCOUNTER — OFFICE VISIT (OUTPATIENT)
Dept: INTERNAL MEDICINE | Facility: CLINIC | Age: 85
End: 2020-11-27
Payer: MEDICARE

## 2020-11-27 DIAGNOSIS — I50.9 ACUTE HEART FAILURE, UNSPECIFIED HEART FAILURE TYPE: Primary | ICD-10-CM

## 2020-11-27 DIAGNOSIS — D64.9 ACUTE ANEMIA: ICD-10-CM

## 2020-11-27 DIAGNOSIS — I50.32 CHRONIC DIASTOLIC HEART FAILURE: Chronic | ICD-10-CM

## 2020-11-27 DIAGNOSIS — J90 BILATERAL PLEURAL EFFUSION: ICD-10-CM

## 2020-11-27 PROCEDURE — 1159F PR MEDICATION LIST DOCUMENTED IN MEDICAL RECORD: ICD-10-PCS | Mod: HCNC,95,, | Performed by: PHYSICIAN ASSISTANT

## 2020-11-27 PROCEDURE — 1159F MED LIST DOCD IN RCRD: CPT | Mod: HCNC,95,, | Performed by: PHYSICIAN ASSISTANT

## 2020-11-27 PROCEDURE — 99213 OFFICE O/P EST LOW 20 MIN: CPT | Mod: HCNC,95,, | Performed by: PHYSICIAN ASSISTANT

## 2020-11-27 PROCEDURE — 99213 PR OFFICE/OUTPT VISIT, EST, LEVL III, 20-29 MIN: ICD-10-PCS | Mod: HCNC,95,, | Performed by: PHYSICIAN ASSISTANT

## 2020-11-27 RX ORDER — FUROSEMIDE 20 MG/1
20 TABLET ORAL DAILY
Qty: 4 TABLET | Refills: 0 | Status: SHIPPED | OUTPATIENT
Start: 2020-11-27 | End: 2020-12-10 | Stop reason: SDUPTHER

## 2020-11-27 RX ORDER — POTASSIUM CHLORIDE 750 MG/1
10 TABLET, EXTENDED RELEASE ORAL DAILY
Qty: 4 TABLET | Refills: 0 | Status: SHIPPED | OUTPATIENT
Start: 2020-11-27 | End: 2020-12-01

## 2020-11-27 NOTE — TELEPHONE ENCOUNTER
Informed pt's daughter about the 2 appt's w/ hematology and GI/jj    Daughter verbalized understanding/jj

## 2020-11-27 NOTE — PROGRESS NOTES
Audio Only Telehealth Visit     The patient location is: home in [place], [place]  The chief complaint leading to consultation is: follow up-review labs/cxr  Visit type: Virtual visit with audio only (telephone)  Total time spent with patient: 13 minutes     The reason for the audio only service rather than synchronous audio and video virtual visit was related to technical difficulties or patient preference/necessity.     Each patient to whom I provide medical services by telemedicine is:  (1) informed of the relationship between the physician and patient and the respective role of any other health care provider with respect to management of the patient; and (2) notified that they may decline to receive medical services by telemedicine and may withdraw from such care at any time. Patient verbally consented to receive this service via voice-only telephone call.      Patient ID: Anju Rivera is a 89 y.o. female.    Chief Complaint: No chief complaint on file.      Ms. Rivera has this appointment today to recheck her shortness of breath/weakness and review her labs.   Admits to feeling weak and still having a nonproductive cough. No fever. She also has lower extremity swelling. Chest feels tight with coughing but not painful. She has taken lasix in the past but is not currently on any lasix.     Shortness of Breath  This is a chronic problem. The current episode started more than 1 year ago. The problem occurs daily. The problem has been gradually improving. Associated symptoms include sputum production. Pertinent negatives include no abdominal pain, chest pain, claudication, coryza, ear pain, fever, headaches, hemoptysis, leg pain, leg swelling, neck pain, orthopnea, PND, rash, rhinorrhea, sore throat, swollen glands, syncope, vomiting or wheezing. The symptoms are aggravated by emotional upset and any activity. The patient has no known risk factors for DVT/PE. She has tried OTC cough suppressants, beta agonist  inhalers and ipratropium inhalers for the symptoms. The treatment provided mild relief. Her past medical history is significant for CAD, COPD and pneumonia. There is no history of allergies, aspirin allergies, asthma, bronchiolitis, chronic lung disease, DVT, a heart failure, PE or a recent surgery.       Review of Systems   Constitutional: Positive for fatigue. Negative for activity change, appetite change, chills, diaphoresis, fever and unexpected weight change.   HENT: Negative.  Negative for congestion, ear pain, hearing loss, postnasal drip, rhinorrhea, sore throat, trouble swallowing and voice change.    Eyes: Negative.  Negative for visual disturbance.   Respiratory: Positive for sputum production, chest tightness and shortness of breath. Negative for cough, hemoptysis, choking and wheezing.    Cardiovascular: Negative for chest pain, palpitations, orthopnea, claudication, leg swelling, syncope and PND.   Gastrointestinal: Negative for abdominal distention, abdominal pain, blood in stool, constipation, diarrhea, nausea and vomiting.   Endocrine: Negative for cold intolerance, heat intolerance, polydipsia and polyuria.   Genitourinary: Negative.  Negative for difficulty urinating and frequency.   Musculoskeletal: Negative for arthralgias, back pain, gait problem, joint swelling, myalgias and neck pain.   Skin: Negative for color change, pallor, rash and wound.   Neurological: Positive for weakness. Negative for dizziness, tremors, light-headedness, numbness and headaches.   Hematological: Negative for adenopathy.   Psychiatric/Behavioral: Negative for behavioral problems, confusion, self-injury, sleep disturbance and suicidal ideas. The patient is not nervous/anxious.      Objective:   There were no vitals taken for this visit.    Physical Exam  Constitutional:       General: She is not in acute distress.     Appearance: Normal appearance. She is not ill-appearing, toxic-appearing or diaphoretic.   Pulmonary:       Effort: Pulmonary effort is normal. No respiratory distress.   Neurological:      Mental Status: She is alert.      Motor: Weakness present.   Psychiatric:         Mood and Affect: Mood normal.         Behavior: Behavior normal.         Thought Content: Thought content normal.         Judgment: Judgment normal.         Assessment:     1. Acute heart failure, unspecified heart failure type    2. Chronic diastolic heart failure    3. Bilateral pleural effusion    4. Acute anemia      Plan:   Acute heart failure, unspecified heart failure type  -     furosemide (LASIX) 20 MG tablet; Take 1 tablet (20 mg total) by mouth once daily. for 4 days  Dispense: 4 tablet; Refill: 0  -     potassium chloride SA (K-DUR,KLOR-CON) 10 MEQ tablet; Take 1 tablet (10 mEq total) by mouth once daily. for 4 days  Dispense: 4 tablet; Refill: 0  Chronic diastolic heart failure    Bilateral pleural effusion  -scheduled to see pulm on Tuesday.    Acute anemia    -see GI and hematology      -advised her to go to the ER if symptoms worsen.   -red flags discussed in detail     Follow up if symptoms worsen or fail to improve.    This service was not originating from a related E/M service provided within the previous 7 days nor will  to an E/M service or procedure within the next 24 hours or my soonest available appointment.  Prevailing standard of care was able to be met in this audio-only visit.

## 2020-11-30 ENCOUNTER — PATIENT OUTREACH (OUTPATIENT)
Dept: ADMINISTRATIVE | Facility: OTHER | Age: 85
End: 2020-11-30

## 2020-11-30 DIAGNOSIS — D64.9 ANEMIA, UNSPECIFIED TYPE: Primary | ICD-10-CM

## 2020-12-01 ENCOUNTER — DOCUMENT SCAN (OUTPATIENT)
Dept: HOME HEALTH SERVICES | Facility: HOSPITAL | Age: 85
End: 2020-12-01
Payer: MEDICARE

## 2020-12-01 ENCOUNTER — OFFICE VISIT (OUTPATIENT)
Dept: SLEEP MEDICINE | Facility: CLINIC | Age: 85
End: 2020-12-01
Payer: MEDICARE

## 2020-12-01 ENCOUNTER — HOSPITAL ENCOUNTER (OUTPATIENT)
Dept: RADIOLOGY | Facility: HOSPITAL | Age: 85
Discharge: HOME | End: 2020-12-01
Attending: NURSE PRACTITIONER
Payer: MEDICARE

## 2020-12-01 VITALS
OXYGEN SATURATION: 94 % | WEIGHT: 84 LBS | HEART RATE: 75 BPM | SYSTOLIC BLOOD PRESSURE: 160 MMHG | RESPIRATION RATE: 14 BRPM | DIASTOLIC BLOOD PRESSURE: 80 MMHG | HEIGHT: 63 IN | BODY MASS INDEX: 14.88 KG/M2

## 2020-12-01 DIAGNOSIS — J43.9 PULMONARY EMPHYSEMA, UNSPECIFIED EMPHYSEMA TYPE: ICD-10-CM

## 2020-12-01 DIAGNOSIS — I50.32 CHRONIC DIASTOLIC HEART FAILURE: ICD-10-CM

## 2020-12-01 DIAGNOSIS — J20.9 ACUTE BRONCHITIS, UNSPECIFIED ORGANISM: ICD-10-CM

## 2020-12-01 DIAGNOSIS — J90 BILATERAL PLEURAL EFFUSION: ICD-10-CM

## 2020-12-01 DIAGNOSIS — J43.9 PULMONARY EMPHYSEMA, UNSPECIFIED EMPHYSEMA TYPE: Primary | ICD-10-CM

## 2020-12-01 DIAGNOSIS — D64.9 ANEMIA, UNSPECIFIED TYPE: ICD-10-CM

## 2020-12-01 PROCEDURE — 1100F PTFALLS ASSESS-DOCD GE2>/YR: CPT | Mod: HCNC,CPTII,S$GLB, | Performed by: NURSE PRACTITIONER

## 2020-12-01 PROCEDURE — 1159F PR MEDICATION LIST DOCUMENTED IN MEDICAL RECORD: ICD-10-PCS | Mod: HCNC,S$GLB,, | Performed by: NURSE PRACTITIONER

## 2020-12-01 PROCEDURE — 99999 PR PBB SHADOW E&M-EST. PATIENT-LVL V: ICD-10-PCS | Mod: PBBFAC,HCNC,, | Performed by: NURSE PRACTITIONER

## 2020-12-01 PROCEDURE — 3288F FALL RISK ASSESSMENT DOCD: CPT | Mod: HCNC,CPTII,S$GLB, | Performed by: NURSE PRACTITIONER

## 2020-12-01 PROCEDURE — 3288F PR FALLS RISK ASSESSMENT DOCUMENTED: ICD-10-PCS | Mod: HCNC,CPTII,S$GLB, | Performed by: NURSE PRACTITIONER

## 2020-12-01 PROCEDURE — 71046 X-RAY EXAM CHEST 2 VIEWS: CPT | Mod: 26,HCNC,, | Performed by: RADIOLOGY

## 2020-12-01 PROCEDURE — 71046 XR CHEST PA AND LATERAL: ICD-10-PCS | Mod: 26,HCNC,, | Performed by: RADIOLOGY

## 2020-12-01 PROCEDURE — 1100F PR PT FALLS ASSESS DOC 2+ FALLS/FALL W/INJURY/YR: ICD-10-PCS | Mod: HCNC,CPTII,S$GLB, | Performed by: NURSE PRACTITIONER

## 2020-12-01 PROCEDURE — 99214 PR OFFICE/OUTPT VISIT, EST, LEVL IV, 30-39 MIN: ICD-10-PCS | Mod: HCNC,S$GLB,, | Performed by: NURSE PRACTITIONER

## 2020-12-01 PROCEDURE — 1159F MED LIST DOCD IN RCRD: CPT | Mod: HCNC,S$GLB,, | Performed by: NURSE PRACTITIONER

## 2020-12-01 PROCEDURE — 99999 PR PBB SHADOW E&M-EST. PATIENT-LVL V: CPT | Mod: PBBFAC,HCNC,, | Performed by: NURSE PRACTITIONER

## 2020-12-01 PROCEDURE — 99214 OFFICE O/P EST MOD 30 MIN: CPT | Mod: HCNC,S$GLB,, | Performed by: NURSE PRACTITIONER

## 2020-12-01 PROCEDURE — 71046 X-RAY EXAM CHEST 2 VIEWS: CPT | Mod: TC,HCNC

## 2020-12-01 RX ORDER — DOXYCYCLINE HYCLATE 100 MG
100 TABLET ORAL 2 TIMES DAILY
Qty: 28 TABLET | Refills: 0 | Status: SHIPPED | OUTPATIENT
Start: 2020-12-01 | End: 2020-12-15

## 2020-12-01 NOTE — PROGRESS NOTES
Health Maintenance Due   Topic Date Due    TETANUS VACCINE  07/21/1947    Sign Pain Contract  07/21/1947    Urine Drug Screen  07/21/1947    Naloxone Prescription  07/21/1947    Shingles Vaccine (2 of 3) 09/21/2017    DEXA SCAN  08/15/2019    Lipid Panel  04/29/2020     Updates were requested from care everywhere.  Chart was reviewed for overdue Proactive [facility] Encounters ([facility]) topics (CRS, Breast Cancer Screening, Eye exam)  Health Maintenance has been updated.  LINKS immunization registry triggered.  Immunizations were reconciled.

## 2020-12-01 NOTE — PROGRESS NOTES
"Subjective:      Patient ID: Anju Rivera is a 89 y.o. female.    Chief Complaint: COPD    HPI  Patient with end stage COPD presents to evaluate bilateral pleural effusions. SOB. Increased lower extremity swelling. Increased weight. Given a few days of lasix.   comorbidites include.   Anemia and has been referred to hem-oc.   They have been watching salt intake. Cardiologist is Dr. Trejo seen in September.   She has a cough with clear/yellow mucous.  Oxygen 2L N/C with oxygen saturation 94%. Benefits from use.    Patient Active Problem List   Diagnosis    Coronary artery disease    Chronic diastolic heart failure    Mitral regurgitation    COPD (chronic obstructive pulmonary disease)    Hypertension    Aortic valve disorder    Pulmonary hypertension    Mixed hyperlipidemia    COAG (chronic open-angle glaucoma) - Both Eyes    HX of MI    Hypothyroidism    Peripheral vascular disease    Amputation of left great toe    Anxiety    Shortness of breath    PVC's (premature ventricular contractions)    Proteinuria    Chronic kidney disease, stage III (moderate)    History of breast cancer    History of skin cancer    Major depression    Carotid artery disease    Tortuous aorta    Pain    Primary open angle glaucoma of both eyes, severe stage    Pseudophakia of both eyes    Refractive error    Primary osteoarthritis of right shoulder    Osteoporosis    Bilateral foot pain    Chronic right shoulder pain    Nonrheumatic aortic valve stenosis    Abnormal ECG    Cachexia    Acute anemia         BP (!) 160/80   Pulse 75   Resp 14   Ht 5' 3" (1.6 m)   Wt 38.1 kg (83 lb 15.9 oz)   SpO2 (!) 94%   BMI 14.88 kg/m²   Body mass index is 14.88 kg/m².    Review of Systems   Constitutional: Positive for fatigue.   Respiratory: Positive for cough and shortness of breath.    Cardiovascular: Positive for leg swelling.   Musculoskeletal: Positive for arthralgias and back pain.   All other systems " reviewed and are negative.    Objective:      Physical Exam  Constitutional:       Appearance: Normal appearance. She is cachectic.   HENT:      Head: Atraumatic.   Neck:      Musculoskeletal: Normal range of motion and neck supple.   Cardiovascular:      Rate and Rhythm: Normal rate and regular rhythm.   Pulmonary:      Breath sounds: Rhonchi present.   Neurological:      Mental Status: She is alert.       Personal Diagnostic Review      Results for orders placed during the hospital encounter of 11/24/20   X-Ray Chest PA And Lateral    Narrative EXAMINATION:  XR CHEST PA AND LATERAL    CLINICAL HISTORY:  Shortness of breath    TECHNIQUE:  PA and lateral views of the chest were performed.    COMPARISON:  January 27, 2012    FINDINGS:  Interval increased blunting and indistinctness of the CP angles and diaphragms consisting with increasing effusions.  Diffuse prominence interstitial markings throughout both lungs.  There is lucency and air-fluid levels projecting through the cardiac silhouette consistent with hiatal or paraesophageal hernia.  Posterior layering effusions noted.  No pneumothorax.  Postsurgical changes right axilla.  Healed fractures bilateral humeri.  Extensive degenerative change right shoulder.  Generalized osteopenia and accentuated kyphosis with poorly visualized T-spine.  Multi level insufficiency fractures excluded.  Tortuous aorta with atherosclerotic calcification      Impression Interval worsening increased bilateral effusions and visualization of large hiatal or paraesophageal hernia.    Scattered fibrotic changes bilaterally and diffuse prominence interstitial markings.    Postsurgical changes as above.    Generalized osteopenia and multiple healed fractures noted involving the extremities as detailed.      Electronically signed by: Christopher Erickson MD  Date:    11/24/2020  Time:    15:07         Assessment:       1. Pulmonary emphysema, unspecified emphysema type    2. Bilateral pleural  effusion    3. Chronic diastolic heart failure    4. Acute bronchitis, unspecified organism    5. Anemia, unspecified type        Outpatient Encounter Medications as of 12/1/2020   Medication Sig Dispense Refill    albuterol (VENTOLIN HFA) 90 mcg/actuation inhaler Inhale 2 puffs into the lungs every 4 (four) hours as needed for Wheezing. 18 g 3    albuterol-ipratropium (DUO-NEB) 2.5 mg-0.5 mg/3 mL nebulizer solution USE 1 VIAL VIA NEBULIZER EVERY 6 TO 8 HOURS AS NEEDED FOR WHEEZING 360 mL 6    allopurinoL (ZYLOPRIM) 100 MG tablet Take 2 tablets (200 mg total) by mouth once daily. 180 tablet 1    aspirin (ECOTRIN) 81 MG EC tablet Take 81 mg by mouth once daily.      bisacodyl (DULCOLAX) 5 mg EC tablet Take 5 mg by mouth daily as needed for Constipation.      CARBOXYMETHYLCELLULOSE SODIUM (REFRESH OPHT) Apply 1 drop to eye daily as needed.       citalopram (CELEXA) 40 MG tablet TAKE ONE TABLET BY MOUTH ONE TIME DAILY 90 tablet 2    clonazePAM (KLONOPIN) 0.5 MG tablet Take 1 tablet (0.5 mg total) by mouth 3 (three) times daily. 90 tablet 1    facial-body wipes (CLEANSING EYELID WIPES TOP)       ferrous sulfate (IRON, FERROUS SULFATE,) 325 mg (65 mg iron) Tab tablet       fluticasone furoate-vilanterol (BREO ELLIPTA) 100-25 mcg/dose diskus inhaler Inhale 1 puff into the lungs once daily. 1 each 11    furosemide (LASIX) 20 MG tablet Take 1 tablet (20 mg total) by mouth once daily. for 4 days 4 tablet 0    HYDROcodone-acetaminophen (NORCO) 5-325 mg per tablet Take 1/2 to 1 tablet one time during the the day as needed and 1 1/2 tablet at night 75 tablet 0    levothyroxine (SYNTHROID) 50 MCG tablet Take 1 tablet (50 mcg total) by mouth once daily. 90 tablet 1    losartan (COZAAR) 50 MG tablet TAKE ONE TABLET BY MOUTH ONCE A DAY 90 tablet 0    LUMIGAN 0.01 % Drop Place 1 drop into both eyes every evening. 2.5 mL 11    MULTIVITAMIN Take 1 tablet by mouth Daily.      OXYGEN-AIR DELIVERY SYSTEMS MISC 2 L by  Misc.(Non-Drug; Combo Route) route. Per min      potassium chloride SA (K-DUR,KLOR-CON) 10 MEQ tablet Take 1 tablet (10 mEq total) by mouth once daily. for 4 days 4 tablet 0    psyllium (METAMUCIL) powder Take 1 packet by mouth once daily.      bimatoprost (LUMIGAN) 0.03 % ophthalmic drops Place 1 drop into both eyes nightly. 2.5 mL 12    doxycycline (VIBRA-TABS) 100 MG tablet Take 1 tablet (100 mg total) by mouth 2 (two) times daily. for 14 days 28 tablet 0    UNKNOWN TO PATIENT Cough syrup as needed      [DISCONTINUED] latanoprost 0.005 % ophthalmic solution Place 1 drop into both eyes every evening. 2.5 mL 12    [DISCONTINUED] latanoprostene bunod (VYZULTA) 0.024 % Drop Apply 1 drop to eye every evening. 2.5 mL 12     No facility-administered encounter medications on file as of 12/1/2020.      Orders Placed This Encounter   Procedures    X-Ray Chest PA And Lateral     Standing Status:   Future     Standing Expiration Date:   12/1/2021     Order Specific Question:   May the Radiologist modify the order per protocol to meet the clinical needs of the patient?     Answer:   Yes    BNP     Standing Status:   Future     Standing Expiration Date:   1/30/2022     Plan:   Changes in CXR - bilateral pleural effusion, weigh gain and edema.  repeat CXR to review.   BNP     Problem List Items Addressed This Visit        Pulmonary    COPD (chronic obstructive pulmonary disease) - Primary (Chronic)    Overview     BREO, Albuterol, Duoneb.  2L oxygen and benefiting from use.         Relevant Orders    X-Ray Chest PA And Lateral       Cardiac/Vascular    Chronic diastolic heart failure (Chronic)    Overview     -2D echo 2/5/2013: CONCLUSIONS     1 - Normal left ventricular function (EF 55%).     2 - Diastolic dysfunction.     3 - Concentric remodeling.          Relevant Orders    X-Ray Chest PA And Lateral    BNP      Other Visit Diagnoses     Bilateral pleural effusion        Relevant Orders    X-Ray Chest PA And  Lateral    BNP    Acute bronchitis, unspecified organism        Relevant Medications    doxycycline (VIBRA-TABS) 100 MG tablet    Other Relevant Orders    X-Ray Chest PA And Lateral    Anemia, unspecified type

## 2020-12-07 ENCOUNTER — LAB VISIT (OUTPATIENT)
Dept: LAB | Facility: HOSPITAL | Age: 85
End: 2020-12-07
Attending: FAMILY MEDICINE
Payer: MEDICARE

## 2020-12-07 ENCOUNTER — OFFICE VISIT (OUTPATIENT)
Dept: OPHTHALMOLOGY | Facility: CLINIC | Age: 85
End: 2020-12-07
Payer: MEDICARE

## 2020-12-07 DIAGNOSIS — H04.129 DRY EYE: ICD-10-CM

## 2020-12-07 DIAGNOSIS — Z96.1 PSEUDOPHAKIA OF BOTH EYES: ICD-10-CM

## 2020-12-07 DIAGNOSIS — H40.1133 PRIMARY OPEN ANGLE GLAUCOMA OF BOTH EYES, SEVERE STAGE: Primary | ICD-10-CM

## 2020-12-07 DIAGNOSIS — H01.02A SQUAMOUS BLEPHARITIS OF UPPER AND LOWER EYELIDS OF BOTH EYES: ICD-10-CM

## 2020-12-07 DIAGNOSIS — D64.9 ANEMIA, UNSPECIFIED TYPE: ICD-10-CM

## 2020-12-07 DIAGNOSIS — H01.02B SQUAMOUS BLEPHARITIS OF UPPER AND LOWER EYELIDS OF BOTH EYES: ICD-10-CM

## 2020-12-07 LAB
BASOPHILS # BLD AUTO: 0.02 K/UL (ref 0–0.2)
BASOPHILS NFR BLD: 0.3 % (ref 0–1.9)
DIFFERENTIAL METHOD: ABNORMAL
EOSINOPHIL # BLD AUTO: 0.2 K/UL (ref 0–0.5)
EOSINOPHIL NFR BLD: 3.5 % (ref 0–8)
ERYTHROCYTE [DISTWIDTH] IN BLOOD BY AUTOMATED COUNT: 16.6 % (ref 11.5–14.5)
FOLATE SERPL-MCNC: 17.7 NG/ML (ref 4–24)
HCT VFR BLD AUTO: 31.6 % (ref 37–48.5)
HGB BLD-MCNC: 9.4 G/DL (ref 12–16)
IMM GRANULOCYTES # BLD AUTO: 0.02 K/UL (ref 0–0.04)
IMM GRANULOCYTES NFR BLD AUTO: 0.3 % (ref 0–0.5)
LYMPHOCYTES # BLD AUTO: 1.6 K/UL (ref 1–4.8)
LYMPHOCYTES NFR BLD: 26 % (ref 18–48)
MCH RBC QN AUTO: 32.6 PG (ref 27–31)
MCHC RBC AUTO-ENTMCNC: 29.7 G/DL (ref 32–36)
MCV RBC AUTO: 110 FL (ref 82–98)
MONOCYTES # BLD AUTO: 0.6 K/UL (ref 0.3–1)
MONOCYTES NFR BLD: 9.6 % (ref 4–15)
NEUTROPHILS # BLD AUTO: 3.6 K/UL (ref 1.8–7.7)
NEUTROPHILS NFR BLD: 60.3 % (ref 38–73)
NRBC BLD-RTO: 0 /100 WBC
PLATELET # BLD AUTO: 225 K/UL (ref 150–350)
PMV BLD AUTO: 11.1 FL (ref 9.2–12.9)
RBC # BLD AUTO: 2.88 M/UL (ref 4–5.4)
VIT B12 SERPL-MCNC: 1476 PG/ML (ref 210–950)
WBC # BLD AUTO: 6.03 K/UL (ref 3.9–12.7)

## 2020-12-07 PROCEDURE — 92012 INTRM OPH EXAM EST PATIENT: CPT | Mod: HCNC,S$GLB,, | Performed by: OPHTHALMOLOGY

## 2020-12-07 PROCEDURE — 85025 COMPLETE CBC W/AUTO DIFF WBC: CPT | Mod: HCNC

## 2020-12-07 PROCEDURE — 82607 VITAMIN B-12: CPT | Mod: GA,HCNC

## 2020-12-07 PROCEDURE — 82746 ASSAY OF FOLIC ACID SERUM: CPT | Mod: GA,HCNC

## 2020-12-07 PROCEDURE — 99999 PR PBB SHADOW E&M-EST. PATIENT-LVL III: ICD-10-PCS | Mod: PBBFAC,HCNC,, | Performed by: OPHTHALMOLOGY

## 2020-12-07 PROCEDURE — 99999 PR PBB SHADOW E&M-EST. PATIENT-LVL III: CPT | Mod: PBBFAC,HCNC,, | Performed by: OPHTHALMOLOGY

## 2020-12-07 PROCEDURE — 92012 PR EYE EXAM, EST PATIENT,INTERMED: ICD-10-PCS | Mod: HCNC,S$GLB,, | Performed by: OPHTHALMOLOGY

## 2020-12-07 PROCEDURE — 36415 COLL VENOUS BLD VENIPUNCTURE: CPT | Mod: HCNC

## 2020-12-07 NOTE — PROGRESS NOTES
SUBJECTIVE  Anju CARISSA Rivera is 89 y.o. female  Corrected distance visual acuity was 20/70 +1 in the right eye and 20/40 +2 in the left eye.   Chief Complaint   Patient presents with    Follow-up     3-4m iop rck          HPI     Follow-up      Additional comments: 3-4m iop rck              Comments     Pt co ou va dimmed a little x4mo.  Pt co sharp pain once tish while not often.  Pt compliant with both gtts ou.        Pt can get out of w/c     1. PCIOL OU  2. Severe Coag with enlarged cups (init 38/18)OD>OS Goal < 16  Vf progression OD 11/17 add Brimonidine  Brimonidine-D/C due to drowsiness,redness,irritation   Rhopressa QHS OU (not using due to redness)  Vyzulta too $  3. PVD OD  4. H/O narrow angles  5. Blepharitis  6 YUNG OU    Bimatoprost 0.03% QHS OU  Systane PRN OU          Last edited by Chevy Dawson on 12/7/2020 11:36 AM. (History)         Assessment /Plan :  1. Primary open angle glaucoma of both eyes, severe stage Doing well, IOP within acceptable range relative to target IOP and no evidence of progression. Continue current treatment. Reviewed importance of continued compliance with treatment and follow up.     2. Pseudophakia of both eyes -stable    3. Dry eye -well, continue current treatment.    4. Squamous blepharitis of upper and lower eyelids of both eyes -improved, continue current treatment     Return to clinic in 3-4 months  or as needed.  With GOCT, Dilation and HVF 24-2

## 2020-12-08 ENCOUNTER — PATIENT OUTREACH (OUTPATIENT)
Dept: OTHER | Facility: OTHER | Age: 85
End: 2020-12-08

## 2020-12-08 NOTE — PROGRESS NOTES
Digital Medicine: Health  Follow-Up    The history is provided by caregiver. The patient has granted authorization to speak to this person.             Reason for review: Blood pressure not at goal      Additional Follow-up details: Mrs. Mao reports that she has taken recent readings although we haven't received them. She requests that I follow up with her tomorrow morning to determine if we have received the readings at that time, if not to trouble shoot further.            Diet-Not assessed          Physical Activity-Not assessed    Medication Adherence-Medication Adherence not addressed.      Substance, Sleep, Stress-Not assessed      Additional monitoring needed.               Topic    Lipid (Cholesterol) Test          Last 5 Patient Entered Readings                                      Current 30 Day Average:      Recent Readings 12/5/2020 11/5/2020 10/28/2020 10/22/2020 10/21/2020    SBP (mmHg) 141 152 130 134 173    DBP (mmHg) 65 68 62 61 87    Pulse 70 69 71 67 62

## 2020-12-09 ENCOUNTER — TELEPHONE (OUTPATIENT)
Dept: CARDIOLOGY | Facility: CLINIC | Age: 85
End: 2020-12-09

## 2020-12-09 ENCOUNTER — PATIENT OUTREACH (OUTPATIENT)
Dept: OTHER | Facility: OTHER | Age: 85
End: 2020-12-09

## 2020-12-09 NOTE — PROGRESS NOTES
Subjective:   Date of Visit: 12/10/20   ?   ?    REFERRING PROVIDER: Talisha Tracy PA-C  [id number] [facility]  [place]  [place] [address]   ?   CHIEF COMPLAINT:  Macrocytic anemia???????   ?     HPI:  89-year-old with prior history for breast cancer (status post mastectomy of right breast and tamoxifen for 5 years per patient), skin cancer (status post resection), end-stage COPD on oxygen (2 L), anxiety and depression referred to us due to persistent macrocytic anemia.  Most recent CBC performed on 12/07/2020 showed hemoglobin of 9.4 grams/deciliter, hematocrit 31.6 with MCV of 110.  Recent iron studies showed adequate iron storage capacity with ferritin at 65 nanograms/cc, normal iron saturation and serum iron levels.  Also noted normal folate levels and B12.  TSH was noted to be normal on 07/01/2020.     Complains of fatigue and shortness of breath with exertion however she attributed to her age.  Unfortunately patient had a fall in November 2020 resulting in contusion of left arm with profuse bleeding per caregiver.      Today she denies abnormal bleed including epistaxis, hemoptysis, hematemesis, hematochezia, melena or hematuria.  She also denies unintentional weight loss, nausea vomiting, abdominal pain, diarrhea dysuria.    Review of Systems   Constitutional: Positive for fatigue. Negative for activity change, appetite change, chills, fever and unexpected weight change.   HENT: Negative for hearing loss, mouth sores, nosebleeds, sore throat, tinnitus, trouble swallowing and voice change.    Eyes: Negative for visual disturbance.   Respiratory: Positive for shortness of breath (On oxygen). Negative for cough and chest tightness.    Cardiovascular: Negative for chest pain, palpitations and leg swelling.   Gastrointestinal: Negative for abdominal pain, anal bleeding, blood in stool, constipation, diarrhea, nausea and vomiting.   Genitourinary: Negative for dysuria, frequency, hematuria, pelvic pain,  vaginal bleeding and vaginal pain.   Musculoskeletal: Negative for arthralgias, back pain, joint swelling and neck pain.   Skin: Negative for color change, pallor, rash and wound.   Allergic/Immunologic: Negative for immunocompromised state.   Neurological: Positive for weakness. Negative for dizziness, tremors, syncope, speech difficulty, light-headedness and headaches.   Hematological: Negative for adenopathy. Does not bruise/bleed easily.   Psychiatric/Behavioral: Negative for agitation, confusion, decreased concentration, hallucinations and sleep disturbance. The patient is not nervous/anxious.        ?   PAST MEDICAL HISTORY:   Past Medical History:   Diagnosis Date    Acute anemia 11/27/2020    Anxiety     Aortic valve disorder 6/17/2013    Arthritis     Breast cancer 1981    Cataract     Chronic bronchitis     Chronic diastolic heart failure 6/17/2013    Closed left arm fracture 4/7/2017    COPD (chronic obstructive pulmonary disease) 6/17/2013    Coronary artery disease 6/17/2013    Decubitus skin ulcer 11/11/2014    Depression     Disorder of kidney and ureter     Emphysema of lung     copd    Fall     Glaucoma     Hyperlipidemia     Hypertension 6/17/2013    Hyponatremia 6/3/2014    Hypothyroidism     Kidney cysts     US retroper    MI (myocardial infarction) 05/1981    Mitral regurgitation 6/17/2013    Nail abnormality 7/16/2014    Osteoporosis     Paronychia of finger of right hand 4/7/2017    Peripheral vascular disease     Pneumonia     dx 02/2012    Pneumonia due to other staphylococcus     Pulmonary hypertension 6/17/2013    Sinus bradycardia 6/18/2013    Skin cancer     Squamous cell carcinoma     Thyroid disease     hypothyroidism    Trouble in sleeping     ?     PAST SURGICAL HISTORY:   Past Surgical History:   Procedure Laterality Date    ADENOIDECTOMY      age 20    AMPUTATION, LOWER LIMB Left 1993    Left Great toe    BREAST SURGERY Right 1996    breast  cancer    CARDIAC CATHETERIZATION      1981    CATARACT EXTRACTION      EYE SURGERY      right cataract removal. lens replacement    FEMUR FRACTURE SURGERY Left 08/2016    FINGER AMPUTATION  1980s    digits 4, 5    FRACTURE SURGERY  2012, & 2014, & 2016    left hip twice, Left Femur the 3rd time.    masectomy Right 1996    ORIF HIP FRACTURE Left 11/2012    TOE AMPUTATION Left 1980s    left foot great hallux    TONSILLECTOMY      age 20    WRIST ARTHROPLASTY Left 2000s      ?   ALLERGIES:   Allergies as of 12/10/2020 - Reviewed 12/10/2020   Allergen Reaction Noted    Atorvastatin Other (See Comments) 12/05/2013    Calcium channel blocking agent diltiazem analogues  07/24/2014      ?   MEDICATIONS:?   Outpatient Medications Marked as Taking for the 12/10/20 encounter (Office Visit) with Mike Duran MD   Medication Sig Dispense Refill    albuterol (VENTOLIN HFA) 90 mcg/actuation inhaler Inhale 2 puffs into the lungs every 4 (four) hours as needed for Wheezing. 18 g 3    albuterol-ipratropium (DUO-NEB) 2.5 mg-0.5 mg/3 mL nebulizer solution USE 1 VIAL VIA NEBULIZER EVERY 6 TO 8 HOURS AS NEEDED FOR WHEEZING 360 mL 6    allopurinoL (ZYLOPRIM) 100 MG tablet Take 2 tablets (200 mg total) by mouth once daily. 180 tablet 1    aspirin (ECOTRIN) 81 MG EC tablet Take 81 mg by mouth once daily.      bimatoprost (LUMIGAN) 0.03 % ophthalmic drops Place 1 drop into both eyes nightly. 2.5 mL 12    bisacodyl (DULCOLAX) 5 mg EC tablet Take 5 mg by mouth daily as needed for Constipation.      CARBOXYMETHYLCELLULOSE SODIUM (REFRESH OPHT) Apply 1 drop to eye daily as needed.       citalopram (CELEXA) 40 MG tablet TAKE ONE TABLET BY MOUTH ONE TIME DAILY 90 tablet 2    clonazePAM (KLONOPIN) 0.5 MG tablet Take 1 tablet (0.5 mg total) by mouth 3 (three) times daily. 90 tablet 1    doxycycline (VIBRA-TABS) 100 MG tablet Take 1 tablet (100 mg total) by mouth 2 (two) times daily. for 14 days 28 tablet 0     facial-body wipes (CLEANSING EYELID WIPES TOP)       ferrous sulfate (IRON, FERROUS SULFATE,) 325 mg (65 mg iron) Tab tablet       fluticasone furoate-vilanterol (BREO ELLIPTA) 100-25 mcg/dose diskus inhaler Inhale 1 puff into the lungs once daily. 1 each 11    HYDROcodone-acetaminophen (NORCO) 5-325 mg per tablet Take 1/2 to 1 tablet one time during the the day as needed and 1 1/2 tablet at night 75 tablet 0    levothyroxine (SYNTHROID) 50 MCG tablet Take 1 tablet (50 mcg total) by mouth once daily. 90 tablet 1    losartan (COZAAR) 50 MG tablet TAKE ONE TABLET BY MOUTH ONCE A DAY 90 tablet 0    LUMIGAN 0.01 % Drop Place 1 drop into both eyes every evening. 2.5 mL 11    MULTIVITAMIN Take 1 tablet by mouth Daily.      OXYGEN-AIR DELIVERY SYSTEMS MISC 2 L by Misc.(Non-Drug; Combo Route) route. Per min      psyllium (METAMUCIL) powder Take 1 packet by mouth once daily.      UNKNOWN TO PATIENT Cough syrup as needed        ?   SOCIAL HISTORY:?   Social History     Tobacco Use    Smoking status: Former Smoker     Packs/day: 0.50     Years: 40.00     Pack years: 20.00     Quit date:      Years since quittin.9    Smokeless tobacco: Never Used   Substance Use Topics    Alcohol use: No     Frequency: 4 or more times a week     Drinks per session: 1 or 2     Binge frequency: Never        ?   FAMILY HISTORY:   family history includes Arthritis in her father; Cancer in her sister and sister; Glaucoma in her daughter; Heart attack in her mother; Hypertension in her mother; Stroke in her mother.   ?     Objective:      Physical Exam  Constitutional:       General: She is not in acute distress.     Appearance: She is well-developed. She is cachectic. She is ill-appearing. She is not toxic-appearing.   HENT:      Head: Normocephalic and atraumatic.      Mouth/Throat:      Pharynx: No oropharyngeal exudate.   Eyes:      General: No scleral icterus.        Right eye: No discharge.         Left eye: No discharge.       Conjunctiva/sclera: Conjunctivae normal.      Pupils: Pupils are equal, round, and reactive to light.   Neck:      Musculoskeletal: Normal range of motion and neck supple.      Thyroid: No thyromegaly.   Cardiovascular:      Rate and Rhythm: Normal rate and regular rhythm.      Heart sounds: No murmur.   Pulmonary:      Effort: Pulmonary effort is normal. No respiratory distress.      Breath sounds: Normal breath sounds.   Chest:      Chest wall: No tenderness.   Abdominal:      General: Bowel sounds are normal. There is no distension.      Palpations: Abdomen is soft. There is no mass.      Tenderness: There is no abdominal tenderness. There is no guarding or rebound.   Musculoskeletal: Normal range of motion.         General: No tenderness.   Lymphadenopathy:      Cervical: No cervical adenopathy.      Right cervical: No superficial cervical adenopathy.     Left cervical: No superficial cervical adenopathy.      Upper Body:      Right upper body: No supraclavicular or pectoral adenopathy.      Left upper body: No supraclavicular or pectoral adenopathy.   Skin:     General: Skin is warm and dry.      Capillary Refill: Capillary refill takes 2 to 3 seconds.      Coloration: Skin is not pale.      Findings: No erythema or rash.   Neurological:      Mental Status: She is alert and oriented to person, place, and time.      Cranial Nerves: No cranial nerve deficit.      Sensory: No sensory deficit.   Psychiatric:         Behavior: Behavior normal. Behavior is cooperative.         Judgment: Judgment normal.         ?   Vitals:    12/10/20 1142   BP: (!) 162/82   Pulse: 78   Temp: 98.8 °F (37.1 °C)      ?     ECOG SCORE            Laboratory:  ?   No visits with results within 1 Day(s) from this visit.   Latest known visit with results is:   Lab Visit on 12/07/2020   Component Date Value Ref Range Status    Vitamin B-12 12/07/2020 1476* 210 - 950 pg/mL Final    Folate 12/07/2020 17.7  4.0 - 24.0 ng/mL Final    WBC  12/07/2020 6.03  3.90 - 12.70 K/uL Final    RBC 12/07/2020 2.88* 4.00 - 5.40 M/uL Final    Hemoglobin 12/07/2020 9.4* 12.0 - 16.0 g/dL Final    Hematocrit 12/07/2020 31.6* 37.0 - 48.5 % Final    MCV 12/07/2020 110* 82 - 98 fL Final    MCH 12/07/2020 32.6* 27.0 - 31.0 pg Final    MCHC 12/07/2020 29.7* 32.0 - 36.0 g/dL Final    RDW 12/07/2020 16.6* 11.5 - 14.5 % Final    Platelets 12/07/2020 225  150 - 350 K/uL Final    MPV 12/07/2020 11.1  9.2 - 12.9 fL Final    Immature Granulocytes 12/07/2020 0.3  0.0 - 0.5 % Final    Gran # (ANC) 12/07/2020 3.6  1.8 - 7.7 K/uL Final    Immature Grans (Abs) 12/07/2020 0.02  0.00 - 0.04 K/uL Final    Lymph # 12/07/2020 1.6  1.0 - 4.8 K/uL Final    Mono # 12/07/2020 0.6  0.3 - 1.0 K/uL Final    Eos # 12/07/2020 0.2  0.0 - 0.5 K/uL Final    Baso # 12/07/2020 0.02  0.00 - 0.20 K/uL Final    nRBC 12/07/2020 0  0 /100 WBC Final    Gran % 12/07/2020 60.3  38.0 - 73.0 % Final    Lymph % 12/07/2020 26.0  18.0 - 48.0 % Final    Mono % 12/07/2020 9.6  4.0 - 15.0 % Final    Eosinophil % 12/07/2020 3.5  0.0 - 8.0 % Final    Basophil % 12/07/2020 0.3  0.0 - 1.9 % Final    Differential Method 12/07/2020 Automated   Final      ?   Tumor markers   ?   ?   Imaging: X-Ray Chest PA And Lateral  Narrative: EXAMINATION:  XR CHEST PA AND LATERAL    CLINICAL HISTORY:  Emphysema, unspecified    TECHNIQUE:  PA and lateral views of the chest were performed.    COMPARISON:  The exam from 11/24/2020    FINDINGS:  Emphysematous changes with bilateral pleuroparenchymal thickening and bilateral areas of scarring with unchanged blunting of the bilateral costophrenic angle secondary to effusions and/or pleural thickening.  No superimposed focal consolidation.  Osteopenia and thoracic spondylosis with old healed fractures of the bilateral humeri noted.  Impression: Stable chest as above.    Electronically signed by: Jose L Power MD  Date:    12/01/2020  Time:    11:36      ?      Pathology:  Pathology Results  (Last 10 years)    None           ?   Assessment/Plan:       1. Macrocytic anemia    2. Acute blood loss anemia    3. History of breast cancer          Macrocytic anemia  Unknown etiology, cannot rule out myelodysplastic syndrome given her age and prior malignancy history.    Will not recommend bone marrow biopsy at this time.      Will continue to monitor and manage conservatively.  Recent drop in hemoglobin was likely related to the contusion on left arm.  Most recent CBC showed recovery in red blood cell with hemoglobin noted at 9.4 grams/deciliter.    Return back in 2 months with repeat labs or sooner if needed.    History of breast cancer  Status post right breast mastectomy and adjuvant endocrine therapy.      ?Macrocytic anemia  -     CBC Oncology; Future; Expected date: 12/10/2020  -     Comprehensive Metabolic Panel; Future; Expected date: 12/10/2020    Acute blood loss anemia  -     Ambulatory referral/consult to Hematology / Oncology    History of breast cancer       Follow-Up: Follow up in about 2 months (around 2/10/2021).    JACKI KNOWLES Md., Ph.D  Hematology & Oncology Department  Phone #: [phone]

## 2020-12-09 NOTE — TELEPHONE ENCOUNTER
[facility]  nurse called with an update on pt status. PT is having sxs of B LE edema, labored breathing and coughing since completing 4 day dose of 20 mg Lasix and Potassium ordered by internal medicine. HH wants to know if the pt can be put on Lasix and Potassium to help tx symptoms as pt did will on initial dose.     Request sent over to Dr. Trejo to advise.

## 2020-12-10 ENCOUNTER — OFFICE VISIT (OUTPATIENT)
Dept: HEMATOLOGY/ONCOLOGY | Facility: CLINIC | Age: 85
End: 2020-12-10
Payer: MEDICARE

## 2020-12-10 ENCOUNTER — PATIENT MESSAGE (OUTPATIENT)
Dept: CARDIOLOGY | Facility: CLINIC | Age: 85
End: 2020-12-10

## 2020-12-10 ENCOUNTER — PATIENT MESSAGE (OUTPATIENT)
Dept: INTERNAL MEDICINE | Facility: CLINIC | Age: 85
End: 2020-12-10

## 2020-12-10 VITALS
SYSTOLIC BLOOD PRESSURE: 162 MMHG | HEIGHT: 63 IN | OXYGEN SATURATION: 98 % | DIASTOLIC BLOOD PRESSURE: 82 MMHG | BODY MASS INDEX: 14.71 KG/M2 | HEART RATE: 78 BPM | TEMPERATURE: 99 F | WEIGHT: 83 LBS

## 2020-12-10 DIAGNOSIS — D53.9 MACROCYTIC ANEMIA: ICD-10-CM

## 2020-12-10 DIAGNOSIS — D62 ACUTE BLOOD LOSS ANEMIA: ICD-10-CM

## 2020-12-10 DIAGNOSIS — I50.9 ACUTE HEART FAILURE, UNSPECIFIED HEART FAILURE TYPE: ICD-10-CM

## 2020-12-10 DIAGNOSIS — Z85.3 HISTORY OF BREAST CANCER: ICD-10-CM

## 2020-12-10 PROCEDURE — 3288F PR FALLS RISK ASSESSMENT DOCUMENTED: ICD-10-PCS | Mod: HCNC,CPTII,S$GLB, | Performed by: INTERNAL MEDICINE

## 2020-12-10 PROCEDURE — 1100F PR PT FALLS ASSESS DOC 2+ FALLS/FALL W/INJURY/YR: ICD-10-PCS | Mod: HCNC,CPTII,S$GLB, | Performed by: INTERNAL MEDICINE

## 2020-12-10 PROCEDURE — 99499 UNLISTED E&M SERVICE: CPT | Mod: S$GLB,,, | Performed by: INTERNAL MEDICINE

## 2020-12-10 PROCEDURE — 99205 OFFICE O/P NEW HI 60 MIN: CPT | Mod: HCNC,S$GLB,, | Performed by: INTERNAL MEDICINE

## 2020-12-10 PROCEDURE — 1159F MED LIST DOCD IN RCRD: CPT | Mod: HCNC,S$GLB,, | Performed by: INTERNAL MEDICINE

## 2020-12-10 PROCEDURE — 3288F FALL RISK ASSESSMENT DOCD: CPT | Mod: HCNC,CPTII,S$GLB, | Performed by: INTERNAL MEDICINE

## 2020-12-10 PROCEDURE — 99999 PR PBB SHADOW E&M-EST. PATIENT-LVL V: ICD-10-PCS | Mod: PBBFAC,HCNC,, | Performed by: INTERNAL MEDICINE

## 2020-12-10 PROCEDURE — 1159F PR MEDICATION LIST DOCUMENTED IN MEDICAL RECORD: ICD-10-PCS | Mod: HCNC,S$GLB,, | Performed by: INTERNAL MEDICINE

## 2020-12-10 PROCEDURE — 99999 PR PBB SHADOW E&M-EST. PATIENT-LVL V: CPT | Mod: PBBFAC,HCNC,, | Performed by: INTERNAL MEDICINE

## 2020-12-10 PROCEDURE — 1100F PTFALLS ASSESS-DOCD GE2>/YR: CPT | Mod: HCNC,CPTII,S$GLB, | Performed by: INTERNAL MEDICINE

## 2020-12-10 PROCEDURE — 1126F PR PAIN SEVERITY QUANTIFIED, NO PAIN PRESENT: ICD-10-PCS | Mod: HCNC,S$GLB,, | Performed by: INTERNAL MEDICINE

## 2020-12-10 PROCEDURE — 99205 PR OFFICE/OUTPT VISIT, NEW, LEVL V, 60-74 MIN: ICD-10-PCS | Mod: HCNC,S$GLB,, | Performed by: INTERNAL MEDICINE

## 2020-12-10 PROCEDURE — 99499 RISK ADDL DX/OHS AUDIT: ICD-10-PCS | Mod: S$GLB,,, | Performed by: INTERNAL MEDICINE

## 2020-12-10 PROCEDURE — 1126F AMNT PAIN NOTED NONE PRSNT: CPT | Mod: HCNC,S$GLB,, | Performed by: INTERNAL MEDICINE

## 2020-12-10 RX ORDER — FUROSEMIDE 20 MG/1
20 TABLET ORAL DAILY
Qty: 4 TABLET | Refills: 0 | Status: SHIPPED | OUTPATIENT
Start: 2020-12-10 | End: 2020-12-14 | Stop reason: SDUPTHER

## 2020-12-10 RX ORDER — POTASSIUM CHLORIDE 750 MG/1
10 TABLET, EXTENDED RELEASE ORAL DAILY
Qty: 4 TABLET | Refills: 0 | Status: SHIPPED | OUTPATIENT
Start: 2020-12-10 | End: 2020-12-14 | Stop reason: SDUPTHER

## 2020-12-10 NOTE — TELEPHONE ENCOUNTER
----- Message from Viviana Grey MA sent at 12/9/2020  3:25 PM CST -----  [facility]  nurse called with an update on pt status. PT is having sxs of B LE edema, labored breathing and coughing since completing 4 day dose of 20 mg Lasix and Potassium ordered by internal medicine. HH wants to know if the pt can be put on Lasix and Potassium to help tx symptoms as pt did will on initial dose.     Please advise, thanks!

## 2020-12-10 NOTE — LETTER
December 10, 2020      Talisha Tracy PA-C  [address] [address] [place] [address]           [facility]  [address] [address] [facility] [facility] [place] [address]  Phone: [phone]  Fax: [phone]          Patient: Anju Rivera   MR Number: [id number]   YOB: 1929   Date of Visit: 12/10/2020       Dear Talisha Tracy:    Thank you for referring Anju Rivera to me for evaluation. Attached you will find relevant portions of my assessment and plan of care.    If you have questions, please do not hesitate to call me. I look forward to following Anju Rivera along with you.    Sincerely,    Mike Duran MD    Enclosure  CC:  No Recipients    If you would like to receive this communication electronically, please contact [email or web address] or [phone] to request more information on [facility] [employer] access.    For providers and/or their staff who would like to refer a patient to [facility], please contact us through our one-stop-shop provider referral line, [facility], at [phone].    If you feel you have received this communication in error or would no longer like to receive these types of communications, please e-mail [email or web address]

## 2020-12-10 NOTE — ASSESSMENT & PLAN NOTE
Unknown etiology, cannot rule out myelodysplastic syndrome given her age and prior malignancy history.    Will not recommend bone marrow biopsy at this time.      Will continue to monitor and manage conservatively.  Recent drop in hemoglobin was likely related to the contusion on left arm.  Most recent CBC showed recovery in red blood cell with hemoglobin noted at 9.4 grams/deciliter.    Return back in 2 months with repeat labs or sooner if needed.

## 2020-12-10 NOTE — PROGRESS NOTES
Mayco reports that the readings are now being transmitted and that she doesn't find the need to do more troubleshooting about it.

## 2020-12-14 ENCOUNTER — LAB VISIT (OUTPATIENT)
Dept: LAB | Facility: HOSPITAL | Age: 85
End: 2020-12-14
Attending: INTERNAL MEDICINE
Payer: MEDICARE

## 2020-12-14 ENCOUNTER — OFFICE VISIT (OUTPATIENT)
Dept: CARDIOLOGY | Facility: CLINIC | Age: 85
End: 2020-12-14
Payer: MEDICARE

## 2020-12-14 VITALS
BODY MASS INDEX: 14.7 KG/M2 | HEART RATE: 69 BPM | HEIGHT: 63 IN | DIASTOLIC BLOOD PRESSURE: 90 MMHG | SYSTOLIC BLOOD PRESSURE: 170 MMHG | OXYGEN SATURATION: 98 %

## 2020-12-14 DIAGNOSIS — I50.32 CHRONIC DIASTOLIC HEART FAILURE: Chronic | ICD-10-CM

## 2020-12-14 DIAGNOSIS — I65.23 BILATERAL CAROTID ARTERY STENOSIS: ICD-10-CM

## 2020-12-14 DIAGNOSIS — R94.31 ABNORMAL ECG: ICD-10-CM

## 2020-12-14 DIAGNOSIS — I49.3 PVC'S (PREMATURE VENTRICULAR CONTRACTIONS): ICD-10-CM

## 2020-12-14 DIAGNOSIS — R06.02 SHORTNESS OF BREATH: ICD-10-CM

## 2020-12-14 DIAGNOSIS — J43.9 PULMONARY EMPHYSEMA, UNSPECIFIED EMPHYSEMA TYPE: Chronic | ICD-10-CM

## 2020-12-14 DIAGNOSIS — I34.0 NONRHEUMATIC MITRAL VALVE REGURGITATION: Chronic | ICD-10-CM

## 2020-12-14 DIAGNOSIS — I27.20 PULMONARY HYPERTENSION: Chronic | ICD-10-CM

## 2020-12-14 DIAGNOSIS — I10 ESSENTIAL HYPERTENSION: Chronic | ICD-10-CM

## 2020-12-14 DIAGNOSIS — I50.9 ACUTE HEART FAILURE, UNSPECIFIED HEART FAILURE TYPE: ICD-10-CM

## 2020-12-14 DIAGNOSIS — I25.2 OLD MI (MYOCARDIAL INFARCTION): Chronic | ICD-10-CM

## 2020-12-14 DIAGNOSIS — I35.9 AORTIC VALVE DISORDER: Chronic | ICD-10-CM

## 2020-12-14 DIAGNOSIS — I35.0 NONRHEUMATIC AORTIC VALVE STENOSIS: ICD-10-CM

## 2020-12-14 DIAGNOSIS — I73.9 PERIPHERAL VASCULAR DISEASE: ICD-10-CM

## 2020-12-14 DIAGNOSIS — I50.32 CHRONIC DIASTOLIC HEART FAILURE: Primary | Chronic | ICD-10-CM

## 2020-12-14 DIAGNOSIS — E78.2 MIXED HYPERLIPIDEMIA: Chronic | ICD-10-CM

## 2020-12-14 DIAGNOSIS — I25.118 CORONARY ARTERY DISEASE OF NATIVE ARTERY OF NATIVE HEART WITH STABLE ANGINA PECTORIS: Chronic | ICD-10-CM

## 2020-12-14 PROCEDURE — 80053 COMPREHEN METABOLIC PANEL: CPT | Mod: HCNC

## 2020-12-14 PROCEDURE — 1159F PR MEDICATION LIST DOCUMENTED IN MEDICAL RECORD: ICD-10-PCS | Mod: HCNC,S$GLB,, | Performed by: INTERNAL MEDICINE

## 2020-12-14 PROCEDURE — 99999 PR PBB SHADOW E&M-EST. PATIENT-LVL III: CPT | Mod: PBBFAC,HCNC,, | Performed by: INTERNAL MEDICINE

## 2020-12-14 PROCEDURE — 99214 OFFICE O/P EST MOD 30 MIN: CPT | Mod: HCNC,S$GLB,, | Performed by: INTERNAL MEDICINE

## 2020-12-14 PROCEDURE — 36415 COLL VENOUS BLD VENIPUNCTURE: CPT | Mod: HCNC

## 2020-12-14 PROCEDURE — 83880 ASSAY OF NATRIURETIC PEPTIDE: CPT | Mod: HCNC

## 2020-12-14 PROCEDURE — 99499 RISK ADDL DX/OHS AUDIT: ICD-10-PCS | Mod: S$GLB,,, | Performed by: INTERNAL MEDICINE

## 2020-12-14 PROCEDURE — 99214 PR OFFICE/OUTPT VISIT, EST, LEVL IV, 30-39 MIN: ICD-10-PCS | Mod: HCNC,S$GLB,, | Performed by: INTERNAL MEDICINE

## 2020-12-14 PROCEDURE — 1159F MED LIST DOCD IN RCRD: CPT | Mod: HCNC,S$GLB,, | Performed by: INTERNAL MEDICINE

## 2020-12-14 PROCEDURE — 1126F PR PAIN SEVERITY QUANTIFIED, NO PAIN PRESENT: ICD-10-PCS | Mod: HCNC,S$GLB,, | Performed by: INTERNAL MEDICINE

## 2020-12-14 PROCEDURE — 99999 PR PBB SHADOW E&M-EST. PATIENT-LVL III: ICD-10-PCS | Mod: PBBFAC,HCNC,, | Performed by: INTERNAL MEDICINE

## 2020-12-14 PROCEDURE — 99499 UNLISTED E&M SERVICE: CPT | Mod: S$GLB,,, | Performed by: INTERNAL MEDICINE

## 2020-12-14 PROCEDURE — 1126F AMNT PAIN NOTED NONE PRSNT: CPT | Mod: HCNC,S$GLB,, | Performed by: INTERNAL MEDICINE

## 2020-12-14 RX ORDER — POTASSIUM CHLORIDE 750 MG/1
10 TABLET, EXTENDED RELEASE ORAL DAILY
Qty: 30 TABLET | Refills: 12 | Status: SHIPPED | OUTPATIENT
Start: 2020-12-14 | End: 2020-12-15 | Stop reason: ALTCHOICE

## 2020-12-14 RX ORDER — LOSARTAN POTASSIUM 50 MG/1
50 TABLET ORAL DAILY
Qty: 90 TABLET | Refills: 4 | Status: SHIPPED | OUTPATIENT
Start: 2020-12-14

## 2020-12-14 RX ORDER — FUROSEMIDE 20 MG/1
20 TABLET ORAL DAILY
Qty: 30 TABLET | Refills: 12 | Status: SHIPPED | OUTPATIENT
Start: 2020-12-14

## 2020-12-15 ENCOUNTER — TELEPHONE (OUTPATIENT)
Dept: CARDIOLOGY | Facility: HOSPITAL | Age: 85
End: 2020-12-15

## 2020-12-15 LAB
ALBUMIN SERPL BCP-MCNC: 3.6 G/DL (ref 3.5–5.2)
ALP SERPL-CCNC: 81 U/L (ref 55–135)
ALT SERPL W/O P-5'-P-CCNC: 13 U/L (ref 10–44)
ANION GAP SERPL CALC-SCNC: 9 MMOL/L (ref 8–16)
AST SERPL-CCNC: 28 U/L (ref 10–40)
BILIRUB SERPL-MCNC: 0.2 MG/DL (ref 0.1–1)
BNP SERPL-MCNC: 826 PG/ML (ref 0–99)
BUN SERPL-MCNC: 49 MG/DL (ref 10–30)
CALCIUM SERPL-MCNC: 9.5 MG/DL (ref 8.7–10.5)
CHLORIDE SERPL-SCNC: 98 MMOL/L (ref 95–110)
CO2 SERPL-SCNC: 31 MMOL/L (ref 23–29)
CREAT SERPL-MCNC: 1.6 MG/DL (ref 0.5–1.4)
EST. GFR  (AFRICAN AMERICAN): 32.2 ML/MIN/1.73 M^2
EST. GFR  (NON AFRICAN AMERICAN): 28 ML/MIN/1.73 M^2
GLUCOSE SERPL-MCNC: 88 MG/DL (ref 70–110)
POTASSIUM SERPL-SCNC: 5.5 MMOL/L (ref 3.5–5.1)
PROT SERPL-MCNC: 6.8 G/DL (ref 6–8.4)
SODIUM SERPL-SCNC: 138 MMOL/L (ref 136–145)

## 2020-12-16 NOTE — TELEPHONE ENCOUNTER
Please call pt and her daughter.  Stop potassium pill immediately has her potassium is mildly elevated.  Change lasix to 20 mg every other day.   her CMP, BNP to one week from now instead of 2 weeks.    Dr Trejo

## 2020-12-16 NOTE — TELEPHONE ENCOUNTER
Spoke with pt daughter and informed her,    Stop potassium pill immediately as her   potassium is mildly elevated.   Change lasix to 20 mg every other day.   Moved her CMP, BNP to one week from now.  Understanding was verbalized with no questions/concerns.

## 2020-12-17 ENCOUNTER — OFFICE VISIT (OUTPATIENT)
Dept: PRIMARY CARE CLINIC | Facility: CLINIC | Age: 85
End: 2020-12-17
Payer: MEDICARE

## 2020-12-17 ENCOUNTER — PATIENT MESSAGE (OUTPATIENT)
Dept: GASTROENTEROLOGY | Facility: CLINIC | Age: 85
End: 2020-12-17

## 2020-12-17 VITALS
HEIGHT: 63 IN | TEMPERATURE: 98 F | SYSTOLIC BLOOD PRESSURE: 120 MMHG | DIASTOLIC BLOOD PRESSURE: 60 MMHG | HEART RATE: 84 BPM | BODY MASS INDEX: 13.69 KG/M2 | WEIGHT: 77.25 LBS

## 2020-12-17 DIAGNOSIS — R29.6 RECURRENT FALLS WHILE WALKING: ICD-10-CM

## 2020-12-17 DIAGNOSIS — E78.5 HYPERLIPIDEMIA, UNSPECIFIED HYPERLIPIDEMIA TYPE: ICD-10-CM

## 2020-12-17 DIAGNOSIS — Z79.899 CHRONIC PRESCRIPTION BENZODIAZEPINE USE: ICD-10-CM

## 2020-12-17 DIAGNOSIS — I25.118 CORONARY ARTERY DISEASE OF NATIVE ARTERY OF NATIVE HEART WITH STABLE ANGINA PECTORIS: Chronic | ICD-10-CM

## 2020-12-17 DIAGNOSIS — I25.2 OLD MI (MYOCARDIAL INFARCTION): ICD-10-CM

## 2020-12-17 DIAGNOSIS — I73.9 PERIPHERAL VASCULAR DISEASE: ICD-10-CM

## 2020-12-17 DIAGNOSIS — E03.9 ACQUIRED HYPOTHYROIDISM: ICD-10-CM

## 2020-12-17 DIAGNOSIS — S98.112A AMPUTATION OF LEFT GREAT TOE: ICD-10-CM

## 2020-12-17 DIAGNOSIS — I50.32 CHRONIC DIASTOLIC HEART FAILURE: Chronic | ICD-10-CM

## 2020-12-17 DIAGNOSIS — I35.0 NONRHEUMATIC AORTIC VALVE STENOSIS: ICD-10-CM

## 2020-12-17 DIAGNOSIS — Z79.891 CHRONIC PRESCRIPTION OPIATE USE: ICD-10-CM

## 2020-12-17 DIAGNOSIS — D50.8 IRON DEFICIENCY ANEMIA SECONDARY TO INADEQUATE DIETARY IRON INTAKE: ICD-10-CM

## 2020-12-17 DIAGNOSIS — I35.9 AORTIC VALVE DISORDER: Chronic | ICD-10-CM

## 2020-12-17 DIAGNOSIS — I27.20 PULMONARY HYPERTENSION: Chronic | ICD-10-CM

## 2020-12-17 DIAGNOSIS — N18.4 STAGE 4 CHRONIC KIDNEY DISEASE: ICD-10-CM

## 2020-12-17 DIAGNOSIS — F41.9 ANXIETY: ICD-10-CM

## 2020-12-17 DIAGNOSIS — I77.1 TORTUOUS AORTA: ICD-10-CM

## 2020-12-17 DIAGNOSIS — G89.29 CHRONIC RIGHT SHOULDER PAIN: ICD-10-CM

## 2020-12-17 DIAGNOSIS — J43.9 PULMONARY EMPHYSEMA, UNSPECIFIED EMPHYSEMA TYPE: Chronic | ICD-10-CM

## 2020-12-17 DIAGNOSIS — F32.A DEPRESSION, UNSPECIFIED DEPRESSION TYPE: ICD-10-CM

## 2020-12-17 DIAGNOSIS — S40.812D ABRASION OF LEFT UPPER EXTREMITY, SUBSEQUENT ENCOUNTER: ICD-10-CM

## 2020-12-17 DIAGNOSIS — M25.511 CHRONIC RIGHT SHOULDER PAIN: ICD-10-CM

## 2020-12-17 DIAGNOSIS — G25.81 RLS (RESTLESS LEGS SYNDROME): ICD-10-CM

## 2020-12-17 DIAGNOSIS — R54 FRAIL ELDERLY: Primary | ICD-10-CM

## 2020-12-17 DIAGNOSIS — M81.0 AGE-RELATED OSTEOPOROSIS WITHOUT CURRENT PATHOLOGICAL FRACTURE: ICD-10-CM

## 2020-12-17 DIAGNOSIS — Z85.3 HISTORY OF BREAST CANCER: ICD-10-CM

## 2020-12-17 PROCEDURE — 99999 PR PBB SHADOW E&M-EST. PATIENT-LVL V: ICD-10-PCS | Mod: PBBFAC,HCNC,, | Performed by: FAMILY MEDICINE

## 2020-12-17 PROCEDURE — 99215 OFFICE O/P EST HI 40 MIN: CPT | Mod: HCNC,S$GLB,, | Performed by: FAMILY MEDICINE

## 2020-12-17 PROCEDURE — 99999 PR PBB SHADOW E&M-EST. PATIENT-LVL V: CPT | Mod: PBBFAC,HCNC,, | Performed by: FAMILY MEDICINE

## 2020-12-17 PROCEDURE — 99499 UNLISTED E&M SERVICE: CPT | Mod: S$GLB,,, | Performed by: FAMILY MEDICINE

## 2020-12-17 PROCEDURE — 1159F MED LIST DOCD IN RCRD: CPT | Mod: HCNC,S$GLB,, | Performed by: FAMILY MEDICINE

## 2020-12-17 PROCEDURE — 1126F AMNT PAIN NOTED NONE PRSNT: CPT | Mod: HCNC,S$GLB,, | Performed by: FAMILY MEDICINE

## 2020-12-17 PROCEDURE — 1100F PTFALLS ASSESS-DOCD GE2>/YR: CPT | Mod: HCNC,CPTII,S$GLB, | Performed by: FAMILY MEDICINE

## 2020-12-17 PROCEDURE — 1159F PR MEDICATION LIST DOCUMENTED IN MEDICAL RECORD: ICD-10-PCS | Mod: HCNC,S$GLB,, | Performed by: FAMILY MEDICINE

## 2020-12-17 PROCEDURE — 99215 PR OFFICE/OUTPT VISIT, EST, LEVL V, 40-54 MIN: ICD-10-PCS | Mod: HCNC,S$GLB,, | Performed by: FAMILY MEDICINE

## 2020-12-17 PROCEDURE — 99499 RISK ADDL DX/OHS AUDIT: ICD-10-PCS | Mod: S$GLB,,, | Performed by: FAMILY MEDICINE

## 2020-12-17 PROCEDURE — 3288F PR FALLS RISK ASSESSMENT DOCUMENTED: ICD-10-PCS | Mod: HCNC,CPTII,S$GLB, | Performed by: FAMILY MEDICINE

## 2020-12-17 PROCEDURE — 1126F PR PAIN SEVERITY QUANTIFIED, NO PAIN PRESENT: ICD-10-PCS | Mod: HCNC,S$GLB,, | Performed by: FAMILY MEDICINE

## 2020-12-17 PROCEDURE — 1100F PR PT FALLS ASSESS DOC 2+ FALLS/FALL W/INJURY/YR: ICD-10-PCS | Mod: HCNC,CPTII,S$GLB, | Performed by: FAMILY MEDICINE

## 2020-12-17 PROCEDURE — 3288F FALL RISK ASSESSMENT DOCD: CPT | Mod: HCNC,CPTII,S$GLB, | Performed by: FAMILY MEDICINE

## 2020-12-17 RX ORDER — HYDROCODONE BITARTRATE AND ACETAMINOPHEN 5; 325 MG/1; MG/1
TABLET ORAL
Qty: 45 TABLET | Refills: 0 | Status: SHIPPED | OUTPATIENT
Start: 2020-12-17 | End: 2021-01-19 | Stop reason: SDUPTHER

## 2020-12-17 RX ORDER — CITALOPRAM 40 MG/1
TABLET, FILM COATED ORAL
Qty: 90 TABLET | Refills: 2
Start: 2020-12-17

## 2020-12-17 NOTE — PROGRESS NOTES
Subjective:      Patient ID: Anju Rivera is a 89 y.o. female.    Chief Complaint: Establish Care    Disclaimer:  This note is prepared using voice recognition software and as such is likely to have errors and has not been proof read. Please contact me for questions.     Anju Rivera is a 89 y.o. female who presents today to establish care.   HPI:prior PCP was Dr. 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     Reports that fluid was building up before the fall in Nov that ended up with fall and clavicle and rib and hospitalization. Went in on 11/9 and home on 11/12.  Did covid test on her and was negative at admission.   Now with .   Has a frozen shoulder on the right for about 2005 and fell on that side and broke the collarbone. Not certain if the rib fracture or not.    Before the fall was walking with walker to bathroom and dining room about it. And has bedside commode . On Nasal canula 2L o2 all the time for 10 yrs.   Has been on chronic opiates with her previous PCP due to recurrent falls and fractures and generalized pain.  Is also on chronic benzos of Klonopin up to t.i.d..  Did discussed increased risk with her age as well as increased risk for falls and decreased breathing she has several risk factors for which this is not a good combination.  Did stressed importance to this to the patient's daughter Violetta her son-in-law Salo and the patient's self.  She is willing to work on reducing her opiate dosing down as she would like to stay on the Klonopin as it helps more with restless legs.    Her base to concerns her restless legs in the amount of pain she has.  There willing to try Tylenol.    She has also had decreased kidney function since having heart  failure and with diuresis.  Creatinine and GFR now at 28.  Repeating again next week.  For this reason we need to reduce some dosing of the medication.  Ultimately I would like to do Cymbalta but with decreased kidney function is not recommended at this point time will decrease Celexa to see if this helps some with restless legs as well as she could potentially try adding iron to her diet as she has known iron deficiency anemia.    Will also check a ferritin level at her next visit.    Consideration for Cymbalta in the future may be beneficial of her kidney function improves.  Could try low-dose magnesium tablet at night as well.    Currently being aggressively followed by Pulmonary as well as Orthopedics for the clavicle fracture Dr. jyoti Morfin at [facility].  Is also being seen by Pulmonary due to chronic COPD.    Has a lot of anxiety issues and depression issues.  Seems to be stable at this time.    Did recently have an abrasion on the left forearm for which she is receiving wound therapy as well with nursing.  Seems to be doing well.    Below is a copy of some of the notes from Dr. Trejo most recent visit as well.    HH through [facility]. Has nursing, PT, OT, and some supplies when needing for wounds.  Has a   Negative stress mpi 2/16.  Echo 2/16 showed normal LVEF, mild AS, mild PHTN.   She saw Dr. Jackson, [facility] surgery, 2019 for her PAD, with med mgt advised at her advanced frail age.  Echo 9/18 normal LV function, LVH, mild-mod AS.  ecg 9/28/20 NSR, LVH.  No acute changes.  Now here.  Pt recently put on short course of Lasix by PCP for dyspnea, CHF.  She called requesting refill.  BNP was much higher than past values.  BNP 1123 on 12/1/20 lab.  She is weak, frail at her advanced age and on home O2 for years.  Has fall risk issues.  Has chronic dyspnea but gets worse at times.  Has chronic cough.  Uses her inhalers.  S/p doxycycline course few days ago.  BP elevated.  No typical angina.  Has some  atypical cp with deep breathing.   No foot ulcers.  F/u by digital HTN program.   CXR 12/1/20 small pleural effusions.      1. Chronic diastolic heart failure   2. Abnormal ECG   3. Aortic valve disorder   4. Bilateral carotid artery stenosis   5. Coronary artery disease of native artery of native heart with stable angina pectoris   6. Essential hypertension   7. HX of MI   8. Nonrheumatic mitral valve regurgitation   9. Mixed hyperlipidemia   10. Nonrheumatic aortic valve stenosis   11. Peripheral vascular disease   12. Pulmonary hypertension   13. PVC's (premature ventricular contractions)   14. Shortness of breath   15. Pulmonary emphysema, unspecified emphysema type   16. Acute heart failure, unspecified heart failure type      Plan:             Worsening diastolic CHF sxs.  Add back Lasix 20 mg qd.  Potassium chloride 10 meq qd.  CMP today or tomorrow.  Repeat CMP + BNP 2 weeks.  Cardiac low salt diet.  Med tx for CAD/PAD.  Frail condition -- not candidate for invasive cardiac workup.  Conservative med mgt advised.  Continue home O2/pulmonary f/u.  Echocardiogram.  Phone review.  F/u 6 weeks.          CONCLUSIONS     1 - Concentric hypertrophy.     2 - No wall motion abnormalities.     3 - Normal left ventricular systolic function (EF 55-60%).     4 - Normal left ventricular diastolic function.     5 - Normal right ventricular systolic function .     6 - The estimated PA systolic pressure is 30 mmHg.     7 - Mild to moderate aortic stenosis, DEEDEE = 1.29 cm2, AVAi = 0.97 cm2/m2, peak velocity = 2.7 m/s, mean gradient = 19 mmHg.             This document has been electronically    SIGNED BY: Zaire Briones MD On: 09/11/2018 17:26      Lab Results   Component Value Date    WBC 6.03 12/07/2020    HGB 9.4 (L) 12/07/2020    HCT 31.6 (L) 12/07/2020     12/07/2020    CHOL 190 04/29/2019    TRIG 39 04/29/2019    HDL 84 (H) 04/29/2019    ALT 13 12/14/2020    AST 28 12/14/2020     12/14/2020    K 5.5 (H)  12/14/2020    CL 98 12/14/2020    CREATININE 1.6 (H) 12/14/2020    BUN 49 (H) 12/14/2020    CO2 31 (H) 12/14/2020    TSH 1.431 07/01/2020    INR 1.0 10/15/2008    HGBA1C 5.5 03/17/2008       X-Ray Chest PA And Lateral  Narrative: EXAMINATION:  XR CHEST PA AND LATERAL    CLINICAL HISTORY:  Emphysema, unspecified    TECHNIQUE:  PA and lateral views of the chest were performed.    COMPARISON:  The exam from 11/24/2020    FINDINGS:  Emphysematous changes with bilateral pleuroparenchymal thickening and bilateral areas of scarring with unchanged blunting of the bilateral costophrenic angle secondary to effusions and/or pleural thickening.  No superimposed focal consolidation.  Osteopenia and thoracic spondylosis with old healed fractures of the bilateral humeri noted.  Impression: Stable chest as above.    Electronically signed by: Jose L Power MD  Date:    12/01/2020  Time:    11:36        Review of Systems   Constitutional: Positive for activity change, appetite change and fatigue. Negative for chills and fever.   HENT: Negative for congestion, ear pain and trouble swallowing.    Eyes: Negative for pain and visual disturbance.   Respiratory: Positive for shortness of breath. Negative for cough.    Cardiovascular: Negative for chest pain and leg swelling.   Gastrointestinal: Negative for abdominal pain, blood in stool, nausea and vomiting.   Endocrine: Negative for cold intolerance and heat intolerance.   Genitourinary: Negative for dysuria and frequency.   Musculoskeletal: Positive for arthralgias, back pain and gait problem. Negative for joint swelling, myalgias and neck pain.   Skin: Positive for color change and wound. Negative for rash.   Neurological: Positive for weakness. Negative for dizziness and headaches.   Psychiatric/Behavioral: Positive for dysphoric mood. Negative for behavioral problems and sleep disturbance. The patient is nervous/anxious.      Objective:     Vitals:    12/17/20 0930   BP: 120/60  "  Pulse: 84   Temp: 97.9 °F (36.6 °C)   Weight: 35.1 kg (77 lb 4.3 oz)   Height: 5' 3" (1.6 m)     Physical Exam  Constitutional:       General: She is not in acute distress.     Appearance: She is well-developed. She is cachectic. She is ill-appearing. She is not toxic-appearing.      Comments: Wearing nasal cannula   HENT:      Head: Normocephalic and atraumatic.      Right Ear: Tympanic membrane and external ear normal.      Left Ear: Tympanic membrane and external ear normal.      Nose: Nose normal.      Mouth/Throat:      Mouth: Mucous membranes are moist.      Pharynx: Oropharynx is clear. No oropharyngeal exudate.   Eyes:      General: No scleral icterus.        Right eye: No discharge.         Left eye: No discharge.      Conjunctiva/sclera: Conjunctivae normal.      Pupils: Pupils are equal, round, and reactive to light.   Neck:      Musculoskeletal: Normal range of motion and neck supple.      Thyroid: No thyromegaly.   Cardiovascular:      Rate and Rhythm: Normal rate and regular rhythm.      Comments: Distant heart sounds  Pulmonary:      Effort: No respiratory distress.      Breath sounds: Wheezing present.      Comments: Increased effort using nasal cannula  Chest:      Chest wall: No tenderness.   Abdominal:      General: Bowel sounds are normal. There is no distension.      Palpations: Abdomen is soft. There is no mass.      Tenderness: There is no abdominal tenderness. There is no guarding or rebound.   Musculoskeletal:         General: Tenderness present.      Comments: Right clavicle right shoulder limited range of motion   Lymphadenopathy:      Cervical: No cervical adenopathy.      Right cervical: No superficial cervical adenopathy.     Left cervical: No superficial cervical adenopathy.      Upper Body:      Right upper body: No supraclavicular or pectoral adenopathy.      Left upper body: No supraclavicular or pectoral adenopathy.   Skin:     General: Skin is warm and dry.      Capillary " Refill: Capillary refill takes 2 to 3 seconds.      Coloration: Skin is not pale.      Findings: No erythema or rash.   Neurological:      Mental Status: She is alert and oriented to person, place, and time. Mental status is at baseline.      Cranial Nerves: No cranial nerve deficit.      Sensory: No sensory deficit.   Psychiatric:         Behavior: Behavior normal. Behavior is cooperative.         Judgment: Judgment normal.       Assessment:     1. Frail elderly    2. Chronic diastolic heart failure    3. Pulmonary emphysema, unspecified emphysema type    4. Pulmonary hypertension    5. Aortic valve disorder    6. Coronary artery disease of native artery of native heart with stable angina pectoris    7. RLS (restless legs syndrome)    8. Chronic prescription opiate use    9. Chronic prescription benzodiazepine use    10. Chronic right shoulder pain    11. Recurrent falls while walking    12. Anxiety    13. Depression, unspecified depression type    14. Iron deficiency anemia secondary to inadequate dietary iron intake    15. Abrasion of left upper extremity, subsequent encounter    16. Hyperlipidemia, unspecified hyperlipidemia type    17. Acquired hypothyroidism    18. Peripheral vascular disease    19. Amputation of left great toe    20. Tortuous aorta    21. Age-related osteoporosis without current pathological fracture    22. Nonrheumatic aortic valve stenosis    23. HX of MI    24. History of breast cancer    25. Stage 4 chronic kidney disease      Plan:   Anju was seen today for establish care.    Diagnoses and all orders for this visit:    Frail elderly  Comments:  Multiple conditions establishing care very complex patient addressed concerns with benzo and opiate use working on reducing  Orders:  -     DRUGS OF ABUSE SCREEN, BLOOD; Future    Chronic diastolic heart failure  Comments:  Currently being managed by Dr. Trejo in cardiology continue to monitor watch for kidney function renally adjust  medications    Pulmonary emphysema, unspecified emphysema type  Comments:  On chronic O2 continue with nebulizer treatments    Pulmonary hypertension    Aortic valve disorder    Coronary artery disease of native artery of native heart with stable angina pectoris    RLS (restless legs syndrome)  Comments:  Reducing dose of citalopram increase iron check ferritin levels continue Klonopin at this time consideration for magnesium at night  Orders:  -     Ferritin; Future    Chronic prescription opiate use  Comments:  Encourage reduction of opiates in the setting of benzo use also reduced from number 75-45 follow-up in 3-4 weeks can use Tylenol reduce aspirin  Orders:  -     DRUGS OF ABUSE SCREEN, BLOOD; Future    Chronic prescription benzodiazepine use  Comments:  Also in the setting of chronic opiate use needing to work on reduction  Orders:  -     DRUGS OF ABUSE SCREEN, BLOOD; Future    Chronic right shoulder pain  Comments:  adjusting to decrease dose of the norco due to klonopin use as well. if kidney function improves can do cymbalta.  Okay to use Tylenol  Orders:  -     HYDROcodone-acetaminophen (NORCO) 5-325 mg per tablet; Take 1/2  tablet one time during the the day as needed and 1  tablet at night as needed    Recurrent falls while walking  Comments:  Reducing dosing of opiates and benzos steadily for the patient.  Currently attending physical therapy through home health    Anxiety  Comments:  Reducing citalopram from 40-20 okay to continue Klonopin consideration for Cymbalta if kidney function improves    Depression, unspecified depression type  Comments:  Reducing citalopram consideration for Cymbalta if improves if not then consider are not  Remeron to improve appetite and sleep    Iron deficiency anemia secondary to inadequate dietary iron intake  Comments:  Check ferritin levels continue with iron rich foods avoid aspirin for pain  Orders:  -     Ferritin; Future    Abrasion of left upper extremity,  subsequent encounter  Comments:  Improving see pictures    Hyperlipidemia, unspecified hyperlipidemia type  Comments:  Checking lipid panel with next set of lab work  Orders:  -     Lipid Panel; Future    Acquired hypothyroidism    Peripheral vascular disease    Amputation of left great toe    Tortuous aorta    Age-related osteoporosis without current pathological fracture    Nonrheumatic aortic valve stenosis    HX of MI    History of breast cancer    Stage 4 chronic kidney disease  Comments:  Repeating kidney function through Dr. Trejo needs to renally adjust any medications currently undergoing diuresis    Other orders  -     citalopram (CELEXA) 40 MG tablet; TAKE 1/2 TABLET BY MOUTH ONE TIME DAILY        Time spent: 40 minutes in face to face discussion concerning diagnosis, prognosis, review of lab and test results, benefits of treatment as well as management of disease, counseling of patient and coordination of care between various health care providers . Greater than half the time spent was used for coordination of care and counseling of patient.   Might    Follow up in about 3 weeks (around 1/7/2021) for f/u Telemed Dr Hamm/ RLS, med followup .    Patient Instructions   Take tylenol with the hydrocodone for pain.   1/2 tablet of citalopram now.   Increase iron in foods.   Can consider adding magnesium supplement if needed at night for RLS and sleep.

## 2020-12-17 NOTE — PATIENT INSTRUCTIONS
Take tylenol with the hydrocodone for pain.   1/2 tablet of citalopram now.   Increase iron in foods.   Can consider adding magnesium supplement if needed at night for RLS and sleep.

## 2020-12-18 ENCOUNTER — PES CALL (OUTPATIENT)
Dept: ADMINISTRATIVE | Facility: CLINIC | Age: 85
End: 2020-12-18

## 2020-12-22 ENCOUNTER — LAB VISIT (OUTPATIENT)
Dept: LAB | Facility: HOSPITAL | Age: 85
End: 2020-12-22
Attending: INTERNAL MEDICINE
Payer: MEDICARE

## 2020-12-22 ENCOUNTER — TELEPHONE (OUTPATIENT)
Dept: CARDIOLOGY | Facility: CLINIC | Age: 85
End: 2020-12-22

## 2020-12-22 DIAGNOSIS — I50.32 CHRONIC DIASTOLIC HEART FAILURE: Chronic | ICD-10-CM

## 2020-12-22 DIAGNOSIS — I50.32 CHRONIC DIASTOLIC HEART FAILURE: Primary | ICD-10-CM

## 2020-12-22 DIAGNOSIS — Z79.891 CHRONIC PRESCRIPTION OPIATE USE: ICD-10-CM

## 2020-12-22 DIAGNOSIS — Z79.899 CHRONIC PRESCRIPTION BENZODIAZEPINE USE: ICD-10-CM

## 2020-12-22 DIAGNOSIS — D50.8 IRON DEFICIENCY ANEMIA SECONDARY TO INADEQUATE DIETARY IRON INTAKE: ICD-10-CM

## 2020-12-22 DIAGNOSIS — R54 FRAIL ELDERLY: ICD-10-CM

## 2020-12-22 DIAGNOSIS — E78.5 HYPERLIPIDEMIA, UNSPECIFIED HYPERLIPIDEMIA TYPE: ICD-10-CM

## 2020-12-22 DIAGNOSIS — G25.81 RLS (RESTLESS LEGS SYNDROME): ICD-10-CM

## 2020-12-22 LAB
ALBUMIN SERPL BCP-MCNC: 3.5 G/DL (ref 3.5–5.2)
ALP SERPL-CCNC: 69 U/L (ref 55–135)
ALT SERPL W/O P-5'-P-CCNC: 14 U/L (ref 10–44)
ANION GAP SERPL CALC-SCNC: 8 MMOL/L (ref 8–16)
AST SERPL-CCNC: 29 U/L (ref 10–40)
BILIRUB SERPL-MCNC: 0.2 MG/DL (ref 0.1–1)
BUN SERPL-MCNC: 49 MG/DL (ref 10–30)
CALCIUM SERPL-MCNC: 9.3 MG/DL (ref 8.7–10.5)
CHLORIDE SERPL-SCNC: 94 MMOL/L (ref 95–110)
CHOLEST SERPL-MCNC: 178 MG/DL (ref 120–199)
CHOLEST/HDLC SERPL: 2 {RATIO} (ref 2–5)
CO2 SERPL-SCNC: 32 MMOL/L (ref 23–29)
CREAT SERPL-MCNC: 1.5 MG/DL (ref 0.5–1.4)
EST. GFR  (AFRICAN AMERICAN): 34.9 ML/MIN/1.73 M^2
EST. GFR  (NON AFRICAN AMERICAN): 30.2 ML/MIN/1.73 M^2
FERRITIN SERPL-MCNC: 60 NG/ML (ref 20–300)
GLUCOSE SERPL-MCNC: 42 MG/DL (ref 70–110)
HDLC SERPL-MCNC: 87 MG/DL (ref 40–75)
HDLC SERPL: 48.9 % (ref 20–50)
LDLC SERPL CALC-MCNC: 80.2 MG/DL (ref 63–159)
NONHDLC SERPL-MCNC: 91 MG/DL
POTASSIUM SERPL-SCNC: 4.3 MMOL/L (ref 3.5–5.1)
PROT SERPL-MCNC: 6.3 G/DL (ref 6–8.4)
SODIUM SERPL-SCNC: 134 MMOL/L (ref 136–145)
TRIGL SERPL-MCNC: 54 MG/DL (ref 30–150)

## 2020-12-22 PROCEDURE — 36415 COLL VENOUS BLD VENIPUNCTURE: CPT | Mod: HCNC

## 2020-12-22 PROCEDURE — 80307 DRUG TEST PRSMV CHEM ANLYZR: CPT | Mod: HCNC

## 2020-12-22 PROCEDURE — 82728 ASSAY OF FERRITIN: CPT | Mod: HCNC

## 2020-12-22 PROCEDURE — 80061 LIPID PANEL: CPT | Mod: HCNC

## 2020-12-22 PROCEDURE — 80053 COMPREHEN METABOLIC PANEL: CPT | Mod: HCNC

## 2020-12-23 ENCOUNTER — PATIENT MESSAGE (OUTPATIENT)
Dept: PRIMARY CARE CLINIC | Facility: CLINIC | Age: 85
End: 2020-12-23

## 2020-12-23 NOTE — TELEPHONE ENCOUNTER
Blood sugar was low with doing the lab work.  Need to make sure she is eating at minimum of every 2-3 hours.    Kidney function stable.  Cholesterol level stable.  Please inform.

## 2020-12-23 NOTE — TELEPHONE ENCOUNTER
Successfully informed pt daughter,    Potassium back to normal.   Continue lasix every other day.   Scheduled repeat CMP, BNP in 2 weeks.    Understanding was verbalized with no questions.

## 2020-12-23 NOTE — TELEPHONE ENCOUNTER
Please call pt.  Potassium back to normal.  Continue lasix every other day.  Schedule repeat CMP, BNP in 2 weeks.    Dr Trejo

## 2020-12-24 ENCOUNTER — TELEPHONE (OUTPATIENT)
Dept: PRIMARY CARE CLINIC | Facility: CLINIC | Age: 85
End: 2020-12-24

## 2020-12-24 NOTE — TELEPHONE ENCOUNTER
----- Message from Summer Lopez sent at 12/23/2020  4:54 PM CST -----  Contact: Violetta Mao  Pt's daughter Violetta was returning a missed call from the nurse. Please call back at [phone]. Thanks jh

## 2020-12-26 LAB
AMPHETAMINES SERPL QL: NEGATIVE
BARBITURATES SERPL QL SCN: NEGATIVE
BENZODIAZ SERPL QL SCN: NEGATIVE
BZE SERPL QL: NEGATIVE
CARBOXYTHC SERPL QL SCN: NEGATIVE
ETHANOL SERPL QL SCN: NEGATIVE
METHADONE SERPL QL SCN: NEGATIVE
OPIATES SERPL QL SCN: NEGATIVE
PCP SERPL QL SCN: NEGATIVE
PROPOXYPH SERPL QL: NEGATIVE

## 2020-12-28 ENCOUNTER — TELEPHONE (OUTPATIENT)
Dept: INTERNAL MEDICINE | Facility: CLINIC | Age: 85
End: 2020-12-28

## 2020-12-28 ENCOUNTER — HOSPITAL ENCOUNTER (OUTPATIENT)
Dept: CARDIOLOGY | Facility: HOSPITAL | Age: 85
Discharge: HOME | End: 2020-12-28
Attending: INTERNAL MEDICINE
Payer: MEDICARE

## 2020-12-28 ENCOUNTER — PATIENT MESSAGE (OUTPATIENT)
Dept: PRIMARY CARE CLINIC | Facility: CLINIC | Age: 85
End: 2020-12-28

## 2020-12-28 VITALS — WEIGHT: 77 LBS | BODY MASS INDEX: 13.64 KG/M2 | HEIGHT: 63 IN

## 2020-12-28 DIAGNOSIS — I27.20 PULMONARY HYPERTENSION: ICD-10-CM

## 2020-12-28 DIAGNOSIS — Z79.891 CHRONIC PRESCRIPTION OPIATE USE: Primary | ICD-10-CM

## 2020-12-28 DIAGNOSIS — I49.3 PVC'S (PREMATURE VENTRICULAR CONTRACTIONS): ICD-10-CM

## 2020-12-28 DIAGNOSIS — R94.31 ABNORMAL ECG: ICD-10-CM

## 2020-12-28 DIAGNOSIS — I34.0 NONRHEUMATIC MITRAL VALVE REGURGITATION: ICD-10-CM

## 2020-12-28 DIAGNOSIS — I50.32 CHRONIC DIASTOLIC HEART FAILURE: ICD-10-CM

## 2020-12-28 DIAGNOSIS — I25.2 OLD MI (MYOCARDIAL INFARCTION): ICD-10-CM

## 2020-12-28 DIAGNOSIS — I25.118 CORONARY ARTERY DISEASE OF NATIVE ARTERY OF NATIVE HEART WITH STABLE ANGINA PECTORIS: ICD-10-CM

## 2020-12-28 DIAGNOSIS — J43.9 PULMONARY EMPHYSEMA, UNSPECIFIED EMPHYSEMA TYPE: ICD-10-CM

## 2020-12-28 DIAGNOSIS — I35.9 AORTIC VALVE DISORDER: ICD-10-CM

## 2020-12-28 DIAGNOSIS — I10 ESSENTIAL HYPERTENSION: ICD-10-CM

## 2020-12-28 DIAGNOSIS — I25.2 OLD MI (MYOCARDIAL INFARCTION): Chronic | ICD-10-CM

## 2020-12-28 DIAGNOSIS — Z79.899 CHRONIC PRESCRIPTION BENZODIAZEPINE USE: ICD-10-CM

## 2020-12-28 DIAGNOSIS — R06.02 SHORTNESS OF BREATH: ICD-10-CM

## 2020-12-28 DIAGNOSIS — I50.9 ACUTE HEART FAILURE, UNSPECIFIED HEART FAILURE TYPE: ICD-10-CM

## 2020-12-28 DIAGNOSIS — I35.0 NONRHEUMATIC AORTIC VALVE STENOSIS: ICD-10-CM

## 2020-12-28 LAB
AV INDEX (PROSTH): 0.33
AV MEAN GRADIENT: 22 MMHG
AV PEAK GRADIENT: 32 MMHG
AV VALVE AREA: 1.03 CM2
AV VELOCITY RATIO: 0.32
BSA FOR ECHO PROCEDURE: 1.25 M2
CV ECHO LV RWT: 0.57 CM
DOP CALC AO PEAK VEL: 2.84 M/S
DOP CALC AO VTI: 61.69 CM
DOP CALC LVOT AREA: 3.1 CM2
DOP CALC LVOT DIAMETER: 1.99 CM
DOP CALC LVOT PEAK VEL: 0.9 M/S
DOP CALC LVOT STROKE VOLUME: 63.57 CM3
DOP CALCLVOT PEAK VEL VTI: 20.45 CM
E WAVE DECELERATION TIME: 262.63 MSEC
E/A RATIO: 0.75
E/E' RATIO: 15.38 M/S
ECHO LV POSTERIOR WALL: 1.08 CM (ref 0.6–1.1)
FRACTIONAL SHORTENING: 7 % (ref 28–44)
INTERVENTRICULAR SEPTUM: 1.2 CM (ref 0.6–1.1)
LA MAJOR: 3.92 CM
LA MINOR: 4.49 CM
LA WIDTH: 2.5 CM
LEFT ATRIUM SIZE: 3.61 CM
LEFT ATRIUM VOLUME INDEX: 24.9 ML/M2
LEFT ATRIUM VOLUME: 32.11 CM3
LEFT INTERNAL DIMENSION IN SYSTOLE: 3.51 CM (ref 2.1–4)
LEFT VENTRICLE DIASTOLIC VOLUME INDEX: 47.21 ML/M2
LEFT VENTRICLE DIASTOLIC VOLUME: 60.85 ML
LEFT VENTRICLE MASS INDEX: 109 G/M2
LEFT VENTRICLE SYSTOLIC VOLUME INDEX: 39.7 ML/M2
LEFT VENTRICLE SYSTOLIC VOLUME: 51.2 ML
LEFT VENTRICULAR INTERNAL DIMENSION IN DIASTOLE: 3.77 CM (ref 3.5–6)
LEFT VENTRICULAR MASS: 140.26 G
LV LATERAL E/E' RATIO: 12.5 M/S
LV SEPTAL E/E' RATIO: 20 M/S
MV PEAK A VEL: 1.34 M/S
MV PEAK E VEL: 1 M/S
PISA MRMAX VEL: 0.07 M/S
PISA TR MAX VEL: 2.94 M/S
RA MAJOR: 3.41 CM
RA WIDTH: 2.37 CM
RIGHT VENTRICULAR END-DIASTOLIC DIMENSION: 2.28 CM
SINUS: 2.3 CM
STJ: 2.2 CM
TDI LATERAL: 0.08 M/S
TDI SEPTAL: 0.05 M/S
TDI: 0.07 M/S
TR MAX PG: 35 MMHG

## 2020-12-28 PROCEDURE — 93306 TTE W/DOPPLER COMPLETE: CPT | Mod: 26,HCNC,, | Performed by: INTERNAL MEDICINE

## 2020-12-28 PROCEDURE — 93306 TTE W/DOPPLER COMPLETE: CPT | Mod: HCNC

## 2020-12-28 PROCEDURE — 93306 ECHO (CUPID ONLY): ICD-10-PCS | Mod: 26,HCNC,, | Performed by: INTERNAL MEDICINE

## 2020-12-28 NOTE — TELEPHONE ENCOUNTER
"Need to repeat the drug screen at next lab visit for patient to verify if she is actively taking the opiates and the benzos. This one is negative however it was reported that she was "out" of pain meds, but should have been positive for benzos.   "

## 2020-12-31 ENCOUNTER — TELEPHONE (OUTPATIENT)
Dept: CARDIOLOGY | Facility: CLINIC | Age: 85
End: 2020-12-31

## 2020-12-31 NOTE — TELEPHONE ENCOUNTER
----- Message from Rachel Huddleston sent at 12/31/2020 11:07 AM CST -----  Contact: Pt daughter/ Violetta Shipley called to get an order for the bloodwork for 01/06/2021 to be done at home by [facility] nurse, please call .[phone] (home)       Thanks    Rachel Huddleston

## 2021-01-04 ENCOUNTER — IMMUNIZATION (OUTPATIENT)
Dept: INTERNAL MEDICINE | Facility: CLINIC | Age: 86
End: 2021-01-04
Payer: MEDICARE

## 2021-01-04 ENCOUNTER — TELEPHONE (OUTPATIENT)
Dept: CARDIOLOGY | Facility: CLINIC | Age: 86
End: 2021-01-04

## 2021-01-04 DIAGNOSIS — Z23 NEED FOR VACCINATION: ICD-10-CM

## 2021-01-04 PROCEDURE — 91300 COVID-19, MRNA, LNP-S, PF, 30 MCG/0.3 ML DOSE VACCINE: CPT | Mod: PBBFAC | Performed by: FAMILY MEDICINE

## 2021-01-05 ENCOUNTER — DOCUMENT SCAN (OUTPATIENT)
Dept: HOME HEALTH SERVICES | Facility: HOSPITAL | Age: 86
End: 2021-01-05
Payer: MEDICARE

## 2021-01-05 ENCOUNTER — PATIENT MESSAGE (OUTPATIENT)
Dept: OTHER | Facility: OTHER | Age: 86
End: 2021-01-05

## 2021-01-07 ENCOUNTER — TELEPHONE (OUTPATIENT)
Dept: CARDIOLOGY | Facility: CLINIC | Age: 86
End: 2021-01-07

## 2021-01-08 ENCOUNTER — PATIENT MESSAGE (OUTPATIENT)
Dept: PRIMARY CARE CLINIC | Facility: CLINIC | Age: 86
End: 2021-01-08

## 2021-01-08 DIAGNOSIS — Z79.891 CHRONIC PRESCRIPTION OPIATE USE: Primary | ICD-10-CM

## 2021-01-12 ENCOUNTER — LAB VISIT (OUTPATIENT)
Dept: LAB | Facility: HOSPITAL | Age: 86
End: 2021-01-12
Attending: FAMILY MEDICINE
Payer: MEDICARE

## 2021-01-12 DIAGNOSIS — Z79.891 CHRONIC PRESCRIPTION OPIATE USE: ICD-10-CM

## 2021-01-12 PROCEDURE — 80307 DRUG TEST PRSMV CHEM ANLYZR: CPT | Mod: HCNC

## 2021-01-13 LAB
AMPHET+METHAMPHET UR QL: NEGATIVE
BARBITURATES UR QL SCN>200 NG/ML: NEGATIVE
BENZODIAZ UR QL SCN>200 NG/ML: NEGATIVE
BZE UR QL SCN: NEGATIVE
CANNABINOIDS UR QL SCN: NEGATIVE
CREAT UR-MCNC: 27 MG/DL (ref 15–325)
ETHANOL UR-MCNC: <10 MG/DL
METHADONE UR QL SCN>300 NG/ML: NEGATIVE
OPIATES UR QL SCN: NORMAL
PCP UR QL SCN>25 NG/ML: NEGATIVE
TOXICOLOGY INFORMATION: NORMAL

## 2021-01-14 ENCOUNTER — OFFICE VISIT (OUTPATIENT)
Dept: PRIMARY CARE CLINIC | Facility: CLINIC | Age: 86
End: 2021-01-14
Payer: MEDICARE

## 2021-01-14 DIAGNOSIS — E03.9 ACQUIRED HYPOTHYROIDISM: ICD-10-CM

## 2021-01-14 DIAGNOSIS — N18.4 STAGE 4 CHRONIC KIDNEY DISEASE: ICD-10-CM

## 2021-01-14 DIAGNOSIS — R54 FRAIL ELDERLY: ICD-10-CM

## 2021-01-14 DIAGNOSIS — R29.6 RECURRENT FALLS WHILE WALKING: ICD-10-CM

## 2021-01-14 DIAGNOSIS — Z79.891 CHRONIC PRESCRIPTION OPIATE USE: ICD-10-CM

## 2021-01-14 DIAGNOSIS — I27.20 PULMONARY HYPERTENSION: ICD-10-CM

## 2021-01-14 DIAGNOSIS — Z51.5 ENCOUNTER FOR PALLIATIVE CARE IN HOME HOSPICE: Primary | ICD-10-CM

## 2021-01-14 DIAGNOSIS — E78.2 MIXED HYPERLIPIDEMIA: ICD-10-CM

## 2021-01-14 DIAGNOSIS — I34.0 NONRHEUMATIC MITRAL VALVE REGURGITATION: ICD-10-CM

## 2021-01-14 DIAGNOSIS — I25.118 CORONARY ARTERY DISEASE OF NATIVE ARTERY OF NATIVE HEART WITH STABLE ANGINA PECTORIS: ICD-10-CM

## 2021-01-14 DIAGNOSIS — Z89.412 ACQUIRED ABSENCE OF LEFT GREAT TOE: ICD-10-CM

## 2021-01-14 DIAGNOSIS — M81.0 AGE-RELATED OSTEOPOROSIS WITHOUT CURRENT PATHOLOGICAL FRACTURE: ICD-10-CM

## 2021-01-14 DIAGNOSIS — J43.9 PULMONARY EMPHYSEMA, UNSPECIFIED EMPHYSEMA TYPE: ICD-10-CM

## 2021-01-14 DIAGNOSIS — I35.9 AORTIC VALVE DISORDER: ICD-10-CM

## 2021-01-14 DIAGNOSIS — R64 CACHEXIA: ICD-10-CM

## 2021-01-14 DIAGNOSIS — Z85.3 HISTORY OF BREAST CANCER: ICD-10-CM

## 2021-01-14 DIAGNOSIS — I50.32 CHRONIC DIASTOLIC HEART FAILURE: ICD-10-CM

## 2021-01-14 DIAGNOSIS — I73.9 PERIPHERAL VASCULAR DISEASE: ICD-10-CM

## 2021-01-14 DIAGNOSIS — I77.1 TORTUOUS AORTA: ICD-10-CM

## 2021-01-14 DIAGNOSIS — F32.5 MAJOR DEPRESSIVE DISORDER WITH SINGLE EPISODE, IN REMISSION: ICD-10-CM

## 2021-01-14 DIAGNOSIS — Z79.899 CHRONIC PRESCRIPTION BENZODIAZEPINE USE: ICD-10-CM

## 2021-01-14 PROCEDURE — 99499 RISK ADDL DX/OHS AUDIT: ICD-10-PCS | Mod: 95,,, | Performed by: FAMILY MEDICINE

## 2021-01-14 PROCEDURE — 99215 PR OFFICE/OUTPT VISIT, EST, LEVL V, 40-54 MIN: ICD-10-PCS | Mod: HCNC,95,, | Performed by: FAMILY MEDICINE

## 2021-01-14 PROCEDURE — 1159F PR MEDICATION LIST DOCUMENTED IN MEDICAL RECORD: ICD-10-PCS | Mod: HCNC,95,, | Performed by: FAMILY MEDICINE

## 2021-01-14 PROCEDURE — 99215 OFFICE O/P EST HI 40 MIN: CPT | Mod: HCNC,95,, | Performed by: FAMILY MEDICINE

## 2021-01-14 PROCEDURE — 99499 UNLISTED E&M SERVICE: CPT | Mod: 95,,, | Performed by: FAMILY MEDICINE

## 2021-01-14 PROCEDURE — 1159F MED LIST DOCD IN RCRD: CPT | Mod: HCNC,95,, | Performed by: FAMILY MEDICINE

## 2021-01-18 ENCOUNTER — PATIENT MESSAGE (OUTPATIENT)
Dept: PRIMARY CARE CLINIC | Facility: CLINIC | Age: 86
End: 2021-01-18

## 2021-01-18 DIAGNOSIS — M25.511 CHRONIC RIGHT SHOULDER PAIN: ICD-10-CM

## 2021-01-18 DIAGNOSIS — G89.29 CHRONIC RIGHT SHOULDER PAIN: ICD-10-CM

## 2021-01-19 RX ORDER — HYDROCODONE BITARTRATE AND ACETAMINOPHEN 5; 325 MG/1; MG/1
TABLET ORAL
Qty: 45 TABLET | Refills: 0 | Status: SHIPPED | OUTPATIENT
Start: 2021-01-19

## 2021-01-25 ENCOUNTER — PATIENT MESSAGE (OUTPATIENT)
Dept: CARDIOLOGY | Facility: CLINIC | Age: 86
End: 2021-01-25

## 2021-01-25 ENCOUNTER — PATIENT OUTREACH (OUTPATIENT)
Dept: ADMINISTRATIVE | Facility: OTHER | Age: 86
End: 2021-01-25

## 2021-01-25 ENCOUNTER — IMMUNIZATION (OUTPATIENT)
Dept: INTERNAL MEDICINE | Facility: CLINIC | Age: 86
End: 2021-01-25
Payer: MEDICARE

## 2021-01-25 ENCOUNTER — PATIENT MESSAGE (OUTPATIENT)
Dept: SLEEP MEDICINE | Facility: CLINIC | Age: 86
End: 2021-01-25

## 2021-01-25 DIAGNOSIS — Z23 NEED FOR VACCINATION: Primary | ICD-10-CM

## 2021-01-25 PROCEDURE — 91300 COVID-19, MRNA, LNP-S, PF, 30 MCG/0.3 ML DOSE VACCINE: CPT | Mod: PBBFAC | Performed by: FAMILY MEDICINE

## 2021-01-25 PROCEDURE — 0002A COVID-19, MRNA, LNP-S, PF, 30 MCG/0.3 ML DOSE VACCINE: CPT | Mod: PBBFAC | Performed by: FAMILY MEDICINE

## 2021-01-31 PROCEDURE — 99457 PR MONITORING, PHYSIOL PARAM, REMOTE, 1ST 20 MINS, PER MONTH: ICD-10-PCS | Mod: S$GLB,,, | Performed by: FAMILY MEDICINE

## 2021-01-31 PROCEDURE — 99457 RPM TX MGMT 1ST 20 MIN: CPT | Mod: S$GLB,,, | Performed by: FAMILY MEDICINE

## 2021-02-09 DIAGNOSIS — D53.9 MACROCYTIC ANEMIA: Primary | ICD-10-CM

## 2021-03-12 ENCOUNTER — PATIENT OUTREACH (OUTPATIENT)
Dept: ADMINISTRATIVE | Facility: OTHER | Age: 86
End: 2021-03-12

## 2021-03-15 ENCOUNTER — OFFICE VISIT (OUTPATIENT)
Dept: OPHTHALMOLOGY | Facility: CLINIC | Age: 86
End: 2021-03-15
Payer: MEDICARE

## 2021-03-15 DIAGNOSIS — H04.129 DRY EYE: ICD-10-CM

## 2021-03-15 DIAGNOSIS — H40.1133 PRIMARY OPEN ANGLE GLAUCOMA OF BOTH EYES, SEVERE STAGE: Primary | ICD-10-CM

## 2021-03-15 DIAGNOSIS — Z96.1 PSEUDOPHAKIA OF BOTH EYES: ICD-10-CM

## 2021-03-15 DIAGNOSIS — H52.7 REFRACTIVE ERROR: ICD-10-CM

## 2021-03-15 PROCEDURE — 92083 EXTENDED VISUAL FIELD XM: CPT | Mod: GW,S$GLB,, | Performed by: OPHTHALMOLOGY

## 2021-03-15 PROCEDURE — 92015 DETERMINE REFRACTIVE STATE: CPT | Mod: GW,S$GLB,, | Performed by: OPHTHALMOLOGY

## 2021-03-15 PROCEDURE — 92133 POSTERIOR SEGMENT OCT OPTIC NERVE(OCULAR COHERENCE TOMOGRAPHY) - OU - BOTH EYES: ICD-10-PCS | Mod: GW,S$GLB,, | Performed by: OPHTHALMOLOGY

## 2021-03-15 PROCEDURE — 92083 HUMPHREY VISUAL FIELD - OU - BOTH EYES: ICD-10-PCS | Mod: GW,S$GLB,, | Performed by: OPHTHALMOLOGY

## 2021-03-15 PROCEDURE — 92015 PR REFRACTION: ICD-10-PCS | Mod: GW,S$GLB,, | Performed by: OPHTHALMOLOGY

## 2021-03-15 PROCEDURE — 1159F MED LIST DOCD IN RCRD: CPT | Mod: GW,S$GLB,, | Performed by: OPHTHALMOLOGY

## 2021-03-15 PROCEDURE — 99999 PR PBB SHADOW E&M-EST. PATIENT-LVL III: CPT | Mod: PBBFAC,,, | Performed by: OPHTHALMOLOGY

## 2021-03-15 PROCEDURE — 99214 OFFICE O/P EST MOD 30 MIN: CPT | Mod: GW,S$GLB,, | Performed by: OPHTHALMOLOGY

## 2021-03-15 PROCEDURE — 1159F PR MEDICATION LIST DOCUMENTED IN MEDICAL RECORD: ICD-10-PCS | Mod: GW,S$GLB,, | Performed by: OPHTHALMOLOGY

## 2021-03-15 PROCEDURE — 99999 PR PBB SHADOW E&M-EST. PATIENT-LVL III: ICD-10-PCS | Mod: PBBFAC,,, | Performed by: OPHTHALMOLOGY

## 2021-03-15 PROCEDURE — 99214 PR OFFICE/OUTPT VISIT, EST, LEVL IV, 30-39 MIN: ICD-10-PCS | Mod: GW,S$GLB,, | Performed by: OPHTHALMOLOGY

## 2021-03-15 PROCEDURE — 92133 CPTRZD OPH DX IMG PST SGM ON: CPT | Mod: GW,S$GLB,, | Performed by: OPHTHALMOLOGY

## 2021-04-26 ENCOUNTER — PES CALL (OUTPATIENT)
Dept: ADMINISTRATIVE | Facility: CLINIC | Age: 86
End: 2021-04-26

## 2021-05-04 ENCOUNTER — PATIENT MESSAGE (OUTPATIENT)
Dept: OPHTHALMOLOGY | Facility: CLINIC | Age: 86
End: 2021-05-04

## 2022-04-14 DIAGNOSIS — I10 HYPERTENSION: ICD-10-CM

## 2022-04-27 ENCOUNTER — PATIENT MESSAGE (OUTPATIENT)
Dept: ADMINISTRATIVE | Facility: HOSPITAL | Age: 87
End: 2022-04-27
Payer: MEDICARE

## 2022-06-23 ENCOUNTER — PATIENT OUTREACH (OUTPATIENT)
Dept: ADMINISTRATIVE | Facility: HOSPITAL | Age: 87
End: 2022-06-23
Payer: MEDICARE

## 2022-07-27 ENCOUNTER — PATIENT MESSAGE (OUTPATIENT)
Dept: ADMINISTRATIVE | Facility: HOSPITAL | Age: 87
End: 2022-07-27
Payer: MEDICARE

## 2022-10-20 ENCOUNTER — PATIENT MESSAGE (OUTPATIENT)
Dept: ADMINISTRATIVE | Facility: HOSPITAL | Age: 87
End: 2022-10-20
Payer: MEDICARE

## 2022-11-16 ENCOUNTER — PES CALL (OUTPATIENT)
Dept: ADMINISTRATIVE | Facility: CLINIC | Age: 87
End: 2022-11-16
Payer: MEDICARE

## 2023-10-31 NOTE — TELEPHONE ENCOUNTER
Shannon Roy MA  [facility] Staff 4 minutes ago (1:50 PM)     Please F/U with this pt. She needs a CBC redrawn and a OV ASAP- see lab and note         Protopic Counseling: Patient may experience a mild burning sensation during topical application. Protopic is not approved in children less than 2 years of age. There have been case reports of hematologic and skin malignancies in patients using topical calcineurin inhibitors although causality is questionable.

## 2024-07-09 NOTE — TELEPHONE ENCOUNTER
----- Message from Vielka Grant sent at 8/14/2017 12:06 PM CDT -----  Contact: Patients daughter, Violetta Shipley states that there was some paperwork is being faxed but appears fax machine is not picking up, please call Ms Shipley back at [phone]. Thank you   show